# Patient Record
Sex: FEMALE | HISPANIC OR LATINO | Employment: UNEMPLOYED | URBAN - METROPOLITAN AREA
[De-identification: names, ages, dates, MRNs, and addresses within clinical notes are randomized per-mention and may not be internally consistent; named-entity substitution may affect disease eponyms.]

---

## 2017-02-23 ENCOUNTER — ALLSCRIPTS OFFICE VISIT (OUTPATIENT)
Dept: OTHER | Facility: OTHER | Age: 52
End: 2017-02-23

## 2017-02-23 DIAGNOSIS — Z13.29 ENCOUNTER FOR SCREENING FOR OTHER SUSPECTED ENDOCRINE DISORDER: ICD-10-CM

## 2017-02-23 DIAGNOSIS — R53.83 OTHER FATIGUE: ICD-10-CM

## 2017-02-23 DIAGNOSIS — R10.2 PELVIC AND PERINEAL PAIN: ICD-10-CM

## 2017-03-01 ENCOUNTER — GENERIC CONVERSION - ENCOUNTER (OUTPATIENT)
Dept: OTHER | Facility: OTHER | Age: 52
End: 2017-03-01

## 2017-03-29 ENCOUNTER — GENERIC CONVERSION - ENCOUNTER (OUTPATIENT)
Dept: OTHER | Facility: OTHER | Age: 52
End: 2017-03-29

## 2017-04-06 ENCOUNTER — GENERIC CONVERSION - ENCOUNTER (OUTPATIENT)
Dept: OTHER | Facility: OTHER | Age: 52
End: 2017-04-06

## 2017-10-03 ENCOUNTER — ALLSCRIPTS OFFICE VISIT (OUTPATIENT)
Dept: OTHER | Facility: OTHER | Age: 52
End: 2017-10-03

## 2017-10-03 ENCOUNTER — TRANSCRIBE ORDERS (OUTPATIENT)
Dept: LAB | Facility: CLINIC | Age: 52
End: 2017-10-03

## 2017-10-03 ENCOUNTER — APPOINTMENT (OUTPATIENT)
Dept: LAB | Facility: CLINIC | Age: 52
End: 2017-10-03
Payer: COMMERCIAL

## 2017-10-03 DIAGNOSIS — R10.9 ABDOMINAL PAIN: ICD-10-CM

## 2017-10-03 DIAGNOSIS — R10.11 RIGHT UPPER QUADRANT PAIN: ICD-10-CM

## 2017-10-03 LAB
BASOPHILS # BLD AUTO: 0.04 THOUSANDS/ΜL (ref 0–0.1)
BASOPHILS NFR BLD AUTO: 1 % (ref 0–1)
EOSINOPHIL # BLD AUTO: 0.08 THOUSAND/ΜL (ref 0–0.61)
EOSINOPHIL NFR BLD AUTO: 1 % (ref 0–6)
ERYTHROCYTE [DISTWIDTH] IN BLOOD BY AUTOMATED COUNT: 13 % (ref 11.6–15.1)
HCT VFR BLD AUTO: 38 % (ref 34.8–46.1)
HGB BLD-MCNC: 13.2 G/DL (ref 11.5–15.4)
LYMPHOCYTES # BLD AUTO: 1.91 THOUSANDS/ΜL (ref 0.6–4.47)
LYMPHOCYTES NFR BLD AUTO: 34 % (ref 14–44)
MCH RBC QN AUTO: 31.4 PG (ref 26.8–34.3)
MCHC RBC AUTO-ENTMCNC: 34.7 G/DL (ref 31.4–37.4)
MCV RBC AUTO: 90 FL (ref 82–98)
MONOCYTES # BLD AUTO: 0.46 THOUSAND/ΜL (ref 0.17–1.22)
MONOCYTES NFR BLD AUTO: 8 % (ref 4–12)
NEUTROPHILS # BLD AUTO: 3.13 THOUSANDS/ΜL (ref 1.85–7.62)
NEUTS SEG NFR BLD AUTO: 56 % (ref 43–75)
NRBC BLD AUTO-RTO: 0 /100 WBCS
PLATELET # BLD AUTO: 302 THOUSANDS/UL (ref 149–390)
PMV BLD AUTO: 10.5 FL (ref 8.9–12.7)
RBC # BLD AUTO: 4.21 MILLION/UL (ref 3.81–5.12)
WBC # BLD AUTO: 5.63 THOUSAND/UL (ref 4.31–10.16)

## 2017-10-03 PROCEDURE — 80053 COMPREHEN METABOLIC PANEL: CPT

## 2017-10-03 PROCEDURE — 83690 ASSAY OF LIPASE: CPT

## 2017-10-03 PROCEDURE — 81003 URINALYSIS AUTO W/O SCOPE: CPT

## 2017-10-03 PROCEDURE — 85025 COMPLETE CBC W/AUTO DIFF WBC: CPT

## 2017-10-03 PROCEDURE — 82150 ASSAY OF AMYLASE: CPT

## 2017-10-03 PROCEDURE — 36415 COLL VENOUS BLD VENIPUNCTURE: CPT

## 2017-10-04 ENCOUNTER — GENERIC CONVERSION - ENCOUNTER (OUTPATIENT)
Dept: OTHER | Facility: OTHER | Age: 52
End: 2017-10-04

## 2017-10-04 LAB
ALBUMIN SERPL BCP-MCNC: 3.5 G/DL (ref 3.5–5)
ALP SERPL-CCNC: 99 U/L (ref 46–116)
ALT SERPL W P-5'-P-CCNC: 32 U/L (ref 12–78)
AMYLASE SERPL-CCNC: 82 IU/L (ref 25–115)
ANION GAP SERPL CALCULATED.3IONS-SCNC: 8 MMOL/L (ref 4–13)
AST SERPL W P-5'-P-CCNC: 25 U/L (ref 5–45)
BILIRUB SERPL-MCNC: 0.31 MG/DL (ref 0.2–1)
BILIRUB UR QL STRIP: NEGATIVE
BUN SERPL-MCNC: 11 MG/DL (ref 5–25)
CALCIUM SERPL-MCNC: 8.7 MG/DL (ref 8.3–10.1)
CHLORIDE SERPL-SCNC: 105 MMOL/L (ref 100–108)
CLARITY UR: CLEAR
CO2 SERPL-SCNC: 24 MMOL/L (ref 21–32)
COLOR UR: YELLOW
CREAT SERPL-MCNC: 0.72 MG/DL (ref 0.6–1.3)
GFR SERPL CREATININE-BSD FRML MDRD: 97 ML/MIN/1.73SQ M
GLUCOSE SERPL-MCNC: 83 MG/DL (ref 65–140)
GLUCOSE UR STRIP-MCNC: NEGATIVE MG/DL
HGB UR QL STRIP.AUTO: NEGATIVE
KETONES UR STRIP-MCNC: NEGATIVE MG/DL
LEUKOCYTE ESTERASE UR QL STRIP: NEGATIVE
LIPASE SERPL-CCNC: 184 U/L (ref 73–393)
NITRITE UR QL STRIP: NEGATIVE
PH UR STRIP.AUTO: 6 [PH] (ref 4.5–8)
POTASSIUM SERPL-SCNC: 3.9 MMOL/L (ref 3.5–5.3)
PROT SERPL-MCNC: 7.8 G/DL (ref 6.4–8.2)
PROT UR STRIP-MCNC: NEGATIVE MG/DL
SODIUM SERPL-SCNC: 137 MMOL/L (ref 136–145)
SP GR UR STRIP.AUTO: 1.01 (ref 1–1.03)
UROBILINOGEN UR QL STRIP.AUTO: 1 E.U./DL

## 2017-10-06 ENCOUNTER — HOSPITAL ENCOUNTER (OUTPATIENT)
Dept: ULTRASOUND IMAGING | Facility: CLINIC | Age: 52
Discharge: HOME | End: 2017-10-06
Payer: COMMERCIAL

## 2017-10-06 DIAGNOSIS — R10.11 RIGHT UPPER QUADRANT PAIN: ICD-10-CM

## 2017-10-06 PROCEDURE — 76700 US EXAM ABDOM COMPLETE: CPT

## 2017-10-10 NOTE — PROGRESS NOTES
Assessment  1  Abdominal pain, RUQ (789 01) (R10 11)    Plan  Abdominal pain, Abdominal pain, RUQ    · (1) URINALYSIS w URINE C/S REFLEX (will reflex a microscopy if leukocytes, occult  blood, or nitrites are not within normal limits); Status:Active; Requested EMD:[id number]; Abdominal pain, RUQ    · (1) AMYLASE; Status:Active; Requested HCX:[id number];    · (1) CBC/PLT/DIFF; Status:Active; Requested MYP:[id number];    · (1) COMPREHENSIVE METABOLIC PANEL; Status:Active; Requested AOC:[id number];    · (1) LIPASE; Status:Active; Requested WCD:[id number];    · * US ABDOMEN COMPLETE; Status:Hold For - Scheduling; Requested [facility]:[id number];     Discussion/Summary    ABd pain-RUQ/flank-- BW and complete Abd US  Will f/u with pt  if anything is abnormal  Could be MSK, or related to lumber spinal stenosis  She may need to f/u with Dr Anders Chun  Chief Complaint  abd pain      History of Present Illness  HPI: Pt c/o abd pain for several weeks, about 3  Located in the right posterior/lateral rib cage and radiates around to the RUQ  It comes and goes for the last 3 weeks   but has been more constant for the last 3 days  Can be 3-5/10 or as bad as 7-8/10  It helps to massage it and sometimes it's worse with eating  No particular food makes it worse  Not worse with activity or movement or body position change  rash, no f/c/s  No change in BM, no blood in stool or melena  No N/V   UTI sx  She has a hx of spinal stenosis in the lumbar area and sees Dr Anders Chun  She was started on Gabapentin recently  She took celebrex in the past but not any more  has a history of cholecystectomy      Review of Systems    Constitutional: No fever, no chills, feels well, no tiredness, no recent weight gain or loss  ENT: no ear ache, no loss of hearing, no nosebleeds or nasal discharge, no sore throat or hoarseness     Cardiovascular: no complaints of slow or fast heart rate, no chest pain, no palpitations, no leg claudication or lower extremity edema  Respiratory: no complaints of shortness of breath, no wheezing, no dyspnea on exertion, no orthopnea or PND  Gastrointestinal: abdominal pain, but-as noted in HPI,-no nausea,-no vomiting,-no constipation,-no diarrhea-and-no blood in stools  Genitourinary: no complaints of dysuria, no incontinence, no pelvic pain, no dysmenorrhea, no vaginal discharge or abnormal vaginal bleeding  Musculoskeletal: as noted in HPI  ROS reviewed  Active Problems  1  Abdominal pain (789 00) (R10 9)   2  Abdominal pain, RUQ (789 01) (R10 11)   3  Abdominal pain, suprapubic (789 09) (R10 2)   4  Abnormal blood chemistry (790 6) (R79 9)   5  Abnormal CT scan, gastrointestinal tract (793 4) (R93 3)   6  Abnormal vaginal bleeding (623 8) (N93 9)   7  Acute maxillary sinusitis, recurrence not specified (461 0) (J01 00)   8  Allergic rhinitis due to pollen (477 0) (J30 1)   9  Change in bowel habits (787 99) (R19 4)   10  Chest wall tenderness (786 52) (R07 89)   11  Extrinsic asthma (493 00) (J45 909)   12  Fatigue (780 79) (R53 83)   13  Gastroesophageal reflux disease (530 81) (K21 9)   14  Irritable bowel syndrome (564 1) (K58 9)   15  Parotid mass (784 2) (K11 9)   16  Posterior cervical lymphadenopathy (785 6) (R59 0)   17  Rash, skin (782 1) (R21)   18  Right lower quadrant pain (789 03) (R10 31)   19  Screening for thyroid disorder (V77 0) (Z13 29)   20  Sialoadenitis (527 2) (K11 20)   21  Thumb pain, left (729 5) (M79 645)   22  Tick bites (919 4,E906 4) [facility])    Past Medical History  Active Problems And Past Medical History Reviewed: The active problems and past medical history were reviewed and updated today  Surgical History  Surgical History Reviewed: The surgical history was reviewed and updated today  Social History   · Housewife or homemaker   ·    · Never smoker   · No alcohol use   · No drug use  The social history was reviewed and updated today        Family History  Family History Reviewed: The family history was reviewed and updated today  Current Meds   1  Acidophilus Oral Tablet; Therapy: 36UIS8586 to Recorded   2  Gabapentin 600 MG Oral Tablet; Therapy: (Recorded:03Oct2017) to Recorded   3  Linzess 145 MCG Oral Capsule; 1 qd; Therapy: 17ENX3879 to (Last Rx:51Dca5877) Ordered   4  Nasonex 50 MCG/ACT Nasal Suspension (Mometasone Furoate); USE 2 SPRAYS IN   EACH NOSTRIL ONCE DAILY; Therapy: 47PTZ7334 to (Last [place])  Requested for: 15Rpq5433 Ordered   5  ProAir  (90 Base) MCG/ACT Inhalation Aerosol Solution; INHALE 2 PUFFS   EVERY 4 HOURS AS NEEDED; Therapy: 02ILO4572 to Recorded   6  Tylenol 325 MG Oral Tablet; TAKE 1 TABLET Every morning PRN; Therapy: 60OGL6201 to Recorded    The medication list was reviewed and updated today  Allergies  1  No Known Drug Allergies    Vitals   Recorded: 36LWQ6692 04:12PM   Temperature 98 2 F, Oral   Heart Rate 70   Systolic 568   Diastolic 80   Height 5 ft    Weight 159 lb    BMI Calculated 31 05   BSA Calculated 1 69   O2 Saturation 98     Physical Exam    Constitutional   General appearance: No acute distress, well appearing and well nourished  Eyes   Conjunctiva and lids: No swelling, erythema or discharge  Pupils and irises: Equal, round and reactive to light  Ears, Nose, Mouth, and Throat   External inspection of ears and nose: Normal     Otoscopic examination: Tympanic membranes translucent with normal light reflex  Canals patent without erythema  Nasal mucosa, septum, and turbinates: Normal without edema or erythema  Oropharynx: Normal with no erythema, edema, exudate or lesions  Pulmonary   Respiratory effort: No increased work of breathing or signs of respiratory distress  Auscultation of lungs: Clear to auscultation  Cardiovascular   Auscultation of heart: Normal rate and rhythm, normal S1 and S2, without murmurs      Examination of extremities for edema and/or varicosities: Normal     Abdomen   Abdomen: Abnormal   The abdomen was rounded  Bowel sounds were normal  There was mild tenderness in the right upper quadrant  The abdomen was not firm  No rebound tenderness  No guarding  no CVA tenderness   Lymphatic   Palpation of lymph nodes in neck: No lymphadenopathy  Musculoskeletal   Inspection/palpation of joints, bones, and muscles: Abnormal  -slightly tender to palp in the right posterolateral rib cage area     Psychiatric   Mood and affect: Normal          Signatures   Electronically signed by : AILYN Mora; Oct  3 2017  5:11PM EST                       (Author)    Electronically signed by : CHELSEY Krueger ; Oct  9 2017 10:33AM EST

## 2018-01-13 VITALS
HEIGHT: 60 IN | SYSTOLIC BLOOD PRESSURE: 118 MMHG | TEMPERATURE: 98.2 F | OXYGEN SATURATION: 98 % | DIASTOLIC BLOOD PRESSURE: 80 MMHG | HEART RATE: 70 BPM | BODY MASS INDEX: 31.22 KG/M2 | WEIGHT: 159 LBS

## 2018-01-15 NOTE — RESULT NOTES
Verified Results  (1) COMPREHENSIVE METABOLIC PANEL [id number] 12:59CO [place] Julia Christianson   TW Order Number: [id number]     Test Name Result Flag Reference   GLUCOSE,RANDM 83 mg/dL     If the patient is fasting, the ADA then defines impaired fasting glucose as > 100 mg/dL and diabetes as > or equal to 123 mg/dL  Specimen collection should occur prior to Sulfasalazine administration due to the potential for falsely depressed results  Specimen collection should occur prior to Sulfapyridine administration due to the potential for falsely elevated results  SODIUM 137 mmol/L  136-145   POTASSIUM 3 9 mmol/L  3 5-5 3   CHLORIDE 105 mmol/L  100-108   CARBON DIOXIDE 24 mmol/L  21-32   ANION GAP (CALC) 8 mmol/L  4-13   BLOOD UREA NITROGEN 11 mg/dL  5-25   CREATININE 0 72 mg/dL  0 60-1 30   Standardized to IDMS reference method   CALCIUM 8 7 mg/dL  8 3-10 1   BILI, TOTAL 0 31 mg/dL  0 20-1 00   ALK PHOSPHATAS 99 U/L     ALT (SGPT) 32 U/L  12-78   Specimen collection should occur prior to Sulfasalazine and/or Sulfapyridine administration due to the potential for falsely depressed results  AST(SGOT) 25 U/L  5-45   Specimen collection should occur prior to Sulfasalazine administration due to the potential for falsely depressed results  ALBUMIN 3 5 g/dL  3 5-5 0   TOTAL PROTEIN 7 8 g/dL  6 4-8 2   eGFR 97 ml/min/1 73sq m     National Kidney Disease Education Program recommendations are as follows:  GFR calculation is accurate only with a steady state creatinine  Chronic Kidney disease less than 60 ml/min/1 73 sq  meters  Kidney failure less than 15 ml/min/1 73 sq  meters  (1) CBC/PLT/DIFF 62WOV0639 04:50PM Leona Neri   TW Order Number: [id number]     Test Name Result Flag Reference   WBC COUNT 5 63 Thousand/uL  4 31-10 16   RBC COUNT 4 21 Million/uL  3 81-5 12   HEMOGLOBIN 13 2 g/dL  11 5-15 4   HEMATOCRIT 38 0 %  34 8-46  1   MCV 90 fL  82-98   MCH 31 4 pg  26 8-34 3   MCHC 34 7 g/dL  31 4-37 4   RDW 13 0 %  11 6-15 1   MPV 10 5 fL  8 9-12 7   PLATELET COUNT 707 Thousands/uL  149-390   nRBC AUTOMATED 0 /100 WBCs     NEUTROPHILS RELATIVE PERCENT 56 %  43-75   LYMPHOCYTES RELATIVE PERCENT 34 %  14-44   MONOCYTES RELATIVE PERCENT 8 %  4-12   EOSINOPHILS RELATIVE PERCENT 1 %  0-6   BASOPHILS RELATIVE PERCENT 1 %  0-1   NEUTROPHILS ABSOLUTE COUNT 3 13 Thousands/? ??L  1 85-7 62   LYMPHOCYTES ABSOLUTE COUNT 1 91 Thousands/? ??L  0 60-4 47   MONOCYTES ABSOLUTE COUNT 0 46 Thousand/? ??L  0 17-1 22   EOSINOPHILS ABSOLUTE COUNT 0 08 Thousand/? ??L  0 00-0 61   BASOPHILS ABSOLUTE COUNT 0 04 Thousands/? ??L  0 00-0 10     (1) LIPASE 89YBP5044 04:50PM Mariam Hotelements    Order Number: [id number]     Test Name Result Flag Reference   LIPASE 184 u/L       (1) AMYLASE 57WRC7730 04:50PM M-DAQ    Order Number: [id number]     Test Name Result Flag Reference   AMYLASE 82 IU/L       (1) URINALYSIS w URINE C/S REFLEX (will reflex a microscopy if leukocytes, occult blood, or nitrites are not within normal limits) 03Oct2017 04:50PM M-DAQ    Order Number: [id number]     Test Name Result Flag Reference   COLOR Yellow     CLARITY Clear     PH UA 6 0  4 5-8 0   LEUKOCYTE ESTERASE UA Negative  Negative   NITRITE UA Negative  Negative   PROTEIN UA Negative mg/dl  Negative   GLUCOSE UA Negative mg/dl  Negative   KETONES UA Negative mg/dl  Negative   UROBILINOGEN UA 1 0 E U /dl  0 2, 1 0 E U /dl   BILIRUBIN UA Negative  Negative   BLOOD UA Negative  Negative   SPECIFIC GRAVITY UA 1 007  1 003-1 030     Health Maintenance Flow Sheet 86IRA5814 03:18PM      Test Name Result Flag Reference   Pap      faxed to [facility] obgyn dr Ysabel Alicea   [facility]  Health Maintenance    · COLONOSCOPY ; every 10 years; Last [id number];  Next [id number]; Status:Active

## 2018-01-22 VITALS
DIASTOLIC BLOOD PRESSURE: 86 MMHG | HEIGHT: 60 IN | OXYGEN SATURATION: 97 % | HEART RATE: 88 BPM | BODY MASS INDEX: 31.12 KG/M2 | TEMPERATURE: 98.1 F | WEIGHT: 158.5 LBS | SYSTOLIC BLOOD PRESSURE: 122 MMHG

## 2018-01-24 ENCOUNTER — OFFICE VISIT (OUTPATIENT)
Dept: INTERNAL MEDICINE CLINIC | Facility: CLINIC | Age: 53
End: 2018-01-24
Payer: COMMERCIAL

## 2018-01-24 VITALS
DIASTOLIC BLOOD PRESSURE: 88 MMHG | SYSTOLIC BLOOD PRESSURE: 126 MMHG | WEIGHT: 159.6 LBS | HEART RATE: 81 BPM | OXYGEN SATURATION: 99 % | BODY MASS INDEX: 30.13 KG/M2 | HEIGHT: 61 IN | TEMPERATURE: 99.2 F

## 2018-01-24 DIAGNOSIS — J02.9 ACUTE PHARYNGITIS, UNSPECIFIED ETIOLOGY: Primary | ICD-10-CM

## 2018-01-24 DIAGNOSIS — Z12.11 SCREENING FOR COLON CANCER: ICD-10-CM

## 2018-01-24 LAB — S PYO AG THROAT QL: NEGATIVE

## 2018-01-24 PROCEDURE — 87880 STREP A ASSAY W/OPTIC: CPT | Performed by: PHYSICIAN ASSISTANT

## 2018-01-24 PROCEDURE — 99213 OFFICE O/P EST LOW 20 MIN: CPT | Performed by: PHYSICIAN ASSISTANT

## 2018-01-24 RX ORDER — MOMETASONE FUROATE 50 UG/1
2 SPRAY, METERED NASAL DAILY
COMMUNITY
Start: 2014-05-13 | End: 2018-03-26

## 2018-01-24 RX ORDER — YOHIMBE BARK 500 MG
CAPSULE ORAL
COMMUNITY
Start: 2014-05-13 | End: 2019-03-20 | Stop reason: CLARIF

## 2018-01-24 RX ORDER — ACETAMINOPHEN 325 MG/1
TABLET ORAL
COMMUNITY
Start: 2014-05-13 | End: 2019-03-20 | Stop reason: CLARIF

## 2018-01-24 RX ORDER — ALBUTEROL SULFATE 90 UG/1
2 AEROSOL, METERED RESPIRATORY (INHALATION) EVERY 4 HOURS PRN
COMMUNITY
Start: 2014-05-13 | End: 2019-03-20 | Stop reason: CLARIF

## 2018-01-24 RX ORDER — GABAPENTIN 600 MG/1
600 TABLET ORAL
COMMUNITY
Start: 2017-12-29 | End: 2018-05-01 | Stop reason: SDUPTHER

## 2018-01-24 NOTE — PROGRESS NOTES
Assessment/Plan:  Strep screen negative most likely viral illness recommend symptomatic treatment with Tylenol Sudafed return if she worsens    No problem-specific Assessment & Plan notes found for this encounter  Diagnoses and all orders for this visit:    Acute pharyngitis, unspecified etiology    Other orders  -     gabapentin (NEURONTIN) 600 MG tablet;   -     albuterol (PROAIR HFA) 90 mcg/act inhaler; Inhale 2 puffs every 4 (four) hours as needed  -     mometasone (NASONEX) 50 mcg/act nasal spray; 2 sprays into each nostril daily  -     Linaclotide (LINZESS) 145 MCG CAPS; Take by mouth daily  -     acetaminophen (TYLENOL) 325 mg tablet; Take by mouth  -     Lactobacillus (ACIDOPHILUS) 100 MG CAPS; Take by mouth          Subjective:      Patient ID: Jenny Jean is a 46 y o  female  She developed feverishness generally achy a sore throat carotidynia on the left 4 days ago she is beginning to have a nonproductive cough and some pain in her left ear no shortness of breath or chest pain  She has a little nauseated no vomiting or diarrhea no severe muscular aching  Her health has previously good        The following portions of the patient's history were reviewed and updated as appropriate: allergies, current medications, past family history, past medical history, past social history, past surgical history and problem list     Review of Systems   Constitutional: Negative for activity change, appetite change, chills, diaphoresis, fatigue, fever and unexpected weight change  HENT: Positive for ear pain, mouth sores and sore throat  Negative for congestion, dental problem, drooling, ear discharge, facial swelling, hearing loss, nosebleeds, postnasal drip, rhinorrhea, sinus pain, sinus pressure, sneezing, tinnitus, trouble swallowing and voice change  Eyes: Negative for photophobia, pain, discharge, redness, itching and visual disturbance  Respiratory: Positive for cough   Negative for apnea, choking, chest tightness, shortness of breath, wheezing and stridor  Cardiovascular: Negative for chest pain, palpitations and leg swelling  Gastrointestinal: Negative for abdominal distention, abdominal pain, anal bleeding, blood in stool, constipation, diarrhea, nausea, rectal pain and vomiting  Endocrine: Negative for cold intolerance, heat intolerance, polydipsia, polyphagia and polyuria  Genitourinary: Negative for decreased urine volume, difficulty urinating, dysuria, enuresis, flank pain, frequency, genital sores, hematuria and urgency  Musculoskeletal: Negative for arthralgias, back pain, gait problem, joint swelling, myalgias, neck pain and neck stiffness  Skin: Negative for color change, pallor, rash and wound  Neurological: Negative for dizziness, tremors, seizures, syncope, facial asymmetry, speech difficulty, weakness, light-headedness, numbness and headaches  Hematological: Negative for adenopathy  Does not bruise/bleed easily  Psychiatric/Behavioral: Negative for agitation, behavioral problems, confusion, decreased concentration, dysphoric mood, hallucinations, self-injury, sleep disturbance and suicidal ideas  The patient is not nervous/anxious and is not hyperactive  Objective:     Physical Exam   Constitutional: She is oriented to person, place, and time  She appears well-developed  HENT:   Head: Normocephalic  Right Ear: External ear normal    Left Ear: External ear normal    Mouth/Throat: Oropharynx is clear and moist    Eyes: Conjunctivae are normal  Pupils are equal, round, and reactive to light  Neck: Neck supple  Cardiovascular: Normal rate, regular rhythm, normal heart sounds and intact distal pulses  Pulmonary/Chest: Effort normal and breath sounds normal    Abdominal: Soft  Bowel sounds are normal    Musculoskeletal: Normal range of motion  Neurological: She is alert and oriented to person, place, and time  She has normal reflexes     Skin: Skin is warm and dry

## 2018-03-26 ENCOUNTER — OFFICE VISIT (OUTPATIENT)
Dept: INTERNAL MEDICINE CLINIC | Facility: CLINIC | Age: 53
End: 2018-03-26
Payer: COMMERCIAL

## 2018-03-26 VITALS
DIASTOLIC BLOOD PRESSURE: 80 MMHG | BODY MASS INDEX: 29.45 KG/M2 | WEIGHT: 156 LBS | TEMPERATURE: 98.3 F | HEIGHT: 61 IN | HEART RATE: 75 BPM | OXYGEN SATURATION: 98 % | SYSTOLIC BLOOD PRESSURE: 130 MMHG

## 2018-03-26 DIAGNOSIS — Z13.228 SCREENING FOR METABOLIC DISORDER: ICD-10-CM

## 2018-03-26 DIAGNOSIS — Z13.29 SCREENING FOR THYROID DISORDER: ICD-10-CM

## 2018-03-26 DIAGNOSIS — M48.07 SPINAL STENOSIS OF LUMBOSACRAL REGION: ICD-10-CM

## 2018-03-26 DIAGNOSIS — R09.82 POST-NASAL DRIP: ICD-10-CM

## 2018-03-26 DIAGNOSIS — M79.89 BILATERAL HAND SWELLING: ICD-10-CM

## 2018-03-26 DIAGNOSIS — Z13.6 SCREENING FOR HEART DISEASE: ICD-10-CM

## 2018-03-26 DIAGNOSIS — Z13.0 SCREENING FOR DEFICIENCY ANEMIA: ICD-10-CM

## 2018-03-26 DIAGNOSIS — J30.9 ALLERGIC RHINITIS, UNSPECIFIED CHRONICITY, UNSPECIFIED SEASONALITY, UNSPECIFIED TRIGGER: Primary | ICD-10-CM

## 2018-03-26 PROCEDURE — 99214 OFFICE O/P EST MOD 30 MIN: CPT | Performed by: PHYSICIAN ASSISTANT

## 2018-03-26 RX ORDER — CELECOXIB 100 MG/1
100 CAPSULE ORAL DAILY
Refills: 0
Start: 2018-03-26 | End: 2019-03-20 | Stop reason: CLARIF

## 2018-03-26 RX ORDER — FLUTICASONE PROPIONATE 50 MCG
2 SPRAY, SUSPENSION (ML) NASAL DAILY
Qty: 3 BOTTLE | Refills: 3 | Status: SHIPPED | OUTPATIENT
Start: 2018-03-26 | End: 2018-10-24 | Stop reason: CLARIF

## 2018-03-26 NOTE — PROGRESS NOTES
Assessment/Plan:     symptoms consistent with allergic rhinitis versus viral syndrome -the patient has been taking Nasonex chronically  We suggested she switch to [facility] and add an over-the-counter nonsedating antihistamine  She says her  uses Cresencio Skyler and she has at home  She will try these medications for at least 10-14 days to determine efficacy  Chest wall pain -this is likely musculoskeletal   The patient takes Celebrex daily for spinal stenosis prescribed by her pain management doctor  She may to add Tylenol 3 to 4 times a day for the next 5-7 days and warm compresses to the area  Hand swelling and stiffness-- check MAY, rheumatoid factor and Lyme titer  Patient has not had routine screening blood work in over a year  We will give her orders and she will follow up with Dr hernandez        No problem-specific Mariah Lipscomb notes found for this encounter  Diagnoses and all orders for this visit:    Allergic rhinitis, unspecified chronicity, unspecified seasonality, unspecified trigger    Spinal stenosis of lumbosacral region  -     celecoxib (CeleBREX) 100 mg capsule; Take 1 capsule (100 mg total) by mouth daily    Post-nasal drip  -     fluticasone (FLONASE) 50 mcg/act nasal spray; 2 sprays into each nostril daily    Bilateral hand swelling  -     MAY Screen w/ Reflex to Titer/Pattern; Future  -     RF Screen w/ Reflex to Titer; Future  -     Lyme Antibody Profile with reflex to WB; Future    Screening for deficiency anemia  -     CBC and differential; Future    Screening for metabolic disorder  -     Comprehensive metabolic panel; Future    Screening for heart disease  -     Lipid panel; Future    Screening for thyroid disorder  -     TSH, 3rd generation with T4 reflex; Future    Other orders  -     Hm Colonoscopy  -     Mammo screening bilateral w cad  -     Mammo screening bilateral w cad  -     Hm Pap Smear          Subjective:      Patient ID: Rey Purvis is a 46 y o  female  Patient comes in with symptoms of pain that started around her left ear and extended from under her ear down her neck  She also has had a clear runny nose, a lot of sneezing and watery eyes  She also notices a lot of throat clearing but no cough, fever, chills or sweats  No shortness of breath  She does complain of some pain over her left chest area that hurts more when she presses on it and takes a deep breath  She felt this more last week and it seems to be getting a little bit better  She denies any activity or trauma that may have caused this pain  Patient also reports that every day when she wakes up her hands feel swollen and stiff  As the day goes on it seems to get better but it is a daily thing  This is been going on for a few months  Earache    There is pain in the left ear  This is a new problem  The current episode started in the past 7 days  The problem occurs every few minutes  The problem has been waxing and waning  There has been no fever  Associated symptoms include neck pain and rhinorrhea  Pertinent negatives include no abdominal pain, coughing, diarrhea, ear discharge or sore throat  The following portions of the patient's history were reviewed and updated as appropriate: allergies, current medications, past family history, past medical history, past social history, past surgical history and problem list     Review of Systems   Constitutional: Negative for chills, fatigue and fever  HENT: Positive for congestion, ear pain, postnasal drip, rhinorrhea and sneezing  Negative for ear discharge, sinus pain, sinus pressure and sore throat  Eyes:        Watery eyes   Respiratory: Negative for cough, chest tightness and shortness of breath  Cardiovascular: Negative for chest pain and palpitations  Gastrointestinal: Negative for abdominal pain, diarrhea and nausea  Musculoskeletal: Positive for neck pain          Chest wall tenderness over the left lateral ribcage and just lateral to the sternum on the left         Objective:      /80   Pulse 75   Temp 98 3 °F (36 8 °C) (Tympanic)   Ht 5' 1" (1 549 m)   Wt 70 8 kg (156 lb)   SpO2 98%   BMI 29 48 kg/m²          Physical Exam   Constitutional: She appears well-developed and well-nourished  HENT:   Head: Normocephalic and atraumatic  Right Ear: External ear normal    Left Ear: External ear normal    Mouth/Throat: Oropharynx is clear and moist  No oropharyngeal exudate  Eyes: Conjunctivae are normal  Pupils are equal, round, and reactive to light  Right eye exhibits no discharge  Left eye exhibits no discharge  Neck: Normal range of motion  Neck supple  Cardiovascular: Normal rate, regular rhythm, normal heart sounds and intact distal pulses  Pulmonary/Chest: Effort normal and breath sounds normal  No respiratory distress  She has no wheezes  She has no rales  Abdominal: Soft  Bowel sounds are normal  There is no tenderness  Musculoskeletal: She exhibits tenderness  Tenderness to palpation over the chest wall just lateral to the left breast and lateral to the sternum on the left   Lymphadenopathy:     She has no cervical adenopathy  Skin: No rash noted

## 2018-03-28 ENCOUNTER — TELEPHONE (OUTPATIENT)
Dept: INTERNAL MEDICINE CLINIC | Facility: CLINIC | Age: 53
End: 2018-03-28

## 2018-03-28 NOTE — TELEPHONE ENCOUNTER
Patient was in on Monday and saw Jenelle Braden  Would like to know if Roxanne can call in an antibiotic  She has developed phlegm and a hacking cough and throat is hurting  Pls call her at [phone]   If she needs to come in, pls make appt for after 4PM

## 2018-04-02 ENCOUNTER — OFFICE VISIT (OUTPATIENT)
Dept: INTERNAL MEDICINE CLINIC | Facility: CLINIC | Age: 53
End: 2018-04-02
Payer: COMMERCIAL

## 2018-04-02 ENCOUNTER — TELEPHONE (OUTPATIENT)
Dept: INTERNAL MEDICINE CLINIC | Facility: CLINIC | Age: 53
End: 2018-04-02

## 2018-04-02 VITALS
SYSTOLIC BLOOD PRESSURE: 138 MMHG | BODY MASS INDEX: 29.19 KG/M2 | DIASTOLIC BLOOD PRESSURE: 90 MMHG | HEART RATE: 71 BPM | HEIGHT: 61 IN | OXYGEN SATURATION: 97 % | TEMPERATURE: 98.8 F | RESPIRATION RATE: 18 BRPM | WEIGHT: 154.6 LBS

## 2018-04-02 DIAGNOSIS — J45.21 EXACERBATION OF INTERMITTENT ASTHMA, UNSPECIFIED ASTHMA SEVERITY: Primary | ICD-10-CM

## 2018-04-02 DIAGNOSIS — J40 BRONCHITIS: ICD-10-CM

## 2018-04-02 PROCEDURE — 3008F BODY MASS INDEX DOCD: CPT | Performed by: PHYSICIAN ASSISTANT

## 2018-04-02 PROCEDURE — 99214 OFFICE O/P EST MOD 30 MIN: CPT | Performed by: PHYSICIAN ASSISTANT

## 2018-04-02 RX ORDER — PREDNISONE 10 MG/1
TABLET ORAL
Qty: 30 TABLET | Refills: 0 | Status: SHIPPED | OUTPATIENT
Start: 2018-04-02 | End: 2018-05-01 | Stop reason: ALTCHOICE

## 2018-04-02 RX ORDER — LEVOFLOXACIN 750 MG/1
750 TABLET ORAL EVERY 24 HOURS
Qty: 10 TABLET | Refills: 0 | Status: SHIPPED | OUTPATIENT
Start: 2018-04-02 | End: 2018-04-12

## 2018-04-02 NOTE — TELEPHONE ENCOUNTER
ADILENE BRAGG LAST  Monday    SAID  SHE  IS  STILL NOT  FEELING  BETTER  HAS  A LOT  OF  PHLEGM WANTS  TO  KNOW  IF  SHE  CAN GET  AN  ANTIBODIC  DOWN   PT  WAS  IN  BED  ALL WEEKEND   [facility]  [place]   CALL PT  [phone]

## 2018-04-02 NOTE — PROGRESS NOTES
Assessment/Plan:  A lot of coughing rhonchi and wheezing antibiotics steroids cough medicine anti-inflammatories return if she worsens  Diagnoses and all orders for this visit:    Exacerbation of intermittent asthma, unspecified asthma severity  -     predniSONE 10 mg tablet; Take 40 milligrams for 2 days, 30 milligrams for 2 days, 20 milligrams for 2 days, 10 milligrams for 2 days, then stop  -     levofloxacin (LEVAQUIN) 750 mg tablet; Take 1 tablet (750 mg total) by mouth every 24 hours for 10 days    Bronchitis  -     predniSONE 10 mg tablet; Take 40 milligrams for 2 days, 30 milligrams for 2 days, 20 milligrams for 2 days, 10 milligrams for 2 days, then stop  -     levofloxacin (LEVAQUIN) 750 mg tablet; Take 1 tablet (750 mg total) by mouth every 24 hours for 10 days        Subjective:      Patient ID: Sheila Olson is a 46 y o  female  Coughing wheezing chest pain fever fatigue poor appetite she has had all of these symptoms for 10 days now she is coughing up light green phlegm no hemoptysis  She has generalized chest pain when she takes a deep breath she has severe paroxysms of coughing throughout the day  A previous history of intermittent asthma and allergies  Seen here last week treated symptomatically but symptoms have become worse no orthopnea or PND slightly short of breath when she climbs stairs history of chronic back pain follows with pain management        The following portions of the patient's history were reviewed and updated as appropriate:   She has a past medical history of Abdominal pain; Arthralgia of pelvic region and thigh; Chest pain; CTS (carpal tunnel syndrome); Fibromyalgia; Hepatitis; Leukocytopenia; Leukorrhea; and Temporomandibular joint sounds on opening and/or closing the jaw ,   does not have any pertinent problems on file  ,   has a past surgical history that includes Cholecystectomy; Foot surgery (Right); Sinus surgery; and Incontinence surgery  ,  family history includes Alzheimer's disease in her father and maternal grandmother; Asthma in her father; Emphysema in her father and paternal grandmother; Heart attack in her father; Lymphoma in her maternal aunt  ,   reports that she has never smoked  She has never used smokeless tobacco  She reports that she does not drink alcohol or use drugs  ,  is allergic to other     Current Outpatient Prescriptions   Medication Sig Dispense Refill    acetaminophen (TYLENOL) 325 mg tablet Take by mouth      albuterol (PROAIR HFA) 90 mcg/act inhaler Inhale 2 puffs every 4 (four) hours as needed      celecoxib (CeleBREX) 100 mg capsule Take 1 capsule (100 mg total) by mouth daily  0    fluticasone (FLONASE) 50 mcg/act nasal spray 2 sprays into each nostril daily 3 Bottle 3    gabapentin (NEURONTIN) 600 MG tablet Take 600 mg by mouth daily at bedtime       Lactobacillus (ACIDOPHILUS) 100 MG CAPS Take by mouth      Linaclotide (LINZESS) 145 MCG CAPS Take by mouth daily      levofloxacin (LEVAQUIN) 750 mg tablet Take 1 tablet (750 mg total) by mouth every 24 hours for 10 days 10 tablet 0    predniSONE 10 mg tablet Take 40 milligrams for 2 days, 30 milligrams for 2 days, 20 milligrams for 2 days, 10 milligrams for 2 days, then stop 30 tablet 0     No current facility-administered medications for this visit  Review of Systems   Constitutional: Positive for appetite change, fatigue and fever  Negative for activity change, chills, diaphoresis and unexpected weight change  HENT: Negative for congestion, dental problem, drooling, ear discharge, ear pain, facial swelling, hearing loss, nosebleeds, postnasal drip, rhinorrhea, sinus pain, sinus pressure, sneezing, sore throat, tinnitus, trouble swallowing and voice change  Eyes: Negative for photophobia, pain, discharge, redness, itching and visual disturbance  Respiratory: Positive for cough, shortness of breath and wheezing  Negative for apnea, choking, chest tightness and stridor  Cardiovascular: Negative for chest pain, palpitations and leg swelling  Gastrointestinal: Negative for abdominal distention, abdominal pain, anal bleeding, blood in stool, constipation, diarrhea, nausea, rectal pain and vomiting  Endocrine: Negative for cold intolerance, heat intolerance, polydipsia, polyphagia and polyuria  Genitourinary: Negative for decreased urine volume, difficulty urinating, dysuria, enuresis, flank pain, frequency, genital sores, hematuria and urgency  Musculoskeletal: Negative for arthralgias, back pain, gait problem, joint swelling, myalgias, neck pain and neck stiffness  Skin: Negative for color change, pallor, rash and wound  Neurological: Negative for dizziness, tremors, seizures, syncope, facial asymmetry, speech difficulty, weakness, light-headedness, numbness and headaches  Hematological: Negative for adenopathy  Does not bruise/bleed easily  Psychiatric/Behavioral: Negative for agitation, behavioral problems, confusion, decreased concentration, dysphoric mood, hallucinations, self-injury, sleep disturbance and suicidal ideas  The patient is not nervous/anxious and is not hyperactive  Objective:  Vitals:    04/02/18 1747 04/02/18 1826   BP: 140/96 138/90   Pulse: 71    Resp: 18    Temp: 98 8 °F (37 1 °C)    SpO2: 97%    Weight: 70 1 kg (154 lb 9 6 oz)    Height: 5' 1" (1 549 m)      Body mass index is 29 21 kg/m²  Physical Exam   Constitutional: She is oriented to person, place, and time  She appears well-developed  HENT:   Head: Normocephalic  Right Ear: External ear normal    Left Ear: External ear normal    Mouth/Throat: No oropharyngeal exudate (Pharynx injected no adenopathy)  Eyes: Conjunctivae are normal  Pupils are equal, round, and reactive to light  Neck: Neck supple  No thyromegaly present  Cardiovascular: Normal rate, regular rhythm, normal heart sounds and intact distal pulses  Exam reveals no gallop and no friction rub      No murmur heard  Pulmonary/Chest: Effort normal  Respiratory distress: A lotOf coughing  She has wheezes (And rhonchi)  She has no rales  She exhibits no tenderness  Abdominal: Soft  Bowel sounds are normal    Musculoskeletal: Normal range of motion  Neurological: She is alert and oriented to person, place, and time  She has normal reflexes  Skin: Skin is warm and dry

## 2018-04-25 ENCOUNTER — APPOINTMENT (OUTPATIENT)
Dept: LAB | Facility: HOSPITAL | Age: 53
End: 2018-04-25
Payer: COMMERCIAL

## 2018-04-25 DIAGNOSIS — M79.89 BILATERAL HAND SWELLING: ICD-10-CM

## 2018-04-25 DIAGNOSIS — Z13.6 SCREENING FOR HEART DISEASE: ICD-10-CM

## 2018-04-25 DIAGNOSIS — Z13.29 SCREENING FOR THYROID DISORDER: ICD-10-CM

## 2018-04-25 DIAGNOSIS — Z13.0 SCREENING FOR DEFICIENCY ANEMIA: ICD-10-CM

## 2018-04-25 DIAGNOSIS — Z13.228 SCREENING FOR METABOLIC DISORDER: ICD-10-CM

## 2018-04-25 LAB
ALBUMIN SERPL BCP-MCNC: 3.5 G/DL (ref 3.5–5)
ALP SERPL-CCNC: 80 U/L (ref 46–116)
ALT SERPL W P-5'-P-CCNC: 55 U/L (ref 12–78)
ANION GAP SERPL CALCULATED.3IONS-SCNC: 8 MMOL/L (ref 4–13)
AST SERPL W P-5'-P-CCNC: 36 U/L (ref 5–45)
BASOPHILS # BLD AUTO: 0.02 THOUSANDS/ΜL (ref 0–0.1)
BASOPHILS NFR BLD AUTO: 1 % (ref 0–1)
BILIRUB SERPL-MCNC: 0.4 MG/DL (ref 0.2–1)
BUN SERPL-MCNC: 12 MG/DL (ref 5–25)
CALCIUM SERPL-MCNC: 9.2 MG/DL (ref 8.3–10.1)
CHLORIDE SERPL-SCNC: 104 MMOL/L (ref 100–108)
CHOLEST SERPL-MCNC: 238 MG/DL (ref 50–200)
CO2 SERPL-SCNC: 28 MMOL/L (ref 21–32)
CREAT SERPL-MCNC: 0.74 MG/DL (ref 0.6–1.3)
EOSINOPHIL # BLD AUTO: 0.06 THOUSAND/ΜL (ref 0–0.61)
EOSINOPHIL NFR BLD AUTO: 2 % (ref 0–6)
ERYTHROCYTE [DISTWIDTH] IN BLOOD BY AUTOMATED COUNT: 12.3 % (ref 11.6–15.1)
GFR SERPL CREATININE-BSD FRML MDRD: 93 ML/MIN/1.73SQ M
GLUCOSE P FAST SERPL-MCNC: 102 MG/DL (ref 65–99)
HCT VFR BLD AUTO: 38.5 % (ref 34.8–46.1)
HDLC SERPL-MCNC: 62 MG/DL (ref 40–60)
HGB BLD-MCNC: 12.8 G/DL (ref 11.5–15.4)
LDLC SERPL CALC-MCNC: 156 MG/DL (ref 0–100)
LYMPHOCYTES # BLD AUTO: 1.43 THOUSANDS/ΜL (ref 0.6–4.47)
LYMPHOCYTES NFR BLD AUTO: 36 % (ref 14–44)
MCH RBC QN AUTO: 30.3 PG (ref 26.8–34.3)
MCHC RBC AUTO-ENTMCNC: 33.2 G/DL (ref 31.4–37.4)
MCV RBC AUTO: 91 FL (ref 82–98)
MONOCYTES # BLD AUTO: 0.25 THOUSAND/ΜL (ref 0.17–1.22)
MONOCYTES NFR BLD AUTO: 6 % (ref 4–12)
NEUTROPHILS # BLD AUTO: 2.18 THOUSANDS/ΜL (ref 1.85–7.62)
NEUTS SEG NFR BLD AUTO: 55 % (ref 43–75)
NONHDLC SERPL-MCNC: 176 MG/DL
NRBC BLD AUTO-RTO: 0 /100 WBCS
PLATELET # BLD AUTO: 290 THOUSANDS/UL (ref 149–390)
PMV BLD AUTO: 9.8 FL (ref 8.9–12.7)
POTASSIUM SERPL-SCNC: 3.8 MMOL/L (ref 3.5–5.3)
PROT SERPL-MCNC: 7.6 G/DL (ref 6.4–8.2)
RBC # BLD AUTO: 4.23 MILLION/UL (ref 3.81–5.12)
SODIUM SERPL-SCNC: 140 MMOL/L (ref 136–145)
TRIGL SERPL-MCNC: 101 MG/DL
TSH SERPL DL<=0.05 MIU/L-ACNC: 2.58 UIU/ML (ref 0.36–3.74)
WBC # BLD AUTO: 3.95 THOUSAND/UL (ref 4.31–10.16)

## 2018-04-25 PROCEDURE — 86430 RHEUMATOID FACTOR TEST QUAL: CPT

## 2018-04-25 PROCEDURE — 36415 COLL VENOUS BLD VENIPUNCTURE: CPT

## 2018-04-25 PROCEDURE — 80053 COMPREHEN METABOLIC PANEL: CPT

## 2018-04-25 PROCEDURE — 80061 LIPID PANEL: CPT

## 2018-04-25 PROCEDURE — 84443 ASSAY THYROID STIM HORMONE: CPT

## 2018-04-25 PROCEDURE — 86038 ANTINUCLEAR ANTIBODIES: CPT

## 2018-04-25 PROCEDURE — 85025 COMPLETE CBC W/AUTO DIFF WBC: CPT

## 2018-04-25 PROCEDURE — 86618 LYME DISEASE ANTIBODY: CPT

## 2018-04-26 LAB
B BURGDOR IGG SER IA-ACNC: 0.15
B BURGDOR IGM SER IA-ACNC: 0.23
RHEUMATOID FACT SER QL LA: NEGATIVE

## 2018-04-27 LAB — RYE IGE QN: NEGATIVE

## 2018-05-01 ENCOUNTER — OFFICE VISIT (OUTPATIENT)
Dept: INTERNAL MEDICINE CLINIC | Facility: CLINIC | Age: 53
End: 2018-05-01
Payer: COMMERCIAL

## 2018-05-01 VITALS
BODY MASS INDEX: 29.91 KG/M2 | HEART RATE: 85 BPM | HEIGHT: 61 IN | DIASTOLIC BLOOD PRESSURE: 72 MMHG | OXYGEN SATURATION: 97 % | WEIGHT: 158.4 LBS | SYSTOLIC BLOOD PRESSURE: 130 MMHG

## 2018-05-01 DIAGNOSIS — E78.00 HYPERCHOLESTEROLEMIA: ICD-10-CM

## 2018-05-01 DIAGNOSIS — J45.20 MILD INTERMITTENT EXTRINSIC ASTHMA WITHOUT COMPLICATION: ICD-10-CM

## 2018-05-01 DIAGNOSIS — R73.01 ELEVATED FASTING BLOOD SUGAR: ICD-10-CM

## 2018-05-01 DIAGNOSIS — R03.0 ELEVATED BLOOD-PRESSURE READING WITHOUT DIAGNOSIS OF HYPERTENSION: ICD-10-CM

## 2018-05-01 DIAGNOSIS — M48.07 SPINAL STENOSIS OF LUMBOSACRAL REGION: ICD-10-CM

## 2018-05-01 DIAGNOSIS — K58.2 IRRITABLE BOWEL SYNDROME WITH BOTH CONSTIPATION AND DIARRHEA: Primary | ICD-10-CM

## 2018-05-01 PROBLEM — M54.50 LOW BACK PAIN: Status: ACTIVE | Noted: 2017-04-12

## 2018-05-01 PROBLEM — M25.559 HIP PAIN: Status: ACTIVE | Noted: 2018-05-01

## 2018-05-01 PROCEDURE — 99214 OFFICE O/P EST MOD 30 MIN: CPT | Performed by: INTERNAL MEDICINE

## 2018-05-01 RX ORDER — GABAPENTIN 600 MG/1
1 TABLET ORAL EVERY 12 HOURS
COMMUNITY
Start: 2017-06-05 | End: 2021-01-12 | Stop reason: SDUPTHER

## 2018-05-01 NOTE — ASSESSMENT & PLAN NOTE
It is unclear whether she has htn  She is in pain from her back problems  She will have her bp taken in the am and in the pm and note if pain is present when bp is high  She does have a family hx of htn  Will consider starting meds based on results of two weeks worth of numbers

## 2018-05-01 NOTE — PROGRESS NOTES
Assessment/Plan:    Extrinsic asthma  She rarely uses her albuterol  Continue with prn albuterol     Elevated fasting blood sugar  Check a1c and ratio at follow up    Hypercholesterolemia  ldl elevated  She would like to diet  Will do low fat low chol     Elevated blood-pressure reading without diagnosis of hypertension  It is unclear whether she has htn  She is in pain from her back problems  She will have her bp taken in the am and in the pm and note if pain is present when bp is high  She does have a family hx of htn  Will consider starting meds based on results of two weeks worth of numbers  Spinal stenosis of lumbosacral region  Follow with Dr Sena Peraza and discuss whether she is a candidate for surgery       Diagnoses and all orders for this visit:    Irritable bowel syndrome with both constipation and diarrhea    Mild intermittent extrinsic asthma without complication    Elevated fasting blood sugar    Spinal stenosis of lumbosacral region    Hypercholesterolemia    Elevated blood-pressure reading without diagnosis of hypertension    Other orders  -     gabapentin (NEURONTIN) 600 MG tablet; Take 1 tablet by mouth Every 12 hours          Subjective:      Patient ID: Donna Mckeon is a 46 y o  female  Patient presents in follow up for her medical problems and to review recent lab work  She has chronic pain from spinal stenosis and has been seen by pain management and Dr Sena Peraza  He gave her an S-I Jt which didn't help  She is waiting to see if he feels she should go ahead with lumbar spinal surgery  She complains that her bp has been labile and can run as high as 160 but she says her pain is bad as the day wears on           The following portions of the patient's history were reviewed and updated as appropriate: allergies, current medications, past family history, past medical history, past social history, past surgical history and problem list     Review of Systems   Constitutional: Negative for activity change, appetite change, chills, diaphoresis, fatigue, fever and unexpected weight change  HENT: Negative for congestion, dental problem, drooling, ear discharge, ear pain, facial swelling, hearing loss, mouth sores, nosebleeds, postnasal drip, rhinorrhea, sinus pain, sinus pressure, sneezing, sore throat, tinnitus, trouble swallowing and voice change  Eyes: Negative for photophobia, pain, discharge, redness, itching and visual disturbance  Respiratory: Negative for apnea, cough, choking, chest tightness, shortness of breath, wheezing and stridor  Cardiovascular: Negative for chest pain, palpitations and leg swelling  Gastrointestinal: Negative for abdominal distention, abdominal pain, anal bleeding, blood in stool, constipation, diarrhea, nausea, rectal pain and vomiting  Endocrine: Negative for cold intolerance, heat intolerance, polydipsia, polyphagia and polyuria  Genitourinary: Negative for decreased urine volume, difficulty urinating, dysuria, enuresis, flank pain, frequency, genital sores, hematuria and urgency  Musculoskeletal: Negative for arthralgias, back pain, gait problem, joint swelling, myalgias, neck pain and neck stiffness  Skin: Negative for color change, pallor, rash and wound  Allergic/Immunologic: Negative for environmental allergies, food allergies and immunocompromised state  Neurological: Negative for dizziness, tremors, seizures, syncope, facial asymmetry, speech difficulty, weakness, light-headedness, numbness and headaches  Hematological: Negative for adenopathy  Does not bruise/bleed easily  Psychiatric/Behavioral: Negative for agitation, self-injury, sleep disturbance and suicidal ideas  The patient is not nervous/anxious            Objective:      /82 (BP Location: Left arm, Patient Position: Sitting)   Pulse 85   Ht 5' 1" (1 549 m)   Wt 71 8 kg (158 lb 6 4 oz)   SpO2 97%   BMI 29 93 kg/m²          Physical Exam   Constitutional: She is oriented to person, place, and time  She appears well-developed and well-nourished  No distress  HENT:   Head: Normocephalic and atraumatic  Right Ear: External ear normal    Left Ear: External ear normal    Mouth/Throat: Oropharynx is clear and moist    Eyes: Conjunctivae and EOM are normal  Pupils are equal, round, and reactive to light  No scleral icterus  Neck: Normal range of motion  Neck supple  No JVD present  No tracheal deviation present  No thyromegaly present  Cardiovascular: Normal rate, regular rhythm and intact distal pulses  Exam reveals no gallop and no friction rub  No murmur heard  Pulmonary/Chest: Effort normal and breath sounds normal  No respiratory distress  She has no wheezes  She has no rales  She exhibits no tenderness  Abdominal: Soft  Bowel sounds are normal  She exhibits no distension and no mass  There is no tenderness  There is no rebound and no guarding  Musculoskeletal: Normal range of motion  She exhibits no edema, tenderness or deformity  Lymphadenopathy:     She has no cervical adenopathy  Neurological: She is alert and oriented to person, place, and time  She has normal reflexes  No cranial nerve deficit  Coordination normal    Skin: Skin is warm and dry  No rash noted  She is not diaphoretic  No erythema  No pallor  Psychiatric: She has a normal mood and affect   Her behavior is normal  Judgment and thought content normal

## 2018-10-24 ENCOUNTER — OFFICE VISIT (OUTPATIENT)
Dept: INTERNAL MEDICINE CLINIC | Facility: CLINIC | Age: 53
End: 2018-10-24
Payer: COMMERCIAL

## 2018-10-24 VITALS
HEART RATE: 82 BPM | RESPIRATION RATE: 14 BRPM | HEIGHT: 61 IN | OXYGEN SATURATION: 97 % | BODY MASS INDEX: 29.45 KG/M2 | DIASTOLIC BLOOD PRESSURE: 82 MMHG | WEIGHT: 156 LBS | TEMPERATURE: 98.8 F | SYSTOLIC BLOOD PRESSURE: 126 MMHG

## 2018-10-24 DIAGNOSIS — J40 BRONCHITIS: Primary | ICD-10-CM

## 2018-10-24 DIAGNOSIS — J01.00 ACUTE NON-RECURRENT MAXILLARY SINUSITIS: ICD-10-CM

## 2018-10-24 PROCEDURE — 99214 OFFICE O/P EST MOD 30 MIN: CPT | Performed by: PHYSICIAN ASSISTANT

## 2018-10-24 PROCEDURE — 3008F BODY MASS INDEX DOCD: CPT | Performed by: PHYSICIAN ASSISTANT

## 2018-10-24 RX ORDER — BENZONATATE 200 MG/1
200 CAPSULE ORAL 3 TIMES DAILY PRN
Qty: 20 CAPSULE | Refills: 0 | Status: SHIPPED | OUTPATIENT
Start: 2018-10-24 | End: 2019-02-11 | Stop reason: CLARIF

## 2018-10-24 RX ORDER — AMOXICILLIN AND CLAVULANATE POTASSIUM 875; 125 MG/1; MG/1
1 TABLET, FILM COATED ORAL EVERY 12 HOURS SCHEDULED
Qty: 14 TABLET | Refills: 0 | Status: SHIPPED | OUTPATIENT
Start: 2018-10-24 | End: 2018-10-31

## 2018-10-24 RX ORDER — MOMETASONE FUROATE 50 UG/1
2 SPRAY, METERED NASAL DAILY
Qty: 17 G | Refills: 5 | Status: SHIPPED | OUTPATIENT
Start: 2018-10-24 | End: 2019-12-10 | Stop reason: SDUPTHER

## 2018-10-24 NOTE — PROGRESS NOTES
Assessment/Plan:  Will treat her for sinusitis on the left with antibiotics decongestants anti-inflammatories physical exam is unremarkable       Diagnoses and all orders for this visit:    Bronchitis  -     benzonatate (TESSALON) 200 MG capsule; Take 1 capsule (200 mg total) by mouth 3 (three) times a day as needed for cough  -     amoxicillin-clavulanate (AUGMENTIN) 875-125 mg per tablet; Take 1 tablet by mouth every 12 (twelve) hours for 7 days  -     mometasone (NASONEX) 50 mcg/act nasal spray; 2 sprays into each nostril daily    Acute non-recurrent maxillary sinusitis  -     benzonatate (TESSALON) 200 MG capsule; Take 1 capsule (200 mg total) by mouth 3 (three) times a day as needed for cough  -     amoxicillin-clavulanate (AUGMENTIN) 875-125 mg per tablet; Take 1 tablet by mouth every 12 (twelve) hours for 7 days  -     mometasone (NASONEX) 50 mcg/act nasal spray; 2 sprays into each nostril daily        No problem-specific Assessment & Plan notes found for this encounter  Subjective:      Patient ID: Tahir Garrido is a 48 y o  female  She developed feverishness chills generalized aching 4 days ago  Now she has a persistent tickly cough some wheezing at night left ear pain and pain over the left orbit area  Scratchy throat and carotidynia both sides  Currently no high fever or wheezing no chest pain or hemoptysis no stiff neck  Chronic back pain which is unchanged back surgery has been recommended      Cough   Associated symptoms include ear pain, rhinorrhea and a sore throat  Pertinent negatives include no chest pain, chills, eye redness, fever, headaches, myalgias, postnasal drip, rash, shortness of breath or wheezing  The following portions of the patient's history were reviewed and updated as appropriate:   She has a past medical history of Abdominal pain; Arthralgia of pelvic region and thigh; Chest pain; CTS (carpal tunnel syndrome); Fibromyalgia;  Hepatitis; Leukocytopenia; Leukorrhea; and Temporomandibular joint sounds on opening and/or closing the jaw ,   does not have any pertinent problems on file  ,   has a past surgical history that includes Cholecystectomy; Foot surgery (Right); Sinus surgery; and Incontinence surgery  ,  family history includes Alzheimer's disease in her father and maternal grandmother; Asthma in her father; Emphysema in her father and paternal grandmother; Heart attack in her father; Lymphoma in her maternal aunt  ,   reports that she has never smoked  She has never used smokeless tobacco  She reports that she does not drink alcohol or use drugs  ,  is allergic to other     Current Outpatient Prescriptions   Medication Sig Dispense Refill    albuterol (PROAIR HFA) 90 mcg/act inhaler Inhale 2 puffs every 4 (four) hours as needed      celecoxib (CeleBREX) 100 mg capsule Take 1 capsule (100 mg total) by mouth daily  0    gabapentin (NEURONTIN) 600 MG tablet Take 1 tablet by mouth Every 12 hours      Linaclotide (LINZESS) 145 MCG CAPS Take by mouth daily      acetaminophen (TYLENOL) 325 mg tablet Take by mouth      amoxicillin-clavulanate (AUGMENTIN) 875-125 mg per tablet Take 1 tablet by mouth every 12 (twelve) hours for 7 days 14 tablet 0    benzonatate (TESSALON) 200 MG capsule Take 1 capsule (200 mg total) by mouth 3 (three) times a day as needed for cough 20 capsule 0    Lactobacillus (ACIDOPHILUS) 100 MG CAPS Take by mouth      mometasone (NASONEX) 50 mcg/act nasal spray 2 sprays into each nostril daily 17 g 5     No current facility-administered medications for this visit  Review of Systems   Constitutional: Negative for activity change, appetite change, chills, diaphoresis, fatigue, fever and unexpected weight change  HENT: Positive for ear pain, rhinorrhea, sinus pain and sore throat   Negative for congestion, dental problem, drooling, ear discharge, facial swelling, hearing loss, nosebleeds, postnasal drip, sinus pressure, sneezing, tinnitus, trouble swallowing and voice change  Eyes: Negative for photophobia, pain, discharge, redness, itching and visual disturbance  Respiratory: Positive for cough  Negative for apnea, choking, chest tightness, shortness of breath, wheezing and stridor  Cardiovascular: Negative for chest pain, palpitations and leg swelling  Gastrointestinal: Negative for abdominal distention, abdominal pain, anal bleeding, blood in stool, constipation, diarrhea, nausea, rectal pain and vomiting  Endocrine: Negative for cold intolerance, heat intolerance, polydipsia, polyphagia and polyuria  Genitourinary: Negative for decreased urine volume, difficulty urinating, dysuria, enuresis, flank pain, frequency, genital sores, hematuria and urgency  Musculoskeletal: Positive for back pain  Negative for arthralgias, gait problem, joint swelling, myalgias, neck pain and neck stiffness  Skin: Negative for color change, pallor, rash and wound  Neurological: Negative for dizziness, tremors, seizures, syncope, facial asymmetry, speech difficulty, weakness, light-headedness, numbness and headaches  Hematological: Negative for adenopathy  Does not bruise/bleed easily  Psychiatric/Behavioral: Negative for agitation, behavioral problems, confusion, decreased concentration, dysphoric mood, hallucinations, self-injury, sleep disturbance and suicidal ideas  The patient is not nervous/anxious and is not hyperactive  Objective:  Vitals:    10/24/18 0923   BP: 126/82   Pulse: 82   Resp: 14   Temp: 98 8 °F (37 1 °C)   SpO2: 97%   Weight: 70 8 kg (156 lb)   Height: 5' 1" (1 549 m)     Body mass index is 29 48 kg/m²  Physical Exam   Constitutional: She is oriented to person, place, and time  She appears well-developed  HENT:   Head: Normocephalic  Right Ear: External ear normal    Left Ear: External ear normal    Mouth/Throat: Oropharynx is clear and moist  No oropharyngeal exudate     Tenderness over the left maxillary sinus bilateral carotidynia   Eyes: Pupils are equal, round, and reactive to light  Conjunctivae are normal    Neck: Neck supple  No thyromegaly present  Cardiovascular: Normal rate, regular rhythm, normal heart sounds and intact distal pulses  Pulmonary/Chest: Effort normal and breath sounds normal  No respiratory distress  She has no wheezes  She has no rales  She exhibits no tenderness  Abdominal: Soft  Bowel sounds are normal    Musculoskeletal: Normal range of motion  Lymphadenopathy:     She has no cervical adenopathy  Neurological: She is alert and oriented to person, place, and time  She has normal reflexes  Skin: Skin is warm and dry

## 2018-11-06 ENCOUNTER — TELEPHONE (OUTPATIENT)
Dept: INTERNAL MEDICINE CLINIC | Facility: CLINIC | Age: 53
End: 2018-11-06

## 2018-11-06 ENCOUNTER — APPOINTMENT (OUTPATIENT)
Dept: LAB | Facility: CLINIC | Age: 53
End: 2018-11-06
Payer: COMMERCIAL

## 2018-11-06 DIAGNOSIS — E78.00 HYPERCHOLESTEROLEMIA: ICD-10-CM

## 2018-11-06 DIAGNOSIS — R73.01 ELEVATED FASTING BLOOD SUGAR: ICD-10-CM

## 2018-11-06 LAB
ANION GAP SERPL CALCULATED.3IONS-SCNC: 4 MMOL/L (ref 4–13)
BUN SERPL-MCNC: 14 MG/DL (ref 5–25)
CALCIUM SERPL-MCNC: 8.9 MG/DL (ref 8.3–10.1)
CHLORIDE SERPL-SCNC: 105 MMOL/L (ref 100–108)
CHOLEST SERPL-MCNC: 208 MG/DL (ref 50–200)
CO2 SERPL-SCNC: 26 MMOL/L (ref 21–32)
CREAT SERPL-MCNC: 0.77 MG/DL (ref 0.6–1.3)
CREAT UR-MCNC: 152 MG/DL
EST. AVERAGE GLUCOSE BLD GHB EST-MCNC: 108 MG/DL
GFR SERPL CREATININE-BSD FRML MDRD: 88 ML/MIN/1.73SQ M
GLUCOSE P FAST SERPL-MCNC: 95 MG/DL (ref 65–99)
HBA1C MFR BLD: 5.4 % (ref 4.2–6.3)
HDLC SERPL-MCNC: 50 MG/DL (ref 40–60)
LDLC SERPL CALC-MCNC: 132 MG/DL (ref 0–100)
MICROALBUMIN UR-MCNC: <5 MG/L (ref 0–20)
MICROALBUMIN/CREAT 24H UR: <3 MG/G CREATININE (ref 0–30)
POTASSIUM SERPL-SCNC: 4 MMOL/L (ref 3.5–5.3)
SODIUM SERPL-SCNC: 135 MMOL/L (ref 136–145)
TRIGL SERPL-MCNC: 131 MG/DL

## 2018-11-06 PROCEDURE — 82043 UR ALBUMIN QUANTITATIVE: CPT | Performed by: INTERNAL MEDICINE

## 2018-11-06 PROCEDURE — 82570 ASSAY OF URINE CREATININE: CPT | Performed by: INTERNAL MEDICINE

## 2018-11-06 PROCEDURE — 36415 COLL VENOUS BLD VENIPUNCTURE: CPT

## 2018-11-06 PROCEDURE — 80061 LIPID PANEL: CPT

## 2018-11-06 PROCEDURE — 80048 BASIC METABOLIC PNL TOTAL CA: CPT

## 2018-11-06 PROCEDURE — 83036 HEMOGLOBIN GLYCOSYLATED A1C: CPT

## 2018-11-06 NOTE — TELEPHONE ENCOUNTER
----- Message from Tarun Chiu MD sent at 11/6/2018  1:32 PM EST -----  Bad cholesterol is elevated   Will she allow me to start a cholesterol med for her?  ----- Message -----  From: Lab, Background User  Sent: 11/6/2018  10:24 AM  To: Tarun Chiu MD

## 2018-11-13 ENCOUNTER — HOSPITAL ENCOUNTER (OUTPATIENT)
Dept: CT IMAGING | Facility: HOSPITAL | Age: 53
Discharge: HOME | End: 2018-11-13
Attending: INTERNAL MEDICINE
Payer: COMMERCIAL

## 2018-11-13 ENCOUNTER — OFFICE VISIT (OUTPATIENT)
Dept: INTERNAL MEDICINE CLINIC | Facility: CLINIC | Age: 53
End: 2018-11-13
Payer: COMMERCIAL

## 2018-11-13 ENCOUNTER — HOSPITAL ENCOUNTER (OUTPATIENT)
Dept: RADIOLOGY | Facility: HOSPITAL | Age: 53
Discharge: HOME | End: 2018-11-13
Attending: INTERNAL MEDICINE
Payer: COMMERCIAL

## 2018-11-13 VITALS
BODY MASS INDEX: 29.38 KG/M2 | RESPIRATION RATE: 14 BRPM | HEART RATE: 76 BPM | SYSTOLIC BLOOD PRESSURE: 138 MMHG | WEIGHT: 155.6 LBS | HEIGHT: 61 IN | DIASTOLIC BLOOD PRESSURE: 72 MMHG

## 2018-11-13 DIAGNOSIS — R10.31 RIGHT LOWER QUADRANT ABDOMINAL PAIN: ICD-10-CM

## 2018-11-13 DIAGNOSIS — R07.81 RIB PAIN ON RIGHT SIDE: ICD-10-CM

## 2018-11-13 DIAGNOSIS — N13.5 URETERAL STRICTURE, RIGHT: ICD-10-CM

## 2018-11-13 DIAGNOSIS — R73.01 ELEVATED FASTING BLOOD SUGAR: Primary | ICD-10-CM

## 2018-11-13 DIAGNOSIS — E78.00 HYPERCHOLESTEROLEMIA: ICD-10-CM

## 2018-11-13 DIAGNOSIS — R10.11 RIGHT UPPER QUADRANT PAIN: ICD-10-CM

## 2018-11-13 PROCEDURE — 71101 X-RAY EXAM UNILAT RIBS/CHEST: CPT

## 2018-11-13 PROCEDURE — 1036F TOBACCO NON-USER: CPT | Performed by: INTERNAL MEDICINE

## 2018-11-13 PROCEDURE — 99214 OFFICE O/P EST MOD 30 MIN: CPT | Performed by: INTERNAL MEDICINE

## 2018-11-13 PROCEDURE — 74176 CT ABD & PELVIS W/O CONTRAST: CPT

## 2018-11-13 NOTE — ASSESSMENT & PLAN NOTE
ldl remains above goal but she has dropped her ldl by 24 pts   Will continue to monitor while on dietary modification and will consider treatment if no further improvement at follow up

## 2018-11-13 NOTE — PROGRESS NOTES
Assessment/Plan:    Hypercholesterolemia  ldl remains above goal but she has dropped her ldl by 24 pts  Will continue to monitor while on dietary modification and will consider treatment if no further improvement at follow up     Elevated fasting blood sugar  Nl fbs and a1c  No evidence of DM      Right lower quadrant abdominal pain  Tender in right lower quadrant with guarding     Right upper quadrant pain  Tender in the lateral ribs on the right  Will order xray  Likely arthritic changes       Diagnoses and all orders for this visit:    Elevated fasting blood sugar  -     Comprehensive metabolic panel; Future    Hypercholesterolemia  -     Hepatic function panel; Future  -     Comprehensive metabolic panel; Future  -     TSH, 3rd generation; Future  -     Lipid Panel with Direct LDL reflex; Future    Right lower quadrant abdominal pain  -     CBC and differential; Future  -     CT abdomen pelvis wo contrast; Future    Right upper quadrant pain    Rib pain on right side  -     XR ribs 2 vw right; Future          Subjective:      Patient ID: Maribell Medrano is a 48 y o  female  Patient presents in follow up for her medical problems and to review recent labwork  She has about a 3 wk hx of intermittent ruq abdominal pain under her rib cage  6/10 in intensity  It comes and goes  She stopped taking celebrex as she was worried it could be a kidney issue  Not effected by eating  There is pain with movement like bending or twisting her torso  The following portions of the patient's history were reviewed and updated as appropriate: allergies, current medications, past family history, past medical history, past social history, past surgical history and problem list     Review of Systems   Constitutional: Negative for activity change, appetite change, chills, diaphoresis, fatigue, fever and unexpected weight change     HENT: Negative for congestion, dental problem, drooling, ear discharge, ear pain, facial swelling, hearing loss, mouth sores, nosebleeds, postnasal drip, rhinorrhea, sinus pain, sinus pressure, sneezing, sore throat, tinnitus, trouble swallowing and voice change  Eyes: Negative for photophobia, pain, discharge, redness, itching and visual disturbance  Respiratory: Negative for apnea, cough, choking, chest tightness, shortness of breath, wheezing and stridor  Cardiovascular: Negative for chest pain, palpitations and leg swelling  Gastrointestinal: Positive for abdominal pain  Negative for abdominal distention, anal bleeding, blood in stool, constipation, diarrhea, nausea, rectal pain and vomiting  Endocrine: Negative for cold intolerance, heat intolerance, polydipsia, polyphagia and polyuria  Genitourinary: Negative for decreased urine volume, difficulty urinating, dysuria, enuresis, flank pain, frequency, genital sores, hematuria and urgency  Musculoskeletal: Negative for arthralgias, back pain, gait problem, joint swelling, myalgias, neck pain and neck stiffness  Skin: Negative for color change, pallor, rash and wound  Allergic/Immunologic: Negative for environmental allergies, food allergies and immunocompromised state  Neurological: Negative for dizziness, tremors, seizures, syncope, facial asymmetry, speech difficulty, weakness, light-headedness, numbness and headaches  Hematological: Negative for adenopathy  Does not bruise/bleed easily  Psychiatric/Behavioral: Negative for agitation, self-injury, sleep disturbance and suicidal ideas  The patient is not nervous/anxious  Objective:      /72   Pulse 76   Resp 14   Ht 5' 1" (1 549 m)   Wt 70 6 kg (155 lb 9 6 oz)   BMI 29 40 kg/m²          Physical Exam   Constitutional: She is oriented to person, place, and time  She appears well-developed and well-nourished  No distress  HENT:   Head: Normocephalic and atraumatic     Right Ear: External ear normal    Left Ear: External ear normal    Mouth/Throat: Oropharynx is clear and moist    Eyes: Pupils are equal, round, and reactive to light  Conjunctivae and EOM are normal  No scleral icterus  Neck: Normal range of motion  Neck supple  No JVD present  No tracheal deviation present  No thyromegaly present  Cardiovascular: Normal rate, regular rhythm and intact distal pulses  Exam reveals no gallop and no friction rub  No murmur heard  Pulmonary/Chest: Effort normal and breath sounds normal  No respiratory distress  She has no wheezes  She has no rales  She exhibits no tenderness  Abdominal: Soft  Bowel sounds are normal  She exhibits no distension and no mass  There is tenderness  There is guarding  There is no rebound  Tenderness in the right lower quadrant with guarding  Musculoskeletal: Normal range of motion  She exhibits tenderness  She exhibits no edema or deformity  Tender in the right lateral ribs   Lymphadenopathy:     She has no cervical adenopathy  Neurological: She is alert and oriented to person, place, and time  She has normal reflexes  No cranial nerve deficit  Coordination normal    Skin: Skin is warm and dry  No rash noted  She is not diaphoretic  No erythema  No pallor  Psychiatric: She has a normal mood and affect   Her behavior is normal  Judgment and thought content normal

## 2019-02-11 ENCOUNTER — CONSULT (OUTPATIENT)
Dept: INTERNAL MEDICINE CLINIC | Facility: CLINIC | Age: 54
End: 2019-02-11
Payer: COMMERCIAL

## 2019-02-11 VITALS
TEMPERATURE: 97.7 F | OXYGEN SATURATION: 99 % | DIASTOLIC BLOOD PRESSURE: 78 MMHG | SYSTOLIC BLOOD PRESSURE: 132 MMHG | WEIGHT: 154.4 LBS | HEART RATE: 82 BPM | BODY MASS INDEX: 29.17 KG/M2

## 2019-02-11 DIAGNOSIS — M54.5 CHRONIC LOW BACK PAIN, UNSPECIFIED BACK PAIN LATERALITY, WITH SCIATICA PRESENCE UNSPECIFIED: ICD-10-CM

## 2019-02-11 DIAGNOSIS — G89.29 CHRONIC LOW BACK PAIN, UNSPECIFIED BACK PAIN LATERALITY, WITH SCIATICA PRESENCE UNSPECIFIED: ICD-10-CM

## 2019-02-11 DIAGNOSIS — Z01.818 PREOP EXAMINATION: ICD-10-CM

## 2019-02-11 DIAGNOSIS — J45.20 MILD INTERMITTENT EXTRINSIC ASTHMA WITHOUT COMPLICATION: Primary | ICD-10-CM

## 2019-02-11 DIAGNOSIS — R07.9 CHEST PAIN, UNSPECIFIED TYPE: ICD-10-CM

## 2019-02-11 DIAGNOSIS — M48.07 SPINAL STENOSIS OF LUMBOSACRAL REGION: ICD-10-CM

## 2019-02-11 PROCEDURE — 99214 OFFICE O/P EST MOD 30 MIN: CPT | Performed by: PHYSICIAN ASSISTANT

## 2019-02-11 PROCEDURE — 93000 ELECTROCARDIOGRAM COMPLETE: CPT | Performed by: PHYSICIAN ASSISTANT

## 2019-02-11 NOTE — PROGRESS NOTES
Assessment/Plan:  She is cleared for the back surgery her EKG is normal her labs a nuclear stress test are pending  I reviewed her preop labs and her nuclear stress test which are normal she is cleared for the surgery       Diagnoses and all orders for this visit:    Mild intermittent extrinsic asthma without complication  -     POCT ECG  -     CBC and differential; Future  -     Comprehensive metabolic panel; Future  -     Protime-INR; Future  -     NM myocardial perfusion spect (stress and/or rest); Future    Chronic low back pain, unspecified back pain laterality, with sciatica presence unspecified  -     POCT ECG  -     CBC and differential; Future  -     Comprehensive metabolic panel; Future  -     Protime-INR; Future  -     NM myocardial perfusion spect (stress and/or rest); Future    Spinal stenosis of lumbosacral region  -     POCT ECG  -     CBC and differential; Future  -     Comprehensive metabolic panel; Future  -     Protime-INR; Future  -     NM myocardial perfusion spect (stress and/or rest); Future    Preop examination  -     POCT ECG  -     CBC and differential; Future  -     Comprehensive metabolic panel; Future  -     Protime-INR; Future  -     NM myocardial perfusion spect (stress and/or rest); Future    Chest pain, unspecified type  -     POCT ECG  -     CBC and differential; Future  -     Comprehensive metabolic panel; Future  -     Protime-INR; Future  -     NM myocardial perfusion spect (stress and/or rest); Future        No problem-specific Assessment & Plan notes found for this encounter  Subjective:      Patient ID: Celsa Honeycutt is a 48 y o  female  She has a long history of low back pain trouble walking spinal stenosis and pain in her legs especially the right 1  She is now scheduled for definitive surgery  She is nervous about the surgery    In the past month she has noticed fleeting pains in the left pectoral region that come and go randomly somewhat tender when she squeezes her pectoral muscle no exertional pain no sweating palpitations dizziness nausea vomiting  She had a stress echo 2 years ago that was negative she has a family history of heart disease  History of asthma allergies which have been pretty well controlled with medication  No nausea vomiting difficulty urinating or incontinence no skin rashes or arthritis no shortness of breath orthopnea PND      The following portions of the patient's history were reviewed and updated as appropriate:   She has a past medical history of Abdominal pain, Arthralgia of pelvic region and thigh, Chest pain, CTS (carpal tunnel syndrome), Fibromyalgia, Hepatitis, Leukocytopenia, Leukorrhea, and Temporomandibular joint sounds on opening and/or closing the jaw ,  does not have any pertinent problems on file  ,   has a past surgical history that includes Cholecystectomy; Foot surgery (Right); Sinus surgery; and Incontinence surgery  ,  family history includes Alzheimer's disease in her father and maternal grandmother; Asthma in her father; Emphysema in her father and paternal grandmother; Heart attack in her father; Lymphoma in her maternal aunt  ,   reports that she has never smoked  She has never used smokeless tobacco  She reports that she does not drink alcohol or use drugs  ,  is allergic to other     Current Outpatient Medications   Medication Sig Dispense Refill    acetaminophen (TYLENOL) 325 mg tablet Take by mouth      albuterol (PROAIR HFA) 90 mcg/act inhaler Inhale 2 puffs every 4 (four) hours as needed      celecoxib (CeleBREX) 100 mg capsule Take 1 capsule (100 mg total) by mouth daily  0    gabapentin (NEURONTIN) 600 MG tablet Take 1 tablet by mouth Every 12 hours      Lactobacillus (ACIDOPHILUS) 100 MG CAPS Take by mouth      Linaclotide (LINZESS) 145 MCG CAPS Take by mouth daily      mometasone (NASONEX) 50 mcg/act nasal spray 2 sprays into each nostril daily 17 g 5     No current facility-administered medications for this visit  Review of Systems   Constitutional: Negative for activity change, appetite change, chills, diaphoresis, fatigue, fever and unexpected weight change  HENT: Negative for congestion, dental problem, drooling, ear discharge, ear pain, facial swelling, hearing loss, nosebleeds, postnasal drip, rhinorrhea, sinus pressure, sinus pain, sneezing, sore throat, tinnitus, trouble swallowing and voice change  Eyes: Negative for photophobia, pain, discharge, redness, itching and visual disturbance  Respiratory: Negative for apnea, cough, choking, chest tightness, shortness of breath, wheezing and stridor  Cardiovascular: Positive for chest pain  Negative for palpitations and leg swelling  Gastrointestinal: Negative for abdominal distention, abdominal pain, anal bleeding, blood in stool, constipation, diarrhea, nausea, rectal pain and vomiting  Endocrine: Negative for cold intolerance, heat intolerance, polydipsia, polyphagia and polyuria  Genitourinary: Negative for decreased urine volume, difficulty urinating, dysuria, enuresis, flank pain, frequency, genital sores, hematuria and urgency  Musculoskeletal: Positive for back pain and gait problem  Negative for arthralgias, joint swelling, myalgias, neck pain and neck stiffness  Skin: Negative for color change, pallor, rash and wound  Neurological: Negative for dizziness, tremors, seizures, syncope, facial asymmetry, speech difficulty, weakness, light-headedness, numbness and headaches  Hematological: Negative for adenopathy  Does not bruise/bleed easily  Psychiatric/Behavioral: Negative for agitation, behavioral problems, confusion, decreased concentration, dysphoric mood, hallucinations, self-injury, sleep disturbance and suicidal ideas  The patient is not nervous/anxious and is not hyperactive            Objective:  Vitals:    02/11/19 1537   BP: 132/78   BP Location: Left arm   Pulse: 82   Temp: 97 7 °F (36 5 °C)   TempSrc: Oral SpO2: 99%   Weight: 70 kg (154 lb 6 4 oz)     Body mass index is 29 17 kg/m²  Physical Exam   Constitutional: She is oriented to person, place, and time  She appears well-developed  Obese   HENT:   Head: Normocephalic  Right Ear: External ear normal    Left Ear: External ear normal    Mouth/Throat: Oropharynx is clear and moist    Eyes: Pupils are equal, round, and reactive to light  Conjunctivae are normal    Neck: Neck supple  No thyromegaly present  Cardiovascular: Normal rate, regular rhythm, normal heart sounds and intact distal pulses  Exam reveals no gallop and no friction rub  No murmur heard  Pulmonary/Chest: Effort normal and breath sounds normal  No stridor  No respiratory distress  She has no wheezes  She has no rales  She exhibits no tenderness  Abdominal: Soft  Bowel sounds are normal    Musculoskeletal: Normal range of motion  She exhibits tenderness (Generally in the low back region limited motion of her lumbar spine due to pain stooped gait)  She exhibits no edema or deformity  Neurological: She is alert and oriented to person, place, and time  She has normal reflexes  She displays normal reflexes  No cranial nerve deficit or sensory deficit  She exhibits normal muscle tone  Coordination normal    Skin: Skin is warm and dry

## 2019-02-13 ENCOUNTER — TELEPHONE (OUTPATIENT)
Dept: INTERNAL MEDICINE CLINIC | Facility: CLINIC | Age: 54
End: 2019-02-13

## 2019-02-13 NOTE — TELEPHONE ENCOUNTER
Left vm with Susie Pang that no Allison Rehman is needed for her Myocardial; [facility] for 2/19       Call ref#: [id number], Teresa burns, Emigdio Erickson, 2/13, 8:50AM EST

## 2019-02-14 ENCOUNTER — APPOINTMENT (OUTPATIENT)
Dept: LAB | Facility: HOSPITAL | Age: 54
End: 2019-02-14
Payer: COMMERCIAL

## 2019-02-14 DIAGNOSIS — J45.20 MILD INTERMITTENT EXTRINSIC ASTHMA WITHOUT COMPLICATION: ICD-10-CM

## 2019-02-14 DIAGNOSIS — Z01.818 PREOP EXAMINATION: ICD-10-CM

## 2019-02-14 DIAGNOSIS — M54.5 CHRONIC LOW BACK PAIN, UNSPECIFIED BACK PAIN LATERALITY, WITH SCIATICA PRESENCE UNSPECIFIED: ICD-10-CM

## 2019-02-14 DIAGNOSIS — G89.29 CHRONIC LOW BACK PAIN, UNSPECIFIED BACK PAIN LATERALITY, WITH SCIATICA PRESENCE UNSPECIFIED: ICD-10-CM

## 2019-02-14 DIAGNOSIS — R07.9 CHEST PAIN, UNSPECIFIED TYPE: ICD-10-CM

## 2019-02-14 DIAGNOSIS — M48.07 SPINAL STENOSIS OF LUMBOSACRAL REGION: ICD-10-CM

## 2019-02-14 LAB
ALBUMIN SERPL BCP-MCNC: 3.6 G/DL (ref 3.5–5)
ALP SERPL-CCNC: 96 U/L (ref 46–116)
ALT SERPL W P-5'-P-CCNC: 30 U/L (ref 12–78)
ANION GAP SERPL CALCULATED.3IONS-SCNC: 10 MMOL/L (ref 4–13)
AST SERPL W P-5'-P-CCNC: 24 U/L (ref 5–45)
BASOPHILS # BLD AUTO: 0.03 THOUSANDS/ΜL (ref 0–0.1)
BASOPHILS NFR BLD AUTO: 1 % (ref 0–1)
BILIRUB SERPL-MCNC: 0.4 MG/DL (ref 0.2–1)
BUN SERPL-MCNC: 10 MG/DL (ref 5–25)
CALCIUM SERPL-MCNC: 9.2 MG/DL (ref 8.3–10.1)
CHLORIDE SERPL-SCNC: 107 MMOL/L (ref 100–108)
CO2 SERPL-SCNC: 26 MMOL/L (ref 21–32)
CREAT SERPL-MCNC: 0.72 MG/DL (ref 0.6–1.3)
EOSINOPHIL # BLD AUTO: 0.07 THOUSAND/ΜL (ref 0–0.61)
EOSINOPHIL NFR BLD AUTO: 1 % (ref 0–6)
ERYTHROCYTE [DISTWIDTH] IN BLOOD BY AUTOMATED COUNT: 12.3 % (ref 11.6–15.1)
GFR SERPL CREATININE-BSD FRML MDRD: 96 ML/MIN/1.73SQ M
GLUCOSE P FAST SERPL-MCNC: 91 MG/DL (ref 65–99)
HCT VFR BLD AUTO: 39 % (ref 34.8–46.1)
HGB BLD-MCNC: 12.8 G/DL (ref 11.5–15.4)
IMM GRANULOCYTES # BLD AUTO: 0.03 THOUSAND/UL (ref 0–0.2)
IMM GRANULOCYTES NFR BLD AUTO: 1 % (ref 0–2)
INR PPP: 0.95 (ref 0.86–1.17)
LYMPHOCYTES # BLD AUTO: 1.59 THOUSANDS/ΜL (ref 0.6–4.47)
LYMPHOCYTES NFR BLD AUTO: 26 % (ref 14–44)
MCH RBC QN AUTO: 30.2 PG (ref 26.8–34.3)
MCHC RBC AUTO-ENTMCNC: 32.8 G/DL (ref 31.4–37.4)
MCV RBC AUTO: 92 FL (ref 82–98)
MONOCYTES # BLD AUTO: 0.27 THOUSAND/ΜL (ref 0.17–1.22)
MONOCYTES NFR BLD AUTO: 4 % (ref 4–12)
NEUTROPHILS # BLD AUTO: 4.18 THOUSANDS/ΜL (ref 1.85–7.62)
NEUTS SEG NFR BLD AUTO: 67 % (ref 43–75)
NRBC BLD AUTO-RTO: 0 /100 WBCS
PLATELET # BLD AUTO: 261 THOUSANDS/UL (ref 149–390)
PMV BLD AUTO: 10.6 FL (ref 8.9–12.7)
POTASSIUM SERPL-SCNC: 4.3 MMOL/L (ref 3.5–5.3)
PROT SERPL-MCNC: 7.7 G/DL (ref 6.4–8.2)
PROTHROMBIN TIME: 12.6 SECONDS (ref 11.8–14.2)
RBC # BLD AUTO: 4.24 MILLION/UL (ref 3.81–5.12)
SODIUM SERPL-SCNC: 143 MMOL/L (ref 136–145)
WBC # BLD AUTO: 6.17 THOUSAND/UL (ref 4.31–10.16)

## 2019-02-14 PROCEDURE — 85610 PROTHROMBIN TIME: CPT

## 2019-02-14 PROCEDURE — 36415 COLL VENOUS BLD VENIPUNCTURE: CPT

## 2019-02-14 PROCEDURE — 80053 COMPREHEN METABOLIC PANEL: CPT

## 2019-02-14 PROCEDURE — 85025 COMPLETE CBC W/AUTO DIFF WBC: CPT

## 2019-02-19 ENCOUNTER — HOSPITAL ENCOUNTER (OUTPATIENT)
Dept: NON INVASIVE DIAGNOSTICS | Facility: CLINIC | Age: 54
Discharge: HOME | End: 2019-02-19
Payer: COMMERCIAL

## 2019-02-19 DIAGNOSIS — G89.29 CHRONIC LOW BACK PAIN, UNSPECIFIED BACK PAIN LATERALITY, WITH SCIATICA PRESENCE UNSPECIFIED: ICD-10-CM

## 2019-02-19 DIAGNOSIS — M54.5 CHRONIC LOW BACK PAIN, UNSPECIFIED BACK PAIN LATERALITY, WITH SCIATICA PRESENCE UNSPECIFIED: ICD-10-CM

## 2019-02-19 DIAGNOSIS — M48.07 SPINAL STENOSIS OF LUMBOSACRAL REGION: ICD-10-CM

## 2019-02-19 DIAGNOSIS — Z01.818 PREOP EXAMINATION: ICD-10-CM

## 2019-02-19 DIAGNOSIS — J45.20 MILD INTERMITTENT EXTRINSIC ASTHMA WITHOUT COMPLICATION: ICD-10-CM

## 2019-02-19 DIAGNOSIS — R07.9 CHEST PAIN, UNSPECIFIED TYPE: ICD-10-CM

## 2019-02-19 PROCEDURE — 93018 CV STRESS TEST I&R ONLY: CPT | Performed by: INTERNAL MEDICINE

## 2019-02-19 PROCEDURE — 93017 CV STRESS TEST TRACING ONLY: CPT

## 2019-02-19 PROCEDURE — A9502 TC99M TETROFOSMIN: HCPCS

## 2019-02-19 PROCEDURE — 93016 CV STRESS TEST SUPVJ ONLY: CPT | Performed by: INTERNAL MEDICINE

## 2019-02-19 PROCEDURE — 78452 HT MUSCLE IMAGE SPECT MULT: CPT

## 2019-02-19 PROCEDURE — 78452 HT MUSCLE IMAGE SPECT MULT: CPT | Performed by: INTERNAL MEDICINE

## 2019-02-19 RX ORDER — AMINOPHYLLINE DIHYDRATE 25 MG/ML
50 INJECTION, SOLUTION INTRAVENOUS ONCE
Status: COMPLETED | OUTPATIENT
Start: 2019-02-19 | End: 2019-02-19

## 2019-02-19 RX ADMIN — AMINOPHYLLINE 50 MG: 25 INJECTION, SOLUTION INTRAVENOUS at 09:49

## 2019-02-19 RX ADMIN — REGADENOSON 0.4 MG: 0.08 INJECTION, SOLUTION INTRAVENOUS at 09:44

## 2019-02-20 LAB
ARRHY DURING EX: NORMAL
CHEST PAIN STATEMENT: NORMAL
MAX DIASTOLIC BP: 76 MMHG
MAX HEART RATE: 129 BPM
MAX PREDICTED HEART RATE: 167 BPM
MAX. SYSTOLIC BP: 156 MMHG
PROTOCOL NAME: NORMAL
REASON FOR TERMINATION: NORMAL
TARGET HR FORMULA: NORMAL
TEST INDICATION: NORMAL
TIME IN EXERCISE PHASE: NORMAL

## 2019-02-22 ENCOUNTER — TELEPHONE (OUTPATIENT)
Dept: INTERNAL MEDICINE CLINIC | Facility: CLINIC | Age: 54
End: 2019-02-22

## 2019-02-22 NOTE — TELEPHONE ENCOUNTER
PATIENT CALLED AND SAID THAT DR FORTUNATO TAO NEEDS ALL THE PRE OP REPORTS FROM HER VISIT SENT TO THEM  FAX #   [phone]  DR TAO #  [phone]

## 2019-02-26 ENCOUNTER — TELEPHONE (OUTPATIENT)
Dept: INTERNAL MEDICINE CLINIC | Facility: CLINIC | Age: 54
End: 2019-02-26

## 2019-02-26 DIAGNOSIS — Z01.818 PREOP EXAMINATION: Primary | ICD-10-CM

## 2019-02-26 NOTE — TELEPHONE ENCOUNTER
Patient called  She was in for a Pre Op appointment with Naun Morales and she was contacted by her surgeon's office because the copy of the appointment wasn't cosigned by a DO  She contacted Naun Morales via [employer] and was told Dr Miriam Isbell would be cosigning but the surgeon's office hasn't received the new copy as of this morning  Sabiha is the person who needs to receive it  -They also need confirmation of her never having hepatitis (she said it's because she has a fatty liver)  Can we do this for her? She's having surgery on Tuesday and she's not cleared for surgery yet)    Dr Kaylyn Cardoza  [facility]#[phone]    Patient's ph# [phone]- Please call her when these 2 pieces of information are faxed

## 2019-02-26 NOTE — TELEPHONE ENCOUNTER
CALLED PT  WAS NOTIFIED OF LAB ORDER AND SHE WILL GO TO [facility] TOMORROW AM TO HAVE THIS DONE AND NOTE HAS BEEN FAXED

## 2019-02-27 ENCOUNTER — APPOINTMENT (OUTPATIENT)
Dept: LAB | Facility: HOSPITAL | Age: 54
End: 2019-02-27
Payer: COMMERCIAL

## 2019-02-27 DIAGNOSIS — Z01.818 PREOP EXAMINATION: ICD-10-CM

## 2019-02-27 LAB
HAV IGM SER QL: NORMAL
HBV CORE AB SER QL: NORMAL
HBV CORE IGM SER QL: NORMAL
HBV CORE IGM SER QL: NORMAL
HBV SURFACE AG SER QL: NORMAL
HBV SURFACE AG SER QL: NORMAL
HCV AB SER QL: NORMAL
HCV AB SER QL: NORMAL

## 2019-02-27 PROCEDURE — 80074 ACUTE HEPATITIS PANEL: CPT

## 2019-02-27 PROCEDURE — 36415 COLL VENOUS BLD VENIPUNCTURE: CPT

## 2019-02-27 PROCEDURE — 86704 HEP B CORE ANTIBODY TOTAL: CPT

## 2019-02-28 ENCOUNTER — TELEPHONE (OUTPATIENT)
Dept: INTERNAL MEDICINE CLINIC | Facility: CLINIC | Age: 54
End: 2019-02-28

## 2019-02-28 NOTE — TELEPHONE ENCOUNTER
She saw Cherri Shells 2 11 19  For surgical clearance  Roni Chavarria signed the form, but [facility], said Dr Rafita Willams  needs to sign this form too    She also had hepatitis tests done which were negative    The form also has to state that she doesn't have hepatitis        She said office visit notes were sent    But they need the actual form faxed  Her surgeon is Payton Saunders    And this clearance form needs to be faxed to   ATTN: Naveed Agosto  Fax [phone]  TODAY  [place] [facility] PLEASE    Her surgery is 03/05/19

## 2019-03-12 ENCOUNTER — TRANSITIONAL CARE MANAGEMENT (OUTPATIENT)
Dept: INTERNAL MEDICINE CLINIC | Facility: CLINIC | Age: 54
End: 2019-03-12

## 2019-03-18 ENCOUNTER — TRANSCRIBE ORDERS (OUTPATIENT)
Dept: ADMINISTRATIVE | Facility: HOSPITAL | Age: 54
End: 2019-03-18

## 2019-03-18 DIAGNOSIS — M43.16 SPONDYLOLISTHESIS OF LUMBAR REGION: ICD-10-CM

## 2019-03-18 DIAGNOSIS — Z98.1 ARTHRODESIS STATUS: Primary | ICD-10-CM

## 2019-03-20 ENCOUNTER — OFFICE VISIT (OUTPATIENT)
Dept: INTERNAL MEDICINE CLINIC | Facility: CLINIC | Age: 54
End: 2019-03-20
Payer: COMMERCIAL

## 2019-03-20 VITALS
OXYGEN SATURATION: 95 % | DIASTOLIC BLOOD PRESSURE: 84 MMHG | HEIGHT: 61 IN | HEART RATE: 103 BPM | BODY MASS INDEX: 28.89 KG/M2 | SYSTOLIC BLOOD PRESSURE: 130 MMHG | WEIGHT: 153 LBS

## 2019-03-20 DIAGNOSIS — Z98.1 S/P LUMBAR FUSION: Chronic | ICD-10-CM

## 2019-03-20 DIAGNOSIS — M51.26 HNP (HERNIATED NUCLEUS PULPOSUS), LUMBAR: ICD-10-CM

## 2019-03-20 DIAGNOSIS — M48.07 SPINAL STENOSIS OF LUMBOSACRAL REGION: Primary | ICD-10-CM

## 2019-03-20 PROBLEM — R73.01 ELEVATED FASTING BLOOD SUGAR: Status: RESOLVED | Noted: 2018-05-01 | Resolved: 2019-03-20

## 2019-03-20 PROBLEM — R03.0 ELEVATED BLOOD-PRESSURE READING WITHOUT DIAGNOSIS OF HYPERTENSION: Status: RESOLVED | Noted: 2018-05-01 | Resolved: 2019-03-20

## 2019-03-20 PROBLEM — R10.11 RIGHT UPPER QUADRANT PAIN: Status: RESOLVED | Noted: 2018-11-13 | Resolved: 2019-03-20

## 2019-03-20 PROBLEM — M25.559 HIP PAIN: Status: RESOLVED | Noted: 2018-05-01 | Resolved: 2019-03-20

## 2019-03-20 PROBLEM — E78.00 HYPERCHOLESTEROLEMIA: Chronic | Status: ACTIVE | Noted: 2018-05-01

## 2019-03-20 PROBLEM — M54.50 LOW BACK PAIN: Status: RESOLVED | Noted: 2017-04-12 | Resolved: 2019-03-20

## 2019-03-20 PROBLEM — R10.31 RIGHT LOWER QUADRANT ABDOMINAL PAIN: Status: RESOLVED | Noted: 2018-11-13 | Resolved: 2019-03-20

## 2019-03-20 PROCEDURE — 99495 TRANSJ CARE MGMT MOD F2F 14D: CPT | Performed by: INTERNAL MEDICINE

## 2019-03-20 RX ORDER — OXYCODONE HYDROCHLORIDE AND ACETAMINOPHEN 5; 325 MG/1; MG/1
TABLET ORAL
COMMUNITY
Start: 2019-03-18 | End: 2019-12-10 | Stop reason: CLARIF

## 2019-03-20 RX ORDER — CYCLOBENZAPRINE HCL 10 MG
TABLET ORAL
COMMUNITY
Start: 2019-03-18 | End: 2019-05-23 | Stop reason: CLARIF

## 2019-03-20 RX ORDER — POLYETHYLENE GLYCOL 3350 17 G/17G
POWDER, FOR SOLUTION ORAL
COMMUNITY
Start: 2019-03-06 | End: 2019-03-20 | Stop reason: CLARIF

## 2019-03-20 RX ORDER — METHYLPREDNISOLONE 4 MG/1
TABLET ORAL
Refills: 0 | COMMUNITY
Start: 2019-03-11 | End: 2019-03-20 | Stop reason: CLARIF

## 2019-03-20 NOTE — PATIENT INSTRUCTIONS
Lumbar Spinal Fusion   WHAT YOU NEED TO KNOW:   What do I need to know about lumbar spinal fusion? Lumbar spinal fusion is a surgery to treat an injury or problems with your lumbar spine (lower back)  In lumbar spinal fusion, 2 or more vertebrae are joined together using bone grafts or implants, screws, and rods  This will help stabilize your back and may reduce pain  How do I prepare for surgery? Your healthcare provider will tell you not to eat or drink anything after midnight on the day of your surgery  He will tell you what medicines to take or not take on the day of your surgery  Your healthcare provider may recommend that you donate blood before your surgery  It will be saved and given to you if you bleed too much during surgery  What will happen during surgery? · You will be given medicine to keep you asleep and free from pain during the surgery  Your surgeon will make an incision in the skin of your lower back or side near the vertebrae  He may remove part of some of the vertebrae, disks between the vertebrae, or tissue near the area  · Your surgeon may take a small piece of bone from your hip to use as a bone graft  Instead, he may use an artificial implant as the graft  He will insert the graft or implant between the vertebrae to be joined together  He may use screws or bands to hold the bones and graft in place  He will close the incision with stitches and cover it with bandages  What are the risks of lumbar spinal fusion? Surgery may cause bleeding or an infection  You may be at an increased risk for blood clots or have problems with your lungs  Nerves, blood vessels, ligaments, muscles, and bones near your spine may be damaged  You may develop weakness or numbness in your legs or lose bladder and bowel function  Even after a successful surgery, you may still have back pain or problems moving your back or legs  CARE AGREEMENT:   You have the right to help plan your care   Learn about your health condition and how it may be treated  Discuss treatment options with your caregivers to decide what care you want to receive  You always have the right to refuse treatment  The above information is an  only  It is not intended as medical advice for individual conditions or treatments  Talk to your doctor, nurse or pharmacist before following any medical regimen to see if it is safe and effective for you  © 2017 [employer] Information is for End User's use only and may not be sold, redistributed or otherwise used for commercial purposes  All illustrations and images included in CareNotes® are the copyrighted property of A D A M , Inc  or Jason Benavidez

## 2019-03-20 NOTE — PROGRESS NOTES
INTERNAL MEDICINE TRANSITION OF CARE OFFICE VISIT  [facility]    NAME: Az Montoya  AGE: 48 y o  SEX: female  : 1965     DATE: 3/20/2019     Assessment and Plan:     1  Spinal stenosis of lumbosacral region     2  HNP (herniated nucleus pulposus), lumbar     3  S/P lumbar fusion       Records reviewed from [facility]  She is recovering as expected in the post-op period  She will start physical therapy tomorrow  Follow-up with surgeons as scheduled  Wounds are healing nicely  Transitional Care Management Review:     Az Montoya is a 48 y o  female here for TCM follow-up    During the TCM phone call patient stated:    TCM Call (since 2019)     Date and time call was made  3/12/2019 11:11 AM    Hospital care reviewed  Records reviewed    Patient was hospitialized at  Other (comment)    Comment  [facility]    Date of Admission  19    Date of discharge  19    Diagnosis  Lumbar Susrg    Disposition  Home    Were the patients medications reviewed and updated  No Instructed to bring updated med list to TCM appt  Current Symptoms  Leg pain - right side; Leg pain - left side    Right side leg pain severity  Moderate    Leg pain, right side, onset  Ongoing      TCM Call (since 2019)     Post hospital issues  None    Scheduled for follow up?   Yes    Patients specialists  Other (comment)    Other specialists names  Ortho Surg-Dr Hipolito Medrano    Did you obtain your prescribed medications  Yes percocet, prednisone    Do you need help managing your prescriptions or medications  No    Is transportation to your appointment needed  No    I have advised the patient to call PCP with any new or worsening symptoms  Cole Laughter, LPN    Living Arrangements  Spouse or Significiant other    Interperter language line needed  No    Counseling  Patient    Counseling topics  Importance of RX compliance           HPI:     Patient had been suffering with back pain for around 3 years  Patient had tried and failed conservative treatment  She ultimately elected to undergo anterior/posterior lumbar fusion down at [facility]  Patient had no complications after surgery  She has had no problems with her wound in regard to drainage or wound dehiscence  She denies any fever, chills, night sweats  She is going to start physical therapy at [facility] tomorrow  Her pain is relatively well controlled  Still having a little trouble with sleeping  She would rate her pain between mild to moderate  She is not having any difficulty moving her bowels  She has a couple wounds that need to be assessed today to remove any sutures  The following portions of the patient's history were reviewed and updated as appropriate: allergies, current medications, past family history, past medical history, past social history, past surgical history and problem list      Review of Systems:     Review of Systems   Constitutional: Negative for activity change, appetite change and fatigue  Respiratory: Negative for apnea, cough, chest tightness, shortness of breath and wheezing  Cardiovascular: Negative for chest pain, palpitations and leg swelling  Gastrointestinal: Negative for abdominal distention, abdominal pain, blood in stool, constipation, diarrhea, nausea and vomiting  Musculoskeletal: Positive for arthralgias, back pain and gait problem  Negative for joint swelling and myalgias  Skin: Positive for wound  Negative for rash  Neurological: Negative for dizziness, weakness, light-headedness, numbness and headaches  Psychiatric/Behavioral: Negative for behavioral problems, confusion, hallucinations, sleep disturbance and suicidal ideas  The patient is not nervous/anxious         Problem List:     Patient Active Problem List   Diagnosis    Extrinsic asthma    Spinal stenosis of lumbosacral region    Irritable bowel syndrome    Hypercholesterolemia    HNP (herniated nucleus pulposus), lumbar    S/P lumbar fusion      Objective:     /84   Pulse 103   Ht 5' 1" (1 549 m)   Wt 69 4 kg (153 lb)   SpO2 95%   BMI 28 91 kg/m²     Physical Exam   Constitutional: She is oriented to person, place, and time  She appears well-developed and well-nourished  No distress  Eyes: Conjunctivae are normal  Right eye exhibits no discharge  Left eye exhibits no discharge  No scleral icterus  Neck: Neck supple  No JVD present  No thyromegaly present  Cardiovascular: Normal rate, regular rhythm, normal heart sounds and intact distal pulses  Exam reveals no gallop and no friction rub  No murmur heard  Pulmonary/Chest: Effort normal and breath sounds normal  No respiratory distress  She has no wheezes  She has no rales  She exhibits no tenderness  Abdominal: Soft  Bowel sounds are normal  She exhibits no distension and no mass  There is no tenderness  There is no rebound and no guarding  Musculoskeletal: Normal range of motion  She exhibits no edema  Lymphadenopathy:     She has no cervical adenopathy  Neurological: She is alert and oriented to person, place, and time  Skin: Skin is warm and dry  She is not diaphoretic  Anterior low anterior abdominal incision healing well without any drainage/erythema  4 lower lumbar/sacral incisions healing well without any drainage/erythema   Psychiatric: She has a normal mood and affect  Her behavior is normal      Laboratory Results: I have personally reviewed the pertinent laboratory results/reports     Radiology/Other Diagnostic Testing Results: I have personally reviewed pertinent reports             Current Medications:     Outpatient Medications Prior to Visit   Medication Sig Dispense Refill    cyclobenzaprine (FLEXERIL) 10 mg tablet       gabapentin (NEURONTIN) 600 MG tablet Take 1 tablet by mouth Every 12 hours      Linaclotide (LINZESS) 145 MCG CAPS Take by mouth daily      mometasone (NASONEX) 50 mcg/act nasal spray 2 sprays into each nostril daily 17 g 5    oxyCODONE-acetaminophen (PERCOCET) 5-325 mg per tablet       acetaminophen (TYLENOL) 325 mg tablet Take by mouth      albuterol (PROAIR HFA) 90 mcg/act inhaler Inhale 2 puffs every 4 (four) hours as needed      celecoxib (CeleBREX) 100 mg capsule Take 1 capsule (100 mg total) by mouth daily (Patient not taking: Reported on 3/20/2019)  0    Lactobacillus (ACIDOPHILUS) 100 MG CAPS Take by mouth      methylPREDNISolone 4 MG tablet therapy pack TAKE 1 TABLET BY MOUTH USE MEDICATIONS AS DIRECTED BY PROVIDER   0    polyethylene glycol (GLYCOLAX) powder        No facility-administered medications prior to visit          Tamiko Rose DO  [facility]

## 2019-03-22 ENCOUNTER — HOSPITAL ENCOUNTER (OUTPATIENT)
Dept: CT IMAGING | Facility: CLINIC | Age: 54
Discharge: HOME | End: 2019-03-22
Payer: COMMERCIAL

## 2019-03-22 DIAGNOSIS — M43.16 SPONDYLOLISTHESIS OF LUMBAR REGION: ICD-10-CM

## 2019-03-22 DIAGNOSIS — Z98.1 ARTHRODESIS STATUS: ICD-10-CM

## 2019-03-22 PROCEDURE — 72131 CT LUMBAR SPINE W/O DYE: CPT

## 2019-04-02 ENCOUNTER — APPOINTMENT (OUTPATIENT)
Dept: LAB | Facility: CLINIC | Age: 54
End: 2019-04-02
Payer: COMMERCIAL

## 2019-04-02 ENCOUNTER — CONSULT (OUTPATIENT)
Dept: INTERNAL MEDICINE CLINIC | Facility: CLINIC | Age: 54
End: 2019-04-02
Payer: COMMERCIAL

## 2019-04-02 VITALS
HEIGHT: 60 IN | HEART RATE: 89 BPM | OXYGEN SATURATION: 99 % | TEMPERATURE: 97.5 F | DIASTOLIC BLOOD PRESSURE: 82 MMHG | WEIGHT: 156.2 LBS | SYSTOLIC BLOOD PRESSURE: 118 MMHG | BODY MASS INDEX: 30.67 KG/M2

## 2019-04-02 DIAGNOSIS — M48.07 SPINAL STENOSIS OF LUMBOSACRAL REGION: ICD-10-CM

## 2019-04-02 DIAGNOSIS — E78.00 HYPERCHOLESTEROLEMIA: ICD-10-CM

## 2019-04-02 DIAGNOSIS — J45.20 MILD INTERMITTENT EXTRINSIC ASTHMA WITHOUT COMPLICATION: ICD-10-CM

## 2019-04-02 DIAGNOSIS — M51.26 HNP (HERNIATED NUCLEUS PULPOSUS), LUMBAR: ICD-10-CM

## 2019-04-02 DIAGNOSIS — M48.07 SPINAL STENOSIS OF LUMBOSACRAL REGION: Primary | ICD-10-CM

## 2019-04-02 LAB
ALBUMIN SERPL BCP-MCNC: 3.5 G/DL (ref 3.5–5)
ALP SERPL-CCNC: 134 U/L (ref 46–116)
ALT SERPL W P-5'-P-CCNC: 47 U/L (ref 12–78)
ANION GAP SERPL CALCULATED.3IONS-SCNC: 5 MMOL/L (ref 4–13)
APTT PPP: 28 SECONDS (ref 26–38)
AST SERPL W P-5'-P-CCNC: 23 U/L (ref 5–45)
BASOPHILS # BLD AUTO: 0.04 THOUSANDS/ΜL (ref 0–0.1)
BASOPHILS NFR BLD AUTO: 1 % (ref 0–1)
BILIRUB SERPL-MCNC: 0.17 MG/DL (ref 0.2–1)
BUN SERPL-MCNC: 11 MG/DL (ref 5–25)
CALCIUM SERPL-MCNC: 8.7 MG/DL (ref 8.3–10.1)
CHLORIDE SERPL-SCNC: 105 MMOL/L (ref 100–108)
CO2 SERPL-SCNC: 28 MMOL/L (ref 21–32)
CREAT SERPL-MCNC: 0.78 MG/DL (ref 0.6–1.3)
EOSINOPHIL # BLD AUTO: 0.15 THOUSAND/ΜL (ref 0–0.61)
EOSINOPHIL NFR BLD AUTO: 4 % (ref 0–6)
ERYTHROCYTE [DISTWIDTH] IN BLOOD BY AUTOMATED COUNT: 12.9 % (ref 11.6–15.1)
GFR SERPL CREATININE-BSD FRML MDRD: 87 ML/MIN/1.73SQ M
GGT SERPL-CCNC: 100 U/L (ref 5–85)
GLUCOSE SERPL-MCNC: 110 MG/DL (ref 65–140)
HCT VFR BLD AUTO: 36.8 % (ref 34.8–46.1)
HGB BLD-MCNC: 11.6 G/DL (ref 11.5–15.4)
IMM GRANULOCYTES # BLD AUTO: 0 THOUSAND/UL (ref 0–0.2)
IMM GRANULOCYTES NFR BLD AUTO: 0 % (ref 0–2)
INR PPP: 0.95 (ref 0.86–1.17)
LYMPHOCYTES # BLD AUTO: 1.16 THOUSANDS/ΜL (ref 0.6–4.47)
LYMPHOCYTES NFR BLD AUTO: 33 % (ref 14–44)
MCH RBC QN AUTO: 30 PG (ref 26.8–34.3)
MCHC RBC AUTO-ENTMCNC: 31.5 G/DL (ref 31.4–37.4)
MCV RBC AUTO: 95 FL (ref 82–98)
MONOCYTES # BLD AUTO: 0.27 THOUSAND/ΜL (ref 0.17–1.22)
MONOCYTES NFR BLD AUTO: 8 % (ref 4–12)
NEUTROPHILS # BLD AUTO: 1.89 THOUSANDS/ΜL (ref 1.85–7.62)
NEUTS SEG NFR BLD AUTO: 54 % (ref 43–75)
NRBC BLD AUTO-RTO: 0 /100 WBCS
PLATELET # BLD AUTO: 265 THOUSANDS/UL (ref 149–390)
PMV BLD AUTO: 10.3 FL (ref 8.9–12.7)
POTASSIUM SERPL-SCNC: 4 MMOL/L (ref 3.5–5.3)
PROT SERPL-MCNC: 7.4 G/DL (ref 6.4–8.2)
PROTHROMBIN TIME: 12.8 SECONDS (ref 11.8–14.2)
RBC # BLD AUTO: 3.87 MILLION/UL (ref 3.81–5.12)
SODIUM SERPL-SCNC: 138 MMOL/L (ref 136–145)
WBC # BLD AUTO: 3.51 THOUSAND/UL (ref 4.31–10.16)

## 2019-04-02 PROCEDURE — 99242 OFF/OP CONSLTJ NEW/EST SF 20: CPT | Performed by: PHYSICIAN ASSISTANT

## 2019-04-02 PROCEDURE — 36415 COLL VENOUS BLD VENIPUNCTURE: CPT

## 2019-04-02 PROCEDURE — 85610 PROTHROMBIN TIME: CPT

## 2019-04-02 PROCEDURE — 82977 ASSAY OF GGT: CPT

## 2019-04-02 PROCEDURE — 85730 THROMBOPLASTIN TIME PARTIAL: CPT

## 2019-04-02 PROCEDURE — 85025 COMPLETE CBC W/AUTO DIFF WBC: CPT

## 2019-04-02 PROCEDURE — 80053 COMPREHEN METABOLIC PANEL: CPT

## 2019-04-04 ENCOUNTER — TELEPHONE (OUTPATIENT)
Dept: INTERNAL MEDICINE CLINIC | Facility: CLINIC | Age: 54
End: 2019-04-04

## 2019-04-25 ENCOUNTER — OFFICE VISIT (OUTPATIENT)
Dept: INTERNAL MEDICINE CLINIC | Facility: CLINIC | Age: 54
End: 2019-04-25
Payer: COMMERCIAL

## 2019-04-25 VITALS
BODY MASS INDEX: 30.82 KG/M2 | HEIGHT: 60 IN | OXYGEN SATURATION: 98 % | SYSTOLIC BLOOD PRESSURE: 118 MMHG | WEIGHT: 157 LBS | TEMPERATURE: 98.3 F | HEART RATE: 89 BPM | DIASTOLIC BLOOD PRESSURE: 84 MMHG

## 2019-04-25 DIAGNOSIS — K58.2 IRRITABLE BOWEL SYNDROME WITH BOTH CONSTIPATION AND DIARRHEA: ICD-10-CM

## 2019-04-25 DIAGNOSIS — M48.07 SPINAL STENOSIS OF LUMBOSACRAL REGION: Primary | ICD-10-CM

## 2019-04-25 DIAGNOSIS — E78.00 HYPERCHOLESTEROLEMIA: ICD-10-CM

## 2019-04-25 DIAGNOSIS — Z48.02 VISIT FOR SUTURE REMOVAL: ICD-10-CM

## 2019-04-25 DIAGNOSIS — J45.20 MILD INTERMITTENT EXTRINSIC ASTHMA WITHOUT COMPLICATION: ICD-10-CM

## 2019-04-25 PROCEDURE — 99213 OFFICE O/P EST LOW 20 MIN: CPT | Performed by: PHYSICIAN ASSISTANT

## 2019-04-25 PROCEDURE — 3008F BODY MASS INDEX DOCD: CPT | Performed by: PHYSICIAN ASSISTANT

## 2019-04-25 RX ORDER — ACETAMINOPHEN 500 MG
1000 TABLET ORAL EVERY 8 HOURS
COMMUNITY
Start: 2019-04-13 | End: 2019-05-13

## 2019-05-20 ENCOUNTER — APPOINTMENT (OUTPATIENT)
Dept: LAB | Facility: HOSPITAL | Age: 54
End: 2019-05-20
Payer: COMMERCIAL

## 2019-05-20 DIAGNOSIS — E78.00 HYPERCHOLESTEROLEMIA: ICD-10-CM

## 2019-05-20 DIAGNOSIS — R10.31 RIGHT LOWER QUADRANT ABDOMINAL PAIN: ICD-10-CM

## 2019-05-20 DIAGNOSIS — R73.01 ELEVATED FASTING BLOOD SUGAR: ICD-10-CM

## 2019-05-20 LAB
ALBUMIN SERPL BCP-MCNC: 3.6 G/DL (ref 3.5–5)
ALP SERPL-CCNC: 108 U/L (ref 46–116)
ALT SERPL W P-5'-P-CCNC: 54 U/L (ref 12–78)
ANION GAP SERPL CALCULATED.3IONS-SCNC: 8 MMOL/L (ref 4–13)
AST SERPL W P-5'-P-CCNC: 27 U/L (ref 5–45)
BASOPHILS # BLD AUTO: 0.03 THOUSANDS/ΜL (ref 0–0.1)
BASOPHILS NFR BLD AUTO: 1 % (ref 0–1)
BILIRUB DIRECT SERPL-MCNC: 0.07 MG/DL (ref 0–0.2)
BILIRUB SERPL-MCNC: 0.2 MG/DL (ref 0.2–1)
BUN SERPL-MCNC: 12 MG/DL (ref 5–25)
CALCIUM SERPL-MCNC: 9.2 MG/DL (ref 8.3–10.1)
CHLORIDE SERPL-SCNC: 105 MMOL/L (ref 100–108)
CHOLEST SERPL-MCNC: 211 MG/DL (ref 50–200)
CO2 SERPL-SCNC: 28 MMOL/L (ref 21–32)
CREAT SERPL-MCNC: 0.7 MG/DL (ref 0.6–1.3)
EOSINOPHIL # BLD AUTO: 0.1 THOUSAND/ΜL (ref 0–0.61)
EOSINOPHIL NFR BLD AUTO: 3 % (ref 0–6)
ERYTHROCYTE [DISTWIDTH] IN BLOOD BY AUTOMATED COUNT: 12.8 % (ref 11.6–15.1)
GFR SERPL CREATININE-BSD FRML MDRD: 99 ML/MIN/1.73SQ M
GLUCOSE P FAST SERPL-MCNC: 101 MG/DL (ref 65–99)
HCT VFR BLD AUTO: 40 % (ref 34.8–46.1)
HDLC SERPL-MCNC: 49 MG/DL (ref 40–60)
HGB BLD-MCNC: 12.8 G/DL (ref 11.5–15.4)
IMM GRANULOCYTES # BLD AUTO: 0.02 THOUSAND/UL (ref 0–0.2)
IMM GRANULOCYTES NFR BLD AUTO: 1 % (ref 0–2)
LDLC SERPL CALC-MCNC: 126 MG/DL (ref 0–100)
LYMPHOCYTES # BLD AUTO: 1.51 THOUSANDS/ΜL (ref 0.6–4.47)
LYMPHOCYTES NFR BLD AUTO: 41 % (ref 14–44)
MCH RBC QN AUTO: 29.2 PG (ref 26.8–34.3)
MCHC RBC AUTO-ENTMCNC: 32 G/DL (ref 31.4–37.4)
MCV RBC AUTO: 91 FL (ref 82–98)
MONOCYTES # BLD AUTO: 0.26 THOUSAND/ΜL (ref 0.17–1.22)
MONOCYTES NFR BLD AUTO: 7 % (ref 4–12)
NEUTROPHILS # BLD AUTO: 1.79 THOUSANDS/ΜL (ref 1.85–7.62)
NEUTS SEG NFR BLD AUTO: 47 % (ref 43–75)
NRBC BLD AUTO-RTO: 0 /100 WBCS
PLATELET # BLD AUTO: 285 THOUSANDS/UL (ref 149–390)
PMV BLD AUTO: 10.4 FL (ref 8.9–12.7)
POTASSIUM SERPL-SCNC: 4 MMOL/L (ref 3.5–5.3)
PROT SERPL-MCNC: 7.8 G/DL (ref 6.4–8.2)
RBC # BLD AUTO: 4.38 MILLION/UL (ref 3.81–5.12)
SODIUM SERPL-SCNC: 141 MMOL/L (ref 136–145)
TRIGL SERPL-MCNC: 178 MG/DL
TSH SERPL DL<=0.05 MIU/L-ACNC: 2.94 UIU/ML (ref 0.36–3.74)
WBC # BLD AUTO: 3.71 THOUSAND/UL (ref 4.31–10.16)

## 2019-05-20 PROCEDURE — 82248 BILIRUBIN DIRECT: CPT

## 2019-05-20 PROCEDURE — 80061 LIPID PANEL: CPT

## 2019-05-20 PROCEDURE — 85025 COMPLETE CBC W/AUTO DIFF WBC: CPT

## 2019-05-20 PROCEDURE — 36415 COLL VENOUS BLD VENIPUNCTURE: CPT

## 2019-05-20 PROCEDURE — 80053 COMPREHEN METABOLIC PANEL: CPT

## 2019-05-20 PROCEDURE — 84443 ASSAY THYROID STIM HORMONE: CPT

## 2019-05-23 ENCOUNTER — OFFICE VISIT (OUTPATIENT)
Dept: INTERNAL MEDICINE CLINIC | Facility: CLINIC | Age: 54
End: 2019-05-23
Payer: COMMERCIAL

## 2019-05-23 VITALS
HEART RATE: 89 BPM | DIASTOLIC BLOOD PRESSURE: 80 MMHG | SYSTOLIC BLOOD PRESSURE: 130 MMHG | OXYGEN SATURATION: 99 % | WEIGHT: 161 LBS | BODY MASS INDEX: 31.44 KG/M2

## 2019-05-23 DIAGNOSIS — Z98.890 S/P LUMBAR SPINE OPERATION: Chronic | ICD-10-CM

## 2019-05-23 DIAGNOSIS — E66.9 OBESITY (BMI 30-39.9): ICD-10-CM

## 2019-05-23 DIAGNOSIS — K58.2 IRRITABLE BOWEL SYNDROME WITH BOTH CONSTIPATION AND DIARRHEA: Primary | Chronic | ICD-10-CM

## 2019-05-23 DIAGNOSIS — M48.07 SPINAL STENOSIS OF LUMBOSACRAL REGION: ICD-10-CM

## 2019-05-23 DIAGNOSIS — E78.2 MIXED HYPERLIPIDEMIA: Chronic | ICD-10-CM

## 2019-05-23 PROCEDURE — 1036F TOBACCO NON-USER: CPT | Performed by: INTERNAL MEDICINE

## 2019-05-23 PROCEDURE — 99214 OFFICE O/P EST MOD 30 MIN: CPT | Performed by: INTERNAL MEDICINE

## 2019-05-23 RX ORDER — ACETAMINOPHEN 325 MG/1
500 TABLET ORAL AS NEEDED
COMMUNITY

## 2019-06-11 ENCOUNTER — TRANSCRIBE ORDERS (OUTPATIENT)
Dept: ADMINISTRATIVE | Facility: HOSPITAL | Age: 54
End: 2019-06-11

## 2019-06-11 DIAGNOSIS — Z98.1 ARTHRODESIS STATUS: Primary | ICD-10-CM

## 2019-06-14 ENCOUNTER — HOSPITAL ENCOUNTER (OUTPATIENT)
Dept: ULTRASOUND IMAGING | Facility: CLINIC | Age: 54
Discharge: HOME | End: 2019-06-14
Payer: COMMERCIAL

## 2019-06-14 DIAGNOSIS — Z98.1 ARTHRODESIS STATUS: ICD-10-CM

## 2019-06-14 PROCEDURE — 93970 EXTREMITY STUDY: CPT

## 2019-06-15 PROCEDURE — 93970 EXTREMITY STUDY: CPT | Performed by: SURGERY

## 2019-08-13 ENCOUNTER — TRANSCRIBE ORDERS (OUTPATIENT)
Dept: ADMINISTRATIVE | Facility: HOSPITAL | Age: 54
End: 2019-08-13

## 2019-08-13 DIAGNOSIS — R20.2 NUMBNESS AND TINGLING OF BOTH FEET: Primary | ICD-10-CM

## 2019-08-13 DIAGNOSIS — R20.0 NUMBNESS AND TINGLING OF BOTH FEET: Primary | ICD-10-CM

## 2019-08-15 DIAGNOSIS — K58.1 IRRITABLE BOWEL SYNDROME WITH CONSTIPATION: Primary | Chronic | ICD-10-CM

## 2019-09-05 RX ORDER — MELOXICAM 15 MG/1
15 TABLET ORAL DAILY
Refills: 0 | COMMUNITY
Start: 2019-07-09 | End: 2020-11-30

## 2019-09-05 RX ORDER — MELOXICAM 15 MG/1
15 TABLET ORAL
COMMUNITY
Start: 2019-08-12 | End: 2019-12-10 | Stop reason: CLARIF

## 2019-09-06 ENCOUNTER — OFFICE VISIT (OUTPATIENT)
Dept: GASTROENTEROLOGY | Facility: CLINIC | Age: 54
End: 2019-09-06
Payer: COMMERCIAL

## 2019-09-06 VITALS
HEIGHT: 60 IN | HEART RATE: 91 BPM | SYSTOLIC BLOOD PRESSURE: 120 MMHG | BODY MASS INDEX: 32.63 KG/M2 | DIASTOLIC BLOOD PRESSURE: 82 MMHG | WEIGHT: 166.2 LBS

## 2019-09-06 DIAGNOSIS — K59.04 CHRONIC IDIOPATHIC CONSTIPATION: Primary | ICD-10-CM

## 2019-09-06 PROCEDURE — 99203 OFFICE O/P NEW LOW 30 MIN: CPT | Performed by: PHYSICIAN ASSISTANT

## 2019-09-06 NOTE — PROGRESS NOTES
[facility] - Outpatient Consultation  Kirby Narayanan 47 y o  female MRN: [id number]  Encounter: [id number]          ASSESSMENT AND PLAN:      1  Chronic idiopathic constipation  She is doing very well on Linzess 145mcg daily  Her last colonoscopy was in 2014 and was reported as normal    Will continue medication as prescribed  F/U in 1 year or sooner if necessary  ______________________________________________________________________    HPI:  79-year-old female with chronic constipation presents for routine follow-up  She continues on Linzess 145 mcg daily with good relief  She admits that recently she had a back surgery and had to take narcotic pain medication and so she did have to add MiraLax however she is working to get off of this  She denies any abdominal pain or rectal bleeding  Her last colonoscopy was in 2014 was reported as normal   She will be due routinely in 2024  REVIEW OF SYSTEMS:    CONSTITUTIONAL: Denies any fever, chills, rigors, and weight loss  HEENT: No earache or tinnitus  Denies hearing loss or visual disturbances  CARDIOVASCULAR: No chest pain or palpitations  RESPIRATORY: Denies any cough, hemoptysis, shortness of breath or dyspnea on exertion  GASTROINTESTINAL: As noted in the History of Present Illness  GENITOURINARY: No problems with urination  Denies any hematuria or dysuria  NEUROLOGIC: No dizziness or vertigo, denies headaches  MUSCULOSKELETAL: Denies any muscle or joint pain  SKIN: Denies skin rashes or itching  ENDOCRINE: Denies excessive thirst  Denies intolerance to heat or cold  PSYCHOSOCIAL: Denies depression or anxiety  Denies any recent memory loss         Historical Information   Past Medical History:   Diagnosis Date    Abdominal pain     Arthralgia of pelvic region and thigh     Chest pain     CTS (carpal tunnel syndrome)     Fibromyalgia     Hepatitis     Leukocytopenia     Leukorrhea     Temporomandibular joint sounds on opening and/or closing the jaw      Past Surgical History:   Procedure Laterality Date    BACK SURGERY      CHOLECYSTECTOMY      FOOT SURGERY Right     INCONTINENCE SURGERY      vaginal sling for stress incontinence    [facility]    SINUS SURGERY      SPINE SURGERY  03/05/2019     Social History   Social History     Substance and Sexual Activity   Alcohol Use No     Social History     Substance and Sexual Activity   Drug Use No     Social History     Tobacco Use   Smoking Status Never Smoker   Smokeless Tobacco Never Used     Family History   Problem Relation Age of Onset    Alzheimer's disease Father     Asthma Father     Heart attack Father     Alzheimer's disease Maternal Grandmother     Lymphoma Maternal Aunt     Emphysema Maternal Grandfather        Meds/Allergies       Current Outpatient Medications:     acetaminophen (TYLENOL) 325 mg tablet    gabapentin (NEURONTIN) 600 MG tablet    linaCLOtide (LINZESS) 145 MCG CAPS    meloxicam (MOBIC) 15 mg tablet    mometasone (NASONEX) 50 mcg/act nasal spray    meloxicam (MOBIC) 15 mg tablet    oxyCODONE-acetaminophen (PERCOCET) 5-325 mg per tablet    Allergies   Allergen Reactions    Other Sneezing and Nasal Congestion     SEASONAL ALLERGIES           Objective     Blood pressure 120/82, pulse 91, height 5' (1 524 m), weight 75 4 kg (166 lb 3 2 oz)  Body mass index is 32 46 kg/m²  PHYSICAL EXAM:      General Appearance:   Alert, cooperative, no distress   HEENT:   Normocephalic, atraumatic, anicteric      Neck:  Supple, symmetrical, trachea midline   Lungs:   Clear to auscultation bilaterally; no rales, rhonchi or wheezing; respirations unlabored    Heart[de-identified]   Regular rate and rhythm; no murmur, rub, or gallop     Abdomen:   Soft, non-tender, non-distended; normal bowel sounds; no masses, no organomegaly    Genitalia:   Deferred    Rectal:   Deferred    Extremities:  No cyanosis, clubbing or edema  Pulses:  2+ and symmetric    Skin:  No jaundice, rashes, or lesions    Lymph nodes:  No palpable cervical lymphadenopathy        Lab Results:   No visits with results within 1 Day(s) from this visit  Latest known visit with results is:   Appointment on 05/20/2019   Component Date Value    Sodium 05/20/2019 141     Potassium 05/20/2019 4 0     Chloride 05/20/2019 105     CO2 05/20/2019 28     ANION GAP 05/20/2019 8     BUN 05/20/2019 12     Creatinine 05/20/2019 0 70     Glucose, Fasting 05/20/2019 101*    Calcium 05/20/2019 9 2     AST 05/20/2019 27     ALT 05/20/2019 54     Alkaline Phosphatase 05/20/2019 108     Total Protein 05/20/2019 7 8     Albumin 05/20/2019 3 6     Total Bilirubin 05/20/2019 0 20     eGFR 05/20/2019 99     WBC 05/20/2019 3 71*    RBC 05/20/2019 4 38     Hemoglobin 05/20/2019 12 8     Hematocrit 05/20/2019 40 0     MCV 05/20/2019 91     MCH 05/20/2019 29 2     MCHC 05/20/2019 32 0     RDW 05/20/2019 12 8     MPV 05/20/2019 10 4     Platelets 79/26/5271 285     nRBC 05/20/2019 0     Neutrophils Relative 05/20/2019 47     Immat GRANS % 05/20/2019 1     Lymphocytes Relative 05/20/2019 41     Monocytes Relative 05/20/2019 7     Eosinophils Relative 05/20/2019 3     Basophils Relative 05/20/2019 1     Neutrophils Absolute 05/20/2019 1 79*    Immature Grans Absolute 05/20/2019 0 02     Lymphocytes Absolute 05/20/2019 1 51     Monocytes Absolute 05/20/2019 0 26     Eosinophils Absolute 05/20/2019 0 10     Basophils Absolute 05/20/2019 0 03     TSH 3RD GENERATON 05/20/2019 2 938     Cholesterol 05/20/2019 211*    Triglycerides 05/20/2019 178*    HDL, Direct 05/20/2019 49     LDL Calculated 05/20/2019 126*    Bilirubin, Direct 05/20/2019 0 07          Radiology Results:   No results found

## 2019-10-18 ENCOUNTER — TRANSCRIBE ORDERS (OUTPATIENT)
Dept: ADMINISTRATIVE | Facility: HOSPITAL | Age: 54
End: 2019-10-18

## 2019-10-18 DIAGNOSIS — Z98.1 S/P SPINAL FUSION: ICD-10-CM

## 2019-10-18 DIAGNOSIS — M43.16 SPONDYLOLISTHESIS OF LUMBAR REGION: Primary | ICD-10-CM

## 2019-11-30 ENCOUNTER — APPOINTMENT (OUTPATIENT)
Dept: LAB | Facility: HOSPITAL | Age: 54
End: 2019-11-30
Attending: INTERNAL MEDICINE
Payer: COMMERCIAL

## 2019-11-30 DIAGNOSIS — E78.2 MIXED HYPERLIPIDEMIA: Chronic | ICD-10-CM

## 2019-11-30 LAB
ANION GAP SERPL CALCULATED.3IONS-SCNC: 10 MMOL/L (ref 4–13)
BUN SERPL-MCNC: 11 MG/DL (ref 5–25)
CALCIUM SERPL-MCNC: 9.1 MG/DL (ref 8.3–10.1)
CHLORIDE SERPL-SCNC: 105 MMOL/L (ref 100–108)
CHOLEST SERPL-MCNC: 194 MG/DL (ref 50–200)
CO2 SERPL-SCNC: 26 MMOL/L (ref 21–32)
CREAT SERPL-MCNC: 0.78 MG/DL (ref 0.6–1.3)
GFR SERPL CREATININE-BSD FRML MDRD: 86 ML/MIN/1.73SQ M
GLUCOSE P FAST SERPL-MCNC: 106 MG/DL (ref 65–99)
HDLC SERPL-MCNC: 54 MG/DL
LDLC SERPL CALC-MCNC: 115 MG/DL (ref 0–100)
NONHDLC SERPL-MCNC: 140 MG/DL
POTASSIUM SERPL-SCNC: 4.1 MMOL/L (ref 3.5–5.3)
SODIUM SERPL-SCNC: 141 MMOL/L (ref 136–145)
TRIGL SERPL-MCNC: 124 MG/DL

## 2019-11-30 PROCEDURE — 80061 LIPID PANEL: CPT

## 2019-11-30 PROCEDURE — 80048 BASIC METABOLIC PNL TOTAL CA: CPT

## 2019-11-30 PROCEDURE — 36415 COLL VENOUS BLD VENIPUNCTURE: CPT

## 2019-12-10 ENCOUNTER — OFFICE VISIT (OUTPATIENT)
Dept: INTERNAL MEDICINE CLINIC | Facility: CLINIC | Age: 54
End: 2019-12-10
Payer: COMMERCIAL

## 2019-12-10 VITALS
HEIGHT: 60 IN | DIASTOLIC BLOOD PRESSURE: 82 MMHG | SYSTOLIC BLOOD PRESSURE: 124 MMHG | RESPIRATION RATE: 12 BRPM | BODY MASS INDEX: 31.18 KG/M2 | WEIGHT: 158.8 LBS | HEART RATE: 88 BPM

## 2019-12-10 DIAGNOSIS — K59.04 CHRONIC IDIOPATHIC CONSTIPATION: ICD-10-CM

## 2019-12-10 DIAGNOSIS — Z98.890 S/P LUMBAR SPINE OPERATION: Chronic | ICD-10-CM

## 2019-12-10 DIAGNOSIS — R09.82 POST-NASAL DRIP: ICD-10-CM

## 2019-12-10 DIAGNOSIS — Z11.4 SCREENING FOR HIV (HUMAN IMMUNODEFICIENCY VIRUS): ICD-10-CM

## 2019-12-10 DIAGNOSIS — E78.2 MIXED HYPERLIPIDEMIA: Primary | Chronic | ICD-10-CM

## 2019-12-10 DIAGNOSIS — R73.01 IMPAIRED FASTING GLUCOSE: ICD-10-CM

## 2019-12-10 PROBLEM — D25.1 INTRAMURAL LEIOMYOMA OF UTERUS: Status: ACTIVE | Noted: 2019-10-09

## 2019-12-10 PROCEDURE — 99214 OFFICE O/P EST MOD 30 MIN: CPT | Performed by: INTERNAL MEDICINE

## 2019-12-10 PROCEDURE — 3008F BODY MASS INDEX DOCD: CPT | Performed by: INTERNAL MEDICINE

## 2019-12-10 PROCEDURE — 1036F TOBACCO NON-USER: CPT | Performed by: INTERNAL MEDICINE

## 2019-12-10 RX ORDER — MOMETASONE FUROATE 50 UG/1
2 SPRAY, METERED NASAL DAILY
Qty: 51 G | Refills: 3 | Status: SHIPPED | OUTPATIENT
Start: 2019-12-10 | End: 2021-08-27 | Stop reason: SDUPTHER

## 2019-12-10 RX ORDER — DIPHENOXYLATE HYDROCHLORIDE AND ATROPINE SULFATE 2.5; .025 MG/1; MG/1
1 TABLET ORAL DAILY
COMMUNITY

## 2019-12-10 NOTE — ASSESSMENT & PLAN NOTE
Patient's cholesterol has improved with diet and lifestyle changes  Her 10-year ASCVD risk is 1 7% which puts her at low risk  Will continue to monitor

## 2019-12-10 NOTE — PROGRESS NOTES
INTERNAL MEDICINE FOLLOW-UP OFFICE VISIT  [facility]    NAME: Odilia Sicard  AGE: 47 y o  SEX: female  : 1965     DATE: 12/10/2019     Assessment and Plan:     1  Mixed hyperlipidemia  Assessment & Plan:  Patient's cholesterol has improved with diet and lifestyle changes  Her 10-year ASCVD risk is 1 7% which puts her at low risk  Will continue to monitor  Orders:  -     Lipid panel; Future; Expected date: 06/10/2020  -     Comprehensive metabolic panel; Future; Expected date: 06/10/2020  -     CBC; Future; Expected date: 06/10/2020    2  Impaired fasting glucose  Assessment & Plan:  Glucose slightly elevated at 106  She has been drinking a lot of cranberry juice  Discussed appropriate serving size and to water it down a little  Will check A1c before next visit  Last A1c was 5 4%  Orders:  -     CBC; Future; Expected date: 06/10/2020  -     Hemoglobin A1C; Future; Expected date: 06/10/2020    3  Chronic idiopathic constipation  Assessment & Plan:  Doing well on linzess  Will continue  She is UTD with colonoscopy  Follows with GI       4  S/P lumbar spine operation  Assessment & Plan: This is stable after surgery  She should continue gabapentin and meloxicam prn  Follow-up with orthopedics  5  Screening for HIV (human immunodeficiency virus)  Comments:  Discussed with patient that HIV screening is recommended    Orders:  -     HIV 1/2 AG-AB combo; Future; Expected date: 06/10/2020    6  Post-nasal drip  -     mometasone (NASONEX) 50 mcg/act nasal spray; 2 sprays into each nostril daily      Return in about 6 months (around 6/10/2020) for Annual Physical      Chief Complaint:     Chief Complaint   Patient presents with    Annual Exam     labs         History of Present Illness:     Patient presents for routine follow-up  Hyperlipidemia  Patient with history of hyperlipidemia   When she was last seen in May, her total cholesterol was 211, triglycerides 178, hdl 49, and ldl 126  Repeat labs from 11/30/2019 show improvement with total cholesterol 194, triglycerides 124, hdl 54, and ldl 115  She has been trying to watch what she eats  10-year ASCVD risk is 1 7%  She has been watching her diet  Impaired fasting glucose  On most recent basic metabolic panel's, her blood sugar in AM was slightly elevated at 101 and 106  Last A1c in 2018 was normal at 5 4%    Chronic idiopathic constipation  She saw GI for follow-up back in September 2019  She is doing well on linzess  She is UTD with colonoscopy and was recommended to follow-up with them in 1 year    S/p lumbar spine operation  Patient is s/p posterior lumbar decompression L5-S1 on 4/11/2019 and s/p anterior posterior lumbar interbody fusion L5-S1 on 3/5/2019  She notes improving symptoms in regards to numbness/tingling and intermittent shooting pains left lower extremity  She has stable x-rays of her lumbar spine  She was told to follow-up with orthopedics in 4 months and also recommended to consider pain management  Review of Systems:     Review of Systems   Constitutional: Negative for activity change, appetite change and fatigue  Respiratory: Negative for apnea, cough, chest tightness, shortness of breath and wheezing  Cardiovascular: Negative for chest pain, palpitations and leg swelling  Gastrointestinal: Negative for abdominal distention, abdominal pain, blood in stool, constipation, diarrhea, nausea and vomiting  Musculoskeletal: Positive for back pain  Negative for arthralgias, gait problem, joint swelling and myalgias  Skin: Negative for rash and wound  Neurological: Positive for numbness  Negative for dizziness, weakness, light-headedness and headaches  Psychiatric/Behavioral: Negative for behavioral problems, confusion, hallucinations, sleep disturbance and suicidal ideas  The patient is not nervous/anxious         Problem List:     Patient Active Problem List   Diagnosis  Extrinsic asthma    Spinal stenosis of lumbosacral region    Irritable bowel syndrome    Mixed hyperlipidemia    HNP (herniated nucleus pulposus), lumbar    S/P lumbar spine operation    Obesity (BMI 30-39  9)    Intramural leiomyoma of uterus    Chronic idiopathic constipation      Objective:     /82 (BP Location: Left arm, Patient Position: Sitting)   Pulse 88   Resp 12   Ht 5' (1 524 m)   Wt 72 kg (158 lb 12 8 oz)   BMI 31 01 kg/m²     Physical Exam   Constitutional: She is oriented to person, place, and time  She appears well-developed and well-nourished  No distress  Eyes: Conjunctivae are normal  Right eye exhibits no discharge  Left eye exhibits no discharge  No scleral icterus  Neck: Neck supple  No JVD present  No thyromegaly present  Cardiovascular: Normal rate, regular rhythm and normal heart sounds  No murmur heard  Pulmonary/Chest: Effort normal and breath sounds normal  No respiratory distress  She has no wheezes  She has no rales  She exhibits no tenderness  Abdominal: Soft  Bowel sounds are normal  She exhibits no distension  There is no tenderness  Musculoskeletal: She exhibits no edema  Lymphadenopathy:     She has no cervical adenopathy  Neurological: She is alert and oriented to person, place, and time  She has normal strength  Skin: Skin is warm and dry  She is not diaphoretic  Psychiatric: She has a normal mood and affect  Her behavior is normal    Vitals reviewed      Dona Mendez DO  [facility]

## 2019-12-10 NOTE — ASSESSMENT & PLAN NOTE
Glucose slightly elevated at 106  She has been drinking a lot of cranberry juice  Discussed appropriate serving size and to water it down a little  Will check A1c before next visit  Last A1c was 5 4%

## 2019-12-10 NOTE — PATIENT INSTRUCTIONS

## 2019-12-10 NOTE — ASSESSMENT & PLAN NOTE
This is stable after surgery  She should continue gabapentin and meloxicam prn  Follow-up with orthopedics

## 2020-02-04 DIAGNOSIS — K58.1 IRRITABLE BOWEL SYNDROME WITH CONSTIPATION: Chronic | ICD-10-CM

## 2020-02-04 RX ORDER — LINACLOTIDE 145 UG/1
CAPSULE, GELATIN COATED ORAL
Qty: 90 CAPSULE | Refills: 0 | Status: SHIPPED | OUTPATIENT
Start: 2020-02-04 | End: 2020-04-21

## 2020-02-27 ENCOUNTER — TRANSCRIBE ORDERS (OUTPATIENT)
Dept: ADMINISTRATIVE | Facility: HOSPITAL | Age: 55
End: 2020-02-27

## 2020-02-27 DIAGNOSIS — Z98.1 S/P SPINAL FUSION: Primary | ICD-10-CM

## 2020-03-06 ENCOUNTER — HOSPITAL ENCOUNTER (OUTPATIENT)
Dept: CT IMAGING | Facility: CLINIC | Age: 55
Discharge: HOME | End: 2020-03-06
Payer: COMMERCIAL

## 2020-03-06 DIAGNOSIS — Z98.1 S/P SPINAL FUSION: ICD-10-CM

## 2020-03-06 PROCEDURE — 72131 CT LUMBAR SPINE W/O DYE: CPT

## 2020-04-21 DIAGNOSIS — K58.1 IRRITABLE BOWEL SYNDROME WITH CONSTIPATION: Chronic | ICD-10-CM

## 2020-04-21 RX ORDER — LINACLOTIDE 145 UG/1
CAPSULE, GELATIN COATED ORAL
Qty: 90 CAPSULE | Refills: 3 | Status: SHIPPED | OUTPATIENT
Start: 2020-04-21 | End: 2021-05-18

## 2020-08-21 ENCOUNTER — APPOINTMENT (OUTPATIENT)
Dept: LAB | Facility: HOSPITAL | Age: 55
End: 2020-08-21
Attending: INTERNAL MEDICINE
Payer: COMMERCIAL

## 2020-08-21 DIAGNOSIS — Z11.4 SCREENING FOR HIV (HUMAN IMMUNODEFICIENCY VIRUS): ICD-10-CM

## 2020-08-21 DIAGNOSIS — E78.2 MIXED HYPERLIPIDEMIA: Chronic | ICD-10-CM

## 2020-08-21 DIAGNOSIS — R73.01 IMPAIRED FASTING GLUCOSE: ICD-10-CM

## 2020-08-21 LAB
ALBUMIN SERPL BCP-MCNC: 3.8 G/DL (ref 3.5–5)
ALP SERPL-CCNC: 110 U/L (ref 46–116)
ALT SERPL W P-5'-P-CCNC: 46 U/L (ref 12–78)
ANION GAP SERPL CALCULATED.3IONS-SCNC: 9 MMOL/L (ref 4–13)
AST SERPL W P-5'-P-CCNC: 26 U/L (ref 5–45)
BILIRUB SERPL-MCNC: 0.5 MG/DL (ref 0.2–1)
BUN SERPL-MCNC: 9 MG/DL (ref 5–25)
CALCIUM SERPL-MCNC: 9.1 MG/DL (ref 8.3–10.1)
CHLORIDE SERPL-SCNC: 107 MMOL/L (ref 100–108)
CHOLEST SERPL-MCNC: 211 MG/DL (ref 50–200)
CO2 SERPL-SCNC: 26 MMOL/L (ref 21–32)
CREAT SERPL-MCNC: 0.79 MG/DL (ref 0.6–1.3)
ERYTHROCYTE [DISTWIDTH] IN BLOOD BY AUTOMATED COUNT: 12.6 % (ref 11.6–15.1)
EST. AVERAGE GLUCOSE BLD GHB EST-MCNC: 108 MG/DL
GFR SERPL CREATININE-BSD FRML MDRD: 85 ML/MIN/1.73SQ M
GLUCOSE P FAST SERPL-MCNC: 104 MG/DL (ref 65–99)
HBA1C MFR BLD: 5.4 %
HCT VFR BLD AUTO: 38.7 % (ref 34.8–46.1)
HDLC SERPL-MCNC: 58 MG/DL
HGB BLD-MCNC: 12.8 G/DL (ref 11.5–15.4)
LDLC SERPL CALC-MCNC: 132 MG/DL (ref 0–100)
MCH RBC QN AUTO: 30.1 PG (ref 26.8–34.3)
MCHC RBC AUTO-ENTMCNC: 33.1 G/DL (ref 31.4–37.4)
MCV RBC AUTO: 91 FL (ref 82–98)
NONHDLC SERPL-MCNC: 153 MG/DL
PLATELET # BLD AUTO: 286 THOUSANDS/UL (ref 149–390)
PMV BLD AUTO: 10.7 FL (ref 8.9–12.7)
POTASSIUM SERPL-SCNC: 3.8 MMOL/L (ref 3.5–5.3)
PROT SERPL-MCNC: 7.8 G/DL (ref 6.4–8.2)
RBC # BLD AUTO: 4.25 MILLION/UL (ref 3.81–5.12)
SODIUM SERPL-SCNC: 142 MMOL/L (ref 136–145)
TRIGL SERPL-MCNC: 106 MG/DL
WBC # BLD AUTO: 4.9 THOUSAND/UL (ref 4.31–10.16)

## 2020-08-21 PROCEDURE — 80061 LIPID PANEL: CPT

## 2020-08-21 PROCEDURE — 80053 COMPREHEN METABOLIC PANEL: CPT

## 2020-08-21 PROCEDURE — 85027 COMPLETE CBC AUTOMATED: CPT

## 2020-08-21 PROCEDURE — 36415 COLL VENOUS BLD VENIPUNCTURE: CPT

## 2020-08-21 PROCEDURE — 87389 HIV-1 AG W/HIV-1&-2 AB AG IA: CPT

## 2020-08-21 PROCEDURE — 83036 HEMOGLOBIN GLYCOSYLATED A1C: CPT

## 2020-08-23 LAB — HIV 1+2 AB+HIV1 P24 AG SERPL QL IA: NORMAL

## 2020-08-25 ENCOUNTER — TELEPHONE (OUTPATIENT)
Dept: INTERNAL MEDICINE CLINIC | Facility: CLINIC | Age: 55
End: 2020-08-25

## 2020-08-25 ENCOUNTER — TELEPHONE (OUTPATIENT)
Dept: ADMINISTRATIVE | Facility: OTHER | Age: 55
End: 2020-08-25

## 2020-08-25 NOTE — LETTER
Procedure Request Form: Mammogram      Date Requested: 20  Patient: Maribell Medrano  Patient : 1965   Referring Provider: Kristal Ha, DO        Date of Procedure ______________________________       The above patient has informed us that they have completed their   most recent Mammogram at your facility  Please complete   this form and attach all corresponding procedure reports/results  Comments __________________________________________________________  ____________________________________________________________________  ____________________________________________________________________  ____________________________________________________________________    Facility Completing Procedure _________________________________________    Form Completed By (print name) _______________________________________      Signature __________________________________________________________      These reports are needed for  compliance    Please fax this completed form and a copy of the procedure report to our office located at [place] [address] as soon as possible to [phone] attention Rachel: Phone [phone]    We thank you for your assistance in treating our mutual patient

## 2020-08-25 NOTE — TELEPHONE ENCOUNTER
----- Message from Fabián Perales sent at 8/25/2020  9:40 AM EDT -----  Regarding: MAMMOGRAM [facility] INTERNAL MED  08/25/20 9:40 AM    Hello, our patient Az Montoya has had Mammogram completed/performed   Please assist in updating the patient chart by making an External outreach to GENERAL [facility] located in [facility] [address] date of service is per pt states this was last year P#;[phone]      Thank you,  TRISTON Perales 64

## 2020-08-26 ENCOUNTER — OFFICE VISIT (OUTPATIENT)
Dept: INTERNAL MEDICINE CLINIC | Facility: CLINIC | Age: 55
End: 2020-08-26
Payer: COMMERCIAL

## 2020-08-26 VITALS
DIASTOLIC BLOOD PRESSURE: 80 MMHG | TEMPERATURE: 97.4 F | BODY MASS INDEX: 30.31 KG/M2 | SYSTOLIC BLOOD PRESSURE: 126 MMHG | WEIGHT: 154.4 LBS | HEART RATE: 80 BPM | RESPIRATION RATE: 14 BRPM | HEIGHT: 60 IN

## 2020-08-26 DIAGNOSIS — Z23 ENCOUNTER FOR IMMUNIZATION: ICD-10-CM

## 2020-08-26 DIAGNOSIS — M72.2 PLANTAR FASCIITIS OF RIGHT FOOT: ICD-10-CM

## 2020-08-26 DIAGNOSIS — J45.20 MILD INTERMITTENT EXTRINSIC ASTHMA WITHOUT COMPLICATION: Chronic | ICD-10-CM

## 2020-08-26 DIAGNOSIS — Z00.00 ADULT GENERAL MEDICAL EXAMINATION: Primary | ICD-10-CM

## 2020-08-26 PROCEDURE — 90715 TDAP VACCINE 7 YRS/> IM: CPT | Performed by: INTERNAL MEDICINE

## 2020-08-26 PROCEDURE — 90732 PPSV23 VACC 2 YRS+ SUBQ/IM: CPT | Performed by: INTERNAL MEDICINE

## 2020-08-26 PROCEDURE — 99396 PREV VISIT EST AGE 40-64: CPT | Performed by: INTERNAL MEDICINE

## 2020-08-26 PROCEDURE — 3725F SCREEN DEPRESSION PERFORMED: CPT | Performed by: INTERNAL MEDICINE

## 2020-08-26 PROCEDURE — 3008F BODY MASS INDEX DOCD: CPT | Performed by: INTERNAL MEDICINE

## 2020-08-26 PROCEDURE — 90472 IMMUNIZATION ADMIN EACH ADD: CPT | Performed by: INTERNAL MEDICINE

## 2020-08-26 PROCEDURE — 1036F TOBACCO NON-USER: CPT | Performed by: INTERNAL MEDICINE

## 2020-08-26 PROCEDURE — 90471 IMMUNIZATION ADMIN: CPT | Performed by: INTERNAL MEDICINE

## 2020-08-26 NOTE — PROGRESS NOTES
ADULT ANNUAL PHYSICAL   [facility]    NAME: Oleg Valdez  AGE: 54 y o  SEX: female  : 1965     DATE: 2020     Assessment and Plan:     Problem List Items Addressed This Visit        Respiratory    Extrinsic asthma (Chronic)      Other Visit Diagnoses     Adult general medical examination    -  Primary    Encounter for immunization        Relevant Orders    TDAP VACCINE GREATER THAN OR EQUAL TO 8YO IM (Completed)    PNEUMOCOCCAL POLYSACCHARIDE VACCINE 23-VALENT =>1YO SQ IM (Completed)    Plantar fasciitis of right foot        Relevant Orders    Ambulatory referral to Orthopedic Surgery        Immunizations and preventive care screenings were discussed with patient today  Appropriate education was printed on patient's after visit summary  Counseling:  Alcohol/drug use: discussed moderation in alcohol intake, the recommendations for healthy alcohol use, and avoidance of illicit drug use  Dental Health: discussed importance of regular tooth brushing, flossing, and dental visits  Injury prevention: discussed safety/seat belts, safety helmets, smoke detectors, carbon dioxide detectors, and smoking near bedding or upholstery  · Sexual health: discussed sexually transmitted diseases, partner selection, use of condoms, avoidance of unintended pregnancy, and contraceptive alternatives  BMI Counseling: Body mass index is 30 15 kg/m²  The BMI is above normal  Nutrition recommendations include decreasing portion sizes, encouraging healthy choices of fruits and vegetables, limiting drinks that contain sugar, moderation in carbohydrate intake and increasing intake of lean protein  Exercise recommendations include exercising 3-5 times per week           Return in about 1 year (around 2021) for Annual Physical      Chief Complaint:     Chief Complaint   Patient presents with    Annual Exam      History of Present Illness:     Adult Annual Physical   Patient here for a comprehensive physical exam  The patient reports some right foot/ankle problems recently  Was seeing orthopedics in [place] but with COVID-19 pandemic, she is considering not going back to [place]  Has been treated for plantar fasciitis  Had steroid injection in past     Labs done before appt show no evidence of prediabetes even though fasting glucose remains slightly elevated  Most recent A1c was 5 4 % on 8/21/2020  Cholesterol is a little high  She is trying to stay active with new pitbull puppy  Diet and Physical Activity  · Diet/Nutrition: well balanced diet  Depression Screening  PHQ-9 Depression Screening    PHQ-9:    Frequency of the following problems over the past two weeks:       Little interest or pleasure in doing things:  0 - not at all  Feeling down, depressed, or hopeless:  0 - not at all  PHQ-2 Score:  0       General Health  · Sleep: sleeps well  · Hearing: normal - bilateral   · Vision: no vision problems  · Dental: regular dental visits  Review of Systems:     Review of Systems   Constitutional: Negative for appetite change, chills, fatigue and fever  HENT: Negative for congestion, hearing loss, postnasal drip, rhinorrhea, sore throat, tinnitus and trouble swallowing  Eyes: Negative for pain, discharge, redness and visual disturbance  Respiratory: Negative for cough, chest tightness, shortness of breath and wheezing  Cardiovascular: Negative for chest pain, palpitations and leg swelling  Gastrointestinal: Negative for abdominal distention, abdominal pain, blood in stool, constipation, diarrhea, nausea and vomiting  Endocrine: Negative for cold intolerance, heat intolerance, polydipsia, polyphagia and polyuria  Genitourinary: Negative for difficulty urinating, dysuria, frequency, hematuria and urgency  Musculoskeletal: Positive for arthralgias  Negative for back pain, gait problem, joint swelling, myalgias, neck pain and neck stiffness  Skin: Negative for color change and rash  Neurological: Negative for dizziness, tremors, seizures, syncope, speech difficulty, weakness, light-headedness, numbness and headaches  Hematological: Negative for adenopathy  Does not bruise/bleed easily  Psychiatric/Behavioral: Negative for agitation, behavioral problems, confusion, hallucinations, sleep disturbance and suicidal ideas  The patient is not nervous/anxious         Past Medical History:     Past Medical History:   Diagnosis Date    Abdominal pain     Arthralgia of pelvic region and thigh     Chest pain     CTS (carpal tunnel syndrome)     Fibromyalgia     Hepatitis     Leukocytopenia     Leukorrhea     Temporomandibular joint sounds on opening and/or closing the jaw       Past Surgical History:     Past Surgical History:   Procedure Laterality Date    BACK SURGERY      CHOLECYSTECTOMY      FOOT SURGERY Right     INCONTINENCE SURGERY      vaginal sling for stress incontinence    [facility]    SINUS SURGERY      SPINE SURGERY  03/05/2019      Social History:     E-Cigarette/Vaping    E-Cigarette Use Never User      E-Cigarette/Vaping Substances    Nicotine No     THC No     CBD No     Flavoring No     Other No     Unknown No      Social History     Socioeconomic History    Marital status: /Civil Union     Spouse name: None    Number of children: None    Years of education: None    Highest education level: None   Occupational History    Occupation: homemaker   Social Needs    Financial resource strain: None    Food insecurity     Worry: None     Inability: None    Transportation needs     Medical: None     Non-medical: None   Tobacco Use    Smoking status: Never Smoker    Smokeless tobacco: Never Used   Substance and Sexual Activity    Alcohol use: No    Drug use: No    Sexual activity: Yes     Partners: Male     Birth control/protection: None   Lifestyle    Physical activity     Days per week: 2 days     Minutes per session: 40 min    Stress: Only a little   Relationships    Social connections     Talks on phone: None     Gets together: None     Attends Judaism service: None     Active member of club or organization: None     Attends meetings of clubs or organizations: None     Relationship status: None    Intimate partner violence     Fear of current or ex partner: None     Emotionally abused: None     Physically abused: None     Forced sexual activity: None   Other Topics Concern    None   Social History Narrative    None      Family History:     Family History   Problem Relation Age of Onset    Alzheimer's disease Father     Asthma Father     Heart attack Father     Alzheimer's disease Maternal Grandmother     Lymphoma Maternal Aunt     Emphysema Maternal Grandfather       Current Medications:     Current Outpatient Medications   Medication Sig Dispense Refill    acetaminophen (TYLENOL) 325 mg tablet Take 500 mg by mouth as needed for mild pain      gabapentin (NEURONTIN) 600 MG tablet Take 1 tablet by mouth Every 12 hours      LINZESS 145 MCG CAPS TAKE 1 CAPSULE DAILY 90 capsule 3    meloxicam (MOBIC) 15 mg tablet Take 15 mg by mouth daily  0    mometasone (NASONEX) 50 mcg/act nasal spray 2 sprays into each nostril daily 51 g 3    multivitamin (THERAGRAN) TABS Take 1 tablet by mouth daily       No current facility-administered medications for this visit  Allergies: Allergies   Allergen Reactions    Other Sneezing and Nasal Congestion     SEASONAL ALLERGIES      Physical Exam:     /80 (BP Location: Left arm, Patient Position: Sitting)   Pulse 80   Temp (!) 97 4 °F (36 3 °C) (Temporal)   Resp 14   Ht 5' (1 524 m)   Wt 70 kg (154 lb 6 4 oz)   BMI 30 15 kg/m²     Physical Exam  Vitals signs reviewed  Constitutional:       General: She is not in acute distress  Appearance: She is obese  She is not ill-appearing or toxic-appearing     HENT:      Head: Normocephalic and atraumatic  Right Ear: Tympanic membrane, ear canal and external ear normal  There is no impacted cerumen  Left Ear: Tympanic membrane, ear canal and external ear normal  There is no impacted cerumen  Nose: Nose normal  No congestion or rhinorrhea  Mouth/Throat:      Mouth: Mucous membranes are moist       Pharynx: No oropharyngeal exudate or posterior oropharyngeal erythema  Eyes:      General: No scleral icterus  Right eye: No discharge  Left eye: No discharge  Extraocular Movements: Extraocular movements intact  Conjunctiva/sclera: Conjunctivae normal       Pupils: Pupils are equal, round, and reactive to light  Neck:      Musculoskeletal: Neck supple  No muscular tenderness  Vascular: No carotid bruit  Cardiovascular:      Rate and Rhythm: Normal rate and regular rhythm  Heart sounds: No murmur  Pulmonary:      Effort: Pulmonary effort is normal  No respiratory distress  Breath sounds: Normal breath sounds  No wheezing or rales  Abdominal:      General: Abdomen is flat  Bowel sounds are normal  There is no distension  Palpations: Abdomen is soft  Tenderness: There is no abdominal tenderness  Hernia: No hernia is present  Musculoskeletal:         General: Tenderness (right achilles tendon) and deformity (osteoarthritis changes bilateral hands in PIPs and DIPs) present  Right lower leg: No edema  Left lower leg: No edema  Lymphadenopathy:      Cervical: No cervical adenopathy  Skin:     General: Skin is warm and dry  Findings: No rash  Neurological:      General: No focal deficit present  Mental Status: She is alert and oriented to person, place, and time  Cranial Nerves: No cranial nerve deficit  Sensory: No sensory deficit  Motor: No weakness        Coordination: Coordination normal       Gait: Gait normal    Psychiatric:         Mood and Affect: Mood normal  Behavior: Behavior normal         [facility],   MEDICAL [address]

## 2020-08-26 NOTE — PATIENT INSTRUCTIONS

## 2020-08-26 NOTE — TELEPHONE ENCOUNTER
Upon review of the In Basket request and the patient's chart, initial outreach has been made via fax, please see Contacts section for details  A second outreach attempt will be made within 5 business days      Thank you  Naomy Tabares MA

## 2020-08-31 NOTE — TELEPHONE ENCOUNTER
Upon review of the In Basket request we were able to locate, review, and update the patient chart as requested for Mammogram     Any additional questions or concerns should be emailed to the Practice Liaisons via [email or web address]  org email, please do not reply via In Basket      Thank you  Julisa Meredith MA

## 2020-11-30 ENCOUNTER — CONSULT (OUTPATIENT)
Dept: PAIN MEDICINE | Facility: CLINIC | Age: 55
End: 2020-11-30
Payer: COMMERCIAL

## 2020-11-30 ENCOUNTER — TELEPHONE (OUTPATIENT)
Dept: PAIN MEDICINE | Facility: CLINIC | Age: 55
End: 2020-11-30

## 2020-11-30 VITALS
RESPIRATION RATE: 18 BRPM | DIASTOLIC BLOOD PRESSURE: 81 MMHG | BODY MASS INDEX: 30.47 KG/M2 | WEIGHT: 155.2 LBS | SYSTOLIC BLOOD PRESSURE: 125 MMHG | HEIGHT: 60 IN | HEART RATE: 72 BPM

## 2020-11-30 DIAGNOSIS — Z98.890 S/P LUMBAR SPINE OPERATION: Chronic | ICD-10-CM

## 2020-11-30 DIAGNOSIS — R20.8 ALLODYNIA: ICD-10-CM

## 2020-11-30 DIAGNOSIS — G89.4 CHRONIC PAIN SYNDROME: Primary | ICD-10-CM

## 2020-11-30 PROCEDURE — 1036F TOBACCO NON-USER: CPT | Performed by: STUDENT IN AN ORGANIZED HEALTH CARE EDUCATION/TRAINING PROGRAM

## 2020-11-30 PROCEDURE — 3008F BODY MASS INDEX DOCD: CPT | Performed by: STUDENT IN AN ORGANIZED HEALTH CARE EDUCATION/TRAINING PROGRAM

## 2020-11-30 PROCEDURE — 99204 OFFICE O/P NEW MOD 45 MIN: CPT | Performed by: STUDENT IN AN ORGANIZED HEALTH CARE EDUCATION/TRAINING PROGRAM

## 2020-12-01 ENCOUNTER — PROCEDURE VISIT (OUTPATIENT)
Dept: NEUROLOGY | Facility: CLINIC | Age: 55
End: 2020-12-01
Payer: COMMERCIAL

## 2020-12-01 DIAGNOSIS — Z98.890 S/P LUMBAR SPINE OPERATION: Chronic | ICD-10-CM

## 2020-12-01 DIAGNOSIS — G89.4 CHRONIC PAIN SYNDROME: ICD-10-CM

## 2020-12-01 PROCEDURE — 95886 MUSC TEST DONE W/N TEST COMP: CPT | Performed by: PHYSICAL MEDICINE & REHABILITATION

## 2020-12-01 PROCEDURE — 95911 NRV CNDJ TEST 9-10 STUDIES: CPT | Performed by: PHYSICAL MEDICINE & REHABILITATION

## 2020-12-03 ENCOUNTER — TELEPHONE (OUTPATIENT)
Dept: RADIOLOGY | Facility: CLINIC | Age: 55
End: 2020-12-03

## 2021-01-12 DIAGNOSIS — M54.16 LUMBAR RADICULOPATHY: Primary | ICD-10-CM

## 2021-01-12 DIAGNOSIS — G89.4 CHRONIC PAIN SYNDROME: ICD-10-CM

## 2021-01-12 RX ORDER — GABAPENTIN 600 MG/1
600 TABLET ORAL 3 TIMES DAILY
Qty: 90 TABLET | Refills: 2 | Status: SHIPPED | OUTPATIENT
Start: 2021-01-12 | End: 2021-01-18 | Stop reason: SDUPTHER

## 2021-01-12 RX ORDER — GABAPENTIN 600 MG/1
600 TABLET ORAL 3 TIMES DAILY
Qty: 90 TABLET | Refills: 2 | Status: SHIPPED | OUTPATIENT
Start: 2021-01-12 | End: 2021-01-12 | Stop reason: SDUPTHER

## 2021-01-18 ENCOUNTER — TELEPHONE (OUTPATIENT)
Dept: PAIN MEDICINE | Facility: CLINIC | Age: 56
End: 2021-01-18

## 2021-01-18 DIAGNOSIS — M54.16 LUMBAR RADICULOPATHY: ICD-10-CM

## 2021-01-18 DIAGNOSIS — G89.4 CHRONIC PAIN SYNDROME: ICD-10-CM

## 2021-01-18 RX ORDER — GABAPENTIN 600 MG/1
600 TABLET ORAL 3 TIMES DAILY
Qty: 21 TABLET | Refills: 0 | Status: SHIPPED | OUTPATIENT
Start: 2021-01-18 | End: 2021-04-02 | Stop reason: SDUPTHER

## 2021-01-18 NOTE — TELEPHONE ENCOUNTER
S/w [facility]  They wanted a 90 day supply rather than 30 day with 2 refills  Gave OK to fill  Med will be mailed out today  Pt has 1 day supply remaining  [facility] you [facility] so pt is not without meds?

## 2021-01-18 NOTE — TELEPHONE ENCOUNTER
[facility] cannot release the gabapentin medication until they speak with the doctor  Can you send something local to pickup in the meantime? [place] [facility]  She only has 1 day left   Ph# [phone]

## 2021-01-19 ENCOUNTER — HOSPITAL ENCOUNTER (OUTPATIENT)
Dept: RADIOLOGY | Facility: HOSPITAL | Age: 56
Discharge: HOME | End: 2021-01-19
Payer: COMMERCIAL

## 2021-01-19 ENCOUNTER — TRANSCRIBE ORDERS (OUTPATIENT)
Dept: ADMINISTRATIVE | Facility: HOSPITAL | Age: 56
End: 2021-01-19

## 2021-01-19 ENCOUNTER — APPOINTMENT (OUTPATIENT)
Dept: LAB | Facility: HOSPITAL | Age: 56
End: 2021-01-19
Payer: COMMERCIAL

## 2021-01-19 DIAGNOSIS — M79.7 SCAPULOHUMERAL FIBROSITIS: ICD-10-CM

## 2021-01-19 DIAGNOSIS — M72.2 PLANTAR FASCIAL FIBROMATOSIS: ICD-10-CM

## 2021-01-19 DIAGNOSIS — M18.11 PRIMARY OSTEOARTHRITIS OF FIRST CARPOMETACARPAL JOINT OF RIGHT HAND: ICD-10-CM

## 2021-01-19 DIAGNOSIS — M47.816 LUMBAR SPONDYLOSIS: ICD-10-CM

## 2021-01-19 DIAGNOSIS — M72.2 PLANTAR FASCIAL FIBROMATOSIS: Primary | ICD-10-CM

## 2021-01-19 DIAGNOSIS — R53.83 OTHER FATIGUE: ICD-10-CM

## 2021-01-19 DIAGNOSIS — M47.816 LUMBAR SPONDYLOSIS: Primary | ICD-10-CM

## 2021-01-19 LAB
25(OH)D3 SERPL-MCNC: 35.6 NG/ML (ref 30–100)
ALBUMIN SERPL BCP-MCNC: 4.1 G/DL (ref 3.5–5)
ALP SERPL-CCNC: 112 U/L (ref 46–116)
ALT SERPL W P-5'-P-CCNC: 47 U/L (ref 12–78)
ANION GAP SERPL CALCULATED.3IONS-SCNC: 10 MMOL/L (ref 4–13)
AST SERPL W P-5'-P-CCNC: 29 U/L (ref 5–45)
BASOPHILS # BLD AUTO: 0.03 THOUSANDS/ΜL (ref 0–0.1)
BASOPHILS NFR BLD AUTO: 1 % (ref 0–1)
BILIRUB SERPL-MCNC: 0.5 MG/DL (ref 0.2–1)
BUN SERPL-MCNC: 11 MG/DL (ref 5–25)
CALCIUM SERPL-MCNC: 10.2 MG/DL (ref 8.3–10.1)
CHLORIDE SERPL-SCNC: 103 MMOL/L (ref 100–108)
CK MB SERPL-MCNC: 0.7 NG/ML (ref 0–5)
CK MB SERPL-MCNC: <1 % (ref 0–2.5)
CK SERPL-CCNC: 139 U/L (ref 26–192)
CO2 SERPL-SCNC: 29 MMOL/L (ref 21–32)
CREAT SERPL-MCNC: 0.89 MG/DL (ref 0.6–1.3)
CRP SERPL QL: <3 MG/L
EOSINOPHIL # BLD AUTO: 0.07 THOUSAND/ΜL (ref 0–0.61)
EOSINOPHIL NFR BLD AUTO: 2 % (ref 0–6)
ERYTHROCYTE [DISTWIDTH] IN BLOOD BY AUTOMATED COUNT: 12.4 % (ref 11.6–15.1)
ERYTHROCYTE [SEDIMENTATION RATE] IN BLOOD: 31 MM/HOUR (ref 0–29)
GFR SERPL CREATININE-BSD FRML MDRD: 73 ML/MIN/1.73SQ M
GLUCOSE P FAST SERPL-MCNC: 105 MG/DL (ref 65–99)
HCT VFR BLD AUTO: 41.5 % (ref 34.8–46.1)
HGB BLD-MCNC: 13.5 G/DL (ref 11.5–15.4)
IMM GRANULOCYTES # BLD AUTO: 0.01 THOUSAND/UL (ref 0–0.2)
IMM GRANULOCYTES NFR BLD AUTO: 0 % (ref 0–2)
LYMPHOCYTES # BLD AUTO: 1.89 THOUSANDS/ΜL (ref 0.6–4.47)
LYMPHOCYTES NFR BLD AUTO: 40 % (ref 14–44)
MCH RBC QN AUTO: 30.1 PG (ref 26.8–34.3)
MCHC RBC AUTO-ENTMCNC: 32.5 G/DL (ref 31.4–37.4)
MCV RBC AUTO: 92 FL (ref 82–98)
MONOCYTES # BLD AUTO: 0.29 THOUSAND/ΜL (ref 0.17–1.22)
MONOCYTES NFR BLD AUTO: 6 % (ref 4–12)
NEUTROPHILS # BLD AUTO: 2.43 THOUSANDS/ΜL (ref 1.85–7.62)
NEUTS SEG NFR BLD AUTO: 51 % (ref 43–75)
NRBC BLD AUTO-RTO: 0 /100 WBCS
PLATELET # BLD AUTO: 270 THOUSANDS/UL (ref 149–390)
PMV BLD AUTO: 10.4 FL (ref 8.9–12.7)
POTASSIUM SERPL-SCNC: 3.7 MMOL/L (ref 3.5–5.3)
PROT SERPL-MCNC: 8.7 G/DL (ref 6.4–8.2)
RBC # BLD AUTO: 4.49 MILLION/UL (ref 3.81–5.12)
RHEUMATOID FACT SER QL LA: NEGATIVE
SODIUM SERPL-SCNC: 142 MMOL/L (ref 136–145)
TSH SERPL DL<=0.05 MIU/L-ACNC: 3.42 UIU/ML (ref 0.36–3.74)
WBC # BLD AUTO: 4.72 THOUSAND/UL (ref 4.31–10.16)

## 2021-01-19 PROCEDURE — 86618 LYME DISEASE ANTIBODY: CPT

## 2021-01-19 PROCEDURE — 86800 THYROGLOBULIN ANTIBODY: CPT

## 2021-01-19 PROCEDURE — 86376 MICROSOMAL ANTIBODY EACH: CPT

## 2021-01-19 PROCEDURE — 82550 ASSAY OF CK (CPK): CPT

## 2021-01-19 PROCEDURE — 86430 RHEUMATOID FACTOR TEST QUAL: CPT

## 2021-01-19 PROCEDURE — 86665 EPSTEIN-BARR CAPSID VCA: CPT

## 2021-01-19 PROCEDURE — 80053 COMPREHEN METABOLIC PANEL: CPT

## 2021-01-19 PROCEDURE — 73130 X-RAY EXAM OF HAND: CPT

## 2021-01-19 PROCEDURE — 85025 COMPLETE CBC W/AUTO DIFF WBC: CPT

## 2021-01-19 PROCEDURE — 86200 CCP ANTIBODY: CPT

## 2021-01-19 PROCEDURE — 87086 URINE CULTURE/COLONY COUNT: CPT

## 2021-01-19 PROCEDURE — 84443 ASSAY THYROID STIM HORMONE: CPT

## 2021-01-19 PROCEDURE — 85652 RBC SED RATE AUTOMATED: CPT

## 2021-01-19 PROCEDURE — 82553 CREATINE MB FRACTION: CPT

## 2021-01-19 PROCEDURE — 86664 EPSTEIN-BARR NUCLEAR ANTIGEN: CPT

## 2021-01-19 PROCEDURE — 86663 EPSTEIN-BARR ANTIBODY: CPT

## 2021-01-19 PROCEDURE — 86038 ANTINUCLEAR ANTIBODIES: CPT

## 2021-01-19 PROCEDURE — 36415 COLL VENOUS BLD VENIPUNCTURE: CPT

## 2021-01-19 PROCEDURE — 82306 VITAMIN D 25 HYDROXY: CPT

## 2021-01-19 PROCEDURE — 86140 C-REACTIVE PROTEIN: CPT

## 2021-01-20 LAB
B BURGDOR IGG+IGM SER-ACNC: 22
BACTERIA UR CULT: NORMAL
EBV NA IGG SER IA-ACNC: >600 U/ML (ref 0–17.9)
EBV VCA IGG SER IA-ACNC: >600 U/ML (ref 0–17.9)
EBV VCA IGM SER IA-ACNC: <36 U/ML (ref 0–35.9)
INTERPRETATION: ABNORMAL
RYE IGE QN: NEGATIVE
THYROGLOB AB SERPL-ACNC: <1 IU/ML (ref 0–0.9)
THYROPEROXIDASE AB SERPL-ACNC: 14 IU/ML (ref 0–34)

## 2021-01-21 LAB — CCP IGA+IGG SERPL IA-ACNC: 6 UNITS (ref 0–19)

## 2021-02-05 ENCOUNTER — TELEMEDICINE (OUTPATIENT)
Dept: PAIN MEDICINE | Facility: CLINIC | Age: 56
End: 2021-02-05
Payer: COMMERCIAL

## 2021-02-05 DIAGNOSIS — M54.16 LUMBAR RADICULOPATHY: ICD-10-CM

## 2021-02-05 DIAGNOSIS — Z98.1 HISTORY OF LUMBAR FUSION: ICD-10-CM

## 2021-02-05 DIAGNOSIS — M96.1 POSTLAMINECTOMY SYNDROME: ICD-10-CM

## 2021-02-05 DIAGNOSIS — G89.4 CHRONIC PAIN SYNDROME: Primary | ICD-10-CM

## 2021-02-05 PROCEDURE — 1036F TOBACCO NON-USER: CPT | Performed by: STUDENT IN AN ORGANIZED HEALTH CARE EDUCATION/TRAINING PROGRAM

## 2021-02-05 PROCEDURE — 99214 OFFICE O/P EST MOD 30 MIN: CPT | Performed by: STUDENT IN AN ORGANIZED HEALTH CARE EDUCATION/TRAINING PROGRAM

## 2021-02-05 NOTE — PROGRESS NOTES
Virtual Regular Visit      Assessment/Plan:    Problem List Items Addressed This Visit     None      Visit Diagnoses     Chronic pain syndrome    -  Primary    Relevant Orders    FL spine and pain procedure    History of lumbar fusion        Postlaminectomy syndrome        Lumbar radiculopathy        Relevant Orders    FL spine and pain procedure          This is a 51-year-old female who presents via virtual visit again with chief complaint of bilateral lower extremity pain, numbness, and tingling  Most recent EMG of the bilateral lower extremities was normal   Notes that symptoms began after back surgery in 2019  In review, patient had L5-S1 fusion,   Anterior and posterior  Recently started Cymbalta 20 mg  Encouraged patient to continue taking this medication to observe its efficacy  She will continue taking gabapentin 600 mg t i d  while she is still titrating Cymbalta  We discussed switching   From gabapentin to Lyrica at her next visit after she has had some time to titrate Cymbalta  Also discussed was lumbar epidural steroid injection to help with her radiculopathy symptoms  Patient would like to hold off on any procedures for now and would like to see how she does with medications 1st   Also again discussed with spinal cord stimulation as a potential treatment option  Patient also complaining of restless leg type symptoms  Discussed that she should mention this to her PCP for possible treatment  Patient denies any red flag symptoms at this time  I will see her back in 4 weeks or sooner if medically necessary  Reason for visit is No chief complaint on file  Encounter provider Ambrosio Kelsey MD    Provider located at [address] [place] [id number]      Recent Visits  No visits were found meeting these conditions     Showing recent visits within past 7 days and meeting all other requirements Today's Visits  Date Type Provider Dept   02/05/21 Telemedicine Octavia Rosado MD [facility]   Showing today's visits and meeting all other requirements     Future Appointments  No visits were found meeting these conditions  Showing future appointments within next 150 days and meeting all other requirements        The patient was identified by name and date of birth  Janice Walls was informed that this is a telemedicine visit and that the visit is being conducted through [employer] and patient was informed that this is a secure, HIPAA-compliant platform  She agrees to proceed     My office door was closed  No one else was in the room  She acknowledged consent and understanding of privacy and security of the video platform  The patient has agreed to participate and understands they can discontinue the visit at any time  Patient is aware this is a billable service  Subjective  Janice Walls is a 54 y o  female   Who was last seen on 11/30/2020   Since last visit underwent b/l LE EMG which was normal  She has had to cancel PT due to weather multiple times  Will hopefully be starting soon  Recently started Cymbalta 20mg nightly   By her rheumatologist  Taken one dose and had trouble sleeping  Told patient she can take medication during the day if necessary  No other significant side effects  Reports improved allodynia in feet   Since last visit but still has tightness in lower legs, especially in the calves  Pain and tingling radiating down the legs in a nonspecific dermatomal distribution bilaterally into the medial portion of the foot into the big toe  Again reports that this all began after her spine surgery  Patient also endorses some restless legs  Symptoms and reports that her legs can not get comfortable at night when she is sleeping  Discussed mention this to her PCP due to possibility of RLS        OVERALL, SHE IS more less feeling the same since her last visit   She continues to take gabapentin 600 mg t i d  without any significant side effects  However, she does question its efficacy  HPI     Past Medical History:   Diagnosis Date    Abdominal pain     Arthralgia of pelvic region and thigh     Chest pain     CTS (carpal tunnel syndrome)     Fibromyalgia     Hepatitis     Leukocytopenia     Leukorrhea     Temporomandibular joint sounds on opening and/or closing the jaw        Past Surgical History:   Procedure Laterality Date    BACK SURGERY      CHOLECYSTECTOMY      FOOT SURGERY Right     INCONTINENCE SURGERY      vaginal sling for stress incontinence    [facility]    SINUS SURGERY      SPINE SURGERY  03/05/2019       Current Outpatient Medications   Medication Sig Dispense Refill    acetaminophen (TYLENOL) 325 mg tablet Take 500 mg by mouth as needed for mild pain      gabapentin (NEURONTIN) 600 MG tablet Take 1 tablet (600 mg total) by mouth 3 (three) times a day for 7 days 21 tablet 0    LINZESS 145 MCG CAPS TAKE 1 CAPSULE DAILY 90 capsule 3    mometasone (NASONEX) 50 mcg/act nasal spray 2 sprays into each nostril daily 51 g 3    multivitamin (THERAGRAN) TABS Take 1 tablet by mouth daily       No current facility-administered medications for this visit  Allergies   Allergen Reactions    Other Sneezing and Nasal Congestion     SEASONAL ALLERGIES       Review of Systems    Video Exam    There were no vitals filed for this visit  Physical Exam - not performed due to video visit      [id number] [facility] acknowledges that she has consented to an online visit or consultation  She understands that the online visit is based solely on information provided by her, and that, in the absence of a face-to-face physical evaluation by the physician, the diagnosis she receives is both limited and provisional in terms of accuracy and completeness   This is not intended to replace a full medical face-to-face evaluation by the physician  Mickie Lee understands and accepts these terms

## 2021-03-30 DIAGNOSIS — Z23 ENCOUNTER FOR IMMUNIZATION: ICD-10-CM

## 2021-04-02 ENCOUNTER — OFFICE VISIT (OUTPATIENT)
Dept: PAIN MEDICINE | Facility: CLINIC | Age: 56
End: 2021-04-02
Payer: COMMERCIAL

## 2021-04-02 VITALS
BODY MASS INDEX: 29.21 KG/M2 | WEIGHT: 148.8 LBS | RESPIRATION RATE: 16 BRPM | HEIGHT: 60 IN | SYSTOLIC BLOOD PRESSURE: 144 MMHG | HEART RATE: 60 BPM | DIASTOLIC BLOOD PRESSURE: 88 MMHG

## 2021-04-02 DIAGNOSIS — M54.16 LUMBAR RADICULOPATHY: ICD-10-CM

## 2021-04-02 DIAGNOSIS — G89.4 CHRONIC PAIN SYNDROME: ICD-10-CM

## 2021-04-02 DIAGNOSIS — M96.1 LUMBAR POST-LAMINECTOMY SYNDROME: Primary | ICD-10-CM

## 2021-04-02 PROCEDURE — 3008F BODY MASS INDEX DOCD: CPT | Performed by: STUDENT IN AN ORGANIZED HEALTH CARE EDUCATION/TRAINING PROGRAM

## 2021-04-02 PROCEDURE — 99214 OFFICE O/P EST MOD 30 MIN: CPT | Performed by: STUDENT IN AN ORGANIZED HEALTH CARE EDUCATION/TRAINING PROGRAM

## 2021-04-02 PROCEDURE — 1036F TOBACCO NON-USER: CPT | Performed by: STUDENT IN AN ORGANIZED HEALTH CARE EDUCATION/TRAINING PROGRAM

## 2021-04-02 RX ORDER — GABAPENTIN 800 MG/1
800 TABLET ORAL 3 TIMES DAILY
Qty: 30 TABLET | Refills: 0 | Status: SHIPPED | OUTPATIENT
Start: 2021-04-02 | End: 2021-04-12 | Stop reason: SDUPTHER

## 2021-04-02 NOTE — PROGRESS NOTES
Pain Medicine Follow-Up Note    Assessment:  1  Lumbar post-laminectomy syndrome    2  Lumbar radiculopathy    3  Chronic pain syndrome        Plan:  No orders of the defined types were placed in this encounter  New Medications Ordered This Visit   Medications    gabapentin (NEURONTIN) 800 mg tablet     Sig: Take 1 tablet (800 mg total) by mouth 3 (three) times a day     Dispense:  30 tablet     Refill:  0       My impressions and treatment recommendations were discussed in detail with the patient who verbalized understanding and had no further questions  this is a 42-year-old female  Returns for this with again chief complaint of bilateral leg pain below the knees  No significant back pain  Continues to do well with gabapentin 600 mg t i d  reports that she takes the daytime dose later in the day so that overlaps with nighttime dose to help her sleep  Denies any significant side effects  Given some benefit at current dose, we can increase gabapentin to 800 mg t i d   Again, risks and side effects of medication were discussed  Advised patient that we are nearing max dose of gabapentin and would not likely titrate higher than this  Her symptoms are most bothersome at night and gabapentin helps with her restless leg symptoms in the cramping, numbness, and pins and needles in the bilateral lower extremities  Of note most recent EMG in November was normal and not indicative of lumbar radiculopathy  In regards to procedures, patient previously declined and continues to decline procedures today  If patient is doing well on increased dose of gabapentin, will send further refills  She will call to let us know  At her next visit if she is doing well, we will discuss weaning the medication  Lastly, I also discussed medical marijuana with the patient as a potential treatment option  A list of providers in the [facility] were provided to the patient    She reports that she is going to try a marijuana gummy  And will see if this helps [address] Prescription Drug Monitoring Program report was reviewed and was appropriate     Follow-up is planned in   Three months time or sooner as warranted  Discharge instructions were provided  I personally saw and examined the patient and I agree with the above discussed plan of care  History of Present Illness:    Angel Villa is a 54 y o  female who presents to [facility] for interval re-evaluation of the above stated pain complaints  The patient has a past medical and chronic pain history as outlined in the assessment section  She was last seen on 02/05/21  Via telemedicine at which time gabapentin 600 mg t i d  was continued  Continues to note improvement with the medication  Percentage of pain relief is 80%  It helps most with her nighttime leg pain  Continues to have pain in the bilateral lower extremities below the knees  Described as sharp, throbbing, cramping, pressure-like, numbness, and pins and needles  Reports that gabapentin helps with the sensations and helps with her sleep         No significant side effects from the gabapentin  Reports that she takes the daytime dose  Later in the day/evening so that helps more with her sleep  Other than as stated above, the patient denies any interval changes in medications, medical condition, mental condition, symptoms, or allergies since the last office visit  Review of Systems:    Review of Systems   Respiratory: Negative for shortness of breath  Cardiovascular: Negative for chest pain  Gastrointestinal: Negative for constipation, diarrhea, nausea and vomiting  Musculoskeletal: Positive for gait problem  Negative for arthralgias, joint swelling and myalgias  Joint stiffness and pain in extremity  Skin: Negative for rash  Neurological: Negative for dizziness, seizures and weakness     All other systems reviewed and are negative          Patient Active Problem List   Diagnosis    Extrinsic asthma    Spinal stenosis of lumbosacral region    Irritable bowel syndrome    Mixed hyperlipidemia    HNP (herniated nucleus pulposus), lumbar    S/P lumbar spine operation    Intramural leiomyoma of uterus    Chronic idiopathic constipation    Impaired fasting glucose       Past Medical History:   Diagnosis Date    Abdominal pain     Arthralgia of pelvic region and thigh     Chest pain     CTS (carpal tunnel syndrome)     Fibromyalgia     Hepatitis     Leukocytopenia     Leukorrhea     Temporomandibular joint sounds on opening and/or closing the jaw        Past Surgical History:   Procedure Laterality Date    BACK SURGERY      CHOLECYSTECTOMY      FOOT SURGERY Right     INCONTINENCE SURGERY      vaginal sling for stress incontinence    [facility]    SINUS SURGERY      SPINE SURGERY  03/05/2019       Family History   Problem Relation Age of Onset    Alzheimer's disease Father     Asthma Father     Heart attack Father     Alzheimer's disease Maternal Grandmother     Lymphoma Maternal Aunt     Emphysema Maternal Grandfather        Social History     Occupational History    Occupation: [employer]   Tobacco Use    Smoking status: Never Smoker    Smokeless tobacco: Never Used   Substance and Sexual Activity    Alcohol use: No    Drug use: No    Sexual activity: Yes     Partners: Male     Birth control/protection: None         Current Outpatient Medications:     acetaminophen (TYLENOL) 325 mg tablet, Take 500 mg by mouth as needed for mild pain, Disp: , Rfl:     LINZESS 145 MCG CAPS, TAKE 1 CAPSULE DAILY, Disp: 90 capsule, Rfl: 3    mometasone (NASONEX) 50 mcg/act nasal spray, 2 sprays into each nostril daily, Disp: 51 g, Rfl: 3    multivitamin (THERAGRAN) TABS, Take 1 tablet by mouth daily, Disp: , Rfl:     gabapentin (NEURONTIN) 800 mg tablet, Take 1 tablet (800 mg total) by mouth 3 (three) times a day, Disp: 30 tablet, Rfl: 0    Allergies   Allergen Reactions    Other Sneezing and Nasal Congestion     SEASONAL ALLERGIES       Physical Exam:    /88   Pulse 60   Resp 16   Ht 5' (1 524 m)   Wt 67 5 kg (148 lb 12 8 oz)   BMI 29 06 kg/m²     Constitutional:normal, well developed, well nourished, alert, in no distress and non-toxic and no overt pain behavior  Eyes:anicteric  HEENT:grossly intact  Neck:supple, symmetric, trachea midline and no masses   Pulmonary:even and unlabored  Cardiovascular:No edema or pitting edema present  Skin:Normal without rashes or lesions and well hydrated  Psychiatric:Mood and affect appropriate  Neurologic:Cranial Nerves II-XII grossly intact  Musculoskeletal:normal      Imaging  No orders to display         No orders of the defined types were placed in this encounter

## 2021-04-12 ENCOUNTER — TELEPHONE (OUTPATIENT)
Dept: PAIN MEDICINE | Facility: CLINIC | Age: 56
End: 2021-04-12

## 2021-04-12 DIAGNOSIS — G89.4 CHRONIC PAIN SYNDROME: ICD-10-CM

## 2021-04-12 DIAGNOSIS — M54.16 LUMBAR RADICULOPATHY: ICD-10-CM

## 2021-04-12 RX ORDER — GABAPENTIN 800 MG/1
800 TABLET ORAL 3 TIMES DAILY
Qty: 270 TABLET | Refills: 0 | Status: SHIPPED | OUTPATIENT
Start: 2021-04-12 | End: 2021-06-23 | Stop reason: SDUPTHER

## 2021-04-12 NOTE — TELEPHONE ENCOUNTER
Pt contacted Call Center requested refill of their medication  Medication Name:  Gabapentin     Dosage of Med:  800 mg    Frequency of Med:  3 x a day    Remaining Medication:  3 left    Pharmacy and Location:    [facility] mail order 90 day supply    Pt  Preferred Callback Phone Number:    [phone]  Thank you      Pt is doing very well with the Gabapentin 800 mg 3 x a day

## 2021-04-12 NOTE — TELEPHONE ENCOUNTER
Last ov 4/2  Pt would like a 90 day supply of gabapentin to [facility] on file instead of [facility]

## 2021-05-08 ENCOUNTER — TRANSCRIBE ORDERS (OUTPATIENT)
Dept: ADMINISTRATIVE | Facility: HOSPITAL | Age: 56
End: 2021-05-08

## 2021-05-08 ENCOUNTER — APPOINTMENT (OUTPATIENT)
Dept: LAB | Facility: HOSPITAL | Age: 56
End: 2021-05-08
Payer: COMMERCIAL

## 2021-05-08 DIAGNOSIS — M79.7 SCAPULOHUMERAL FIBROSITIS: ICD-10-CM

## 2021-05-08 DIAGNOSIS — K58.9 IRRITABLE BOWEL SYNDROME, UNSPECIFIED TYPE: ICD-10-CM

## 2021-05-08 DIAGNOSIS — M72.2 PLANTAR FASCIAL FIBROMATOSIS: ICD-10-CM

## 2021-05-08 DIAGNOSIS — Z79.899 ENCOUNTER FOR LONG-TERM (CURRENT) USE OF OTHER MEDICATIONS: ICD-10-CM

## 2021-05-08 DIAGNOSIS — M47.816 LUMBAR SPONDYLOSIS: ICD-10-CM

## 2021-05-08 DIAGNOSIS — G89.4 CHRONIC PAIN SYNDROME: ICD-10-CM

## 2021-05-08 DIAGNOSIS — D72.819 LEUKOPENIA, UNSPECIFIED TYPE: ICD-10-CM

## 2021-05-08 DIAGNOSIS — R73.09 IMPAIRED GLUCOSE TOLERANCE TEST: ICD-10-CM

## 2021-05-08 DIAGNOSIS — G89.4 CHRONIC PAIN SYNDROME: Primary | ICD-10-CM

## 2021-05-08 LAB
ALBUMIN SERPL BCP-MCNC: 3.6 G/DL (ref 3.5–5)
ALP SERPL-CCNC: 111 U/L (ref 46–116)
ALT SERPL W P-5'-P-CCNC: 37 U/L (ref 12–78)
ANION GAP SERPL CALCULATED.3IONS-SCNC: 10 MMOL/L (ref 4–13)
AST SERPL W P-5'-P-CCNC: 28 U/L (ref 5–45)
BASOPHILS # BLD AUTO: 0.04 THOUSANDS/ΜL (ref 0–0.1)
BASOPHILS NFR BLD AUTO: 1 % (ref 0–1)
BILIRUB SERPL-MCNC: 0.43 MG/DL (ref 0.2–1)
BUN SERPL-MCNC: 13 MG/DL (ref 5–25)
CALCIUM SERPL-MCNC: 9.2 MG/DL (ref 8.3–10.1)
CHLORIDE SERPL-SCNC: 106 MMOL/L (ref 100–108)
CO2 SERPL-SCNC: 26 MMOL/L (ref 21–32)
CREAT SERPL-MCNC: 0.73 MG/DL (ref 0.6–1.3)
EOSINOPHIL # BLD AUTO: 0.15 THOUSAND/ΜL (ref 0–0.61)
EOSINOPHIL NFR BLD AUTO: 4 % (ref 0–6)
ERYTHROCYTE [DISTWIDTH] IN BLOOD BY AUTOMATED COUNT: 12.5 % (ref 11.6–15.1)
ERYTHROCYTE [SEDIMENTATION RATE] IN BLOOD: 31 MM/HOUR (ref 0–29)
EST. AVERAGE GLUCOSE BLD GHB EST-MCNC: 100 MG/DL
GFR SERPL CREATININE-BSD FRML MDRD: 93 ML/MIN/1.73SQ M
GLUCOSE P FAST SERPL-MCNC: 102 MG/DL (ref 65–99)
HBA1C MFR BLD: 5.1 %
HCT VFR BLD AUTO: 37.6 % (ref 34.8–46.1)
HGB BLD-MCNC: 12.4 G/DL (ref 11.5–15.4)
IMM GRANULOCYTES # BLD AUTO: 0.02 THOUSAND/UL (ref 0–0.2)
IMM GRANULOCYTES NFR BLD AUTO: 1 % (ref 0–2)
LYMPHOCYTES # BLD AUTO: 1.29 THOUSANDS/ΜL (ref 0.6–4.47)
LYMPHOCYTES NFR BLD AUTO: 32 % (ref 14–44)
MCH RBC QN AUTO: 29.5 PG (ref 26.8–34.3)
MCHC RBC AUTO-ENTMCNC: 33 G/DL (ref 31.4–37.4)
MCV RBC AUTO: 90 FL (ref 82–98)
MONOCYTES # BLD AUTO: 0.33 THOUSAND/ΜL (ref 0.17–1.22)
MONOCYTES NFR BLD AUTO: 8 % (ref 4–12)
NEUTROPHILS # BLD AUTO: 2.2 THOUSANDS/ΜL (ref 1.85–7.62)
NEUTS SEG NFR BLD AUTO: 54 % (ref 43–75)
NRBC BLD AUTO-RTO: 0 /100 WBCS
PLATELET # BLD AUTO: 240 THOUSANDS/UL (ref 149–390)
PMV BLD AUTO: 10.6 FL (ref 8.9–12.7)
POTASSIUM SERPL-SCNC: 3.7 MMOL/L (ref 3.5–5.3)
PROT SERPL-MCNC: 7.9 G/DL (ref 6.4–8.2)
RBC # BLD AUTO: 4.2 MILLION/UL (ref 3.81–5.12)
SODIUM SERPL-SCNC: 142 MMOL/L (ref 136–145)
WBC # BLD AUTO: 4.03 THOUSAND/UL (ref 4.31–10.16)

## 2021-05-08 PROCEDURE — 83036 HEMOGLOBIN GLYCOSYLATED A1C: CPT

## 2021-05-08 PROCEDURE — 85652 RBC SED RATE AUTOMATED: CPT

## 2021-05-08 PROCEDURE — 36415 COLL VENOUS BLD VENIPUNCTURE: CPT

## 2021-05-08 PROCEDURE — 86664 EPSTEIN-BARR NUCLEAR ANTIGEN: CPT

## 2021-05-08 PROCEDURE — 80053 COMPREHEN METABOLIC PANEL: CPT

## 2021-05-08 PROCEDURE — 87086 URINE CULTURE/COLONY COUNT: CPT

## 2021-05-08 PROCEDURE — 85025 COMPLETE CBC W/AUTO DIFF WBC: CPT

## 2021-05-09 LAB — BACTERIA UR CULT: NORMAL

## 2021-05-10 LAB — EBV NA IGG SER IA-ACNC: >600 U/ML (ref 0–17.9)

## 2021-05-18 DIAGNOSIS — K58.1 IRRITABLE BOWEL SYNDROME WITH CONSTIPATION: Chronic | ICD-10-CM

## 2021-05-18 RX ORDER — LINACLOTIDE 145 UG/1
CAPSULE, GELATIN COATED ORAL
Qty: 90 CAPSULE | Refills: 3 | Status: SHIPPED | OUTPATIENT
Start: 2021-05-18

## 2021-06-14 ENCOUNTER — OFFICE VISIT (OUTPATIENT)
Dept: INTERNAL MEDICINE CLINIC | Facility: CLINIC | Age: 56
End: 2021-06-14
Payer: COMMERCIAL

## 2021-06-14 VITALS
DIASTOLIC BLOOD PRESSURE: 80 MMHG | BODY MASS INDEX: 29.45 KG/M2 | TEMPERATURE: 97.9 F | HEART RATE: 60 BPM | WEIGHT: 150 LBS | SYSTOLIC BLOOD PRESSURE: 130 MMHG | OXYGEN SATURATION: 98 % | HEIGHT: 60 IN

## 2021-06-14 DIAGNOSIS — J45.20 MILD INTERMITTENT EXTRINSIC ASTHMA WITHOUT COMPLICATION: ICD-10-CM

## 2021-06-14 DIAGNOSIS — H92.03 OTALGIA OF BOTH EARS: Primary | ICD-10-CM

## 2021-06-14 PROCEDURE — 3725F SCREEN DEPRESSION PERFORMED: CPT | Performed by: INTERNAL MEDICINE

## 2021-06-14 PROCEDURE — 99214 OFFICE O/P EST MOD 30 MIN: CPT | Performed by: INTERNAL MEDICINE

## 2021-06-14 PROCEDURE — 3008F BODY MASS INDEX DOCD: CPT | Performed by: INTERNAL MEDICINE

## 2021-06-14 RX ORDER — PREDNISONE 10 MG/1
TABLET ORAL
Qty: 30 TABLET | Refills: 0 | Status: SHIPPED | OUTPATIENT
Start: 2021-06-14 | End: 2021-06-24

## 2021-06-14 RX ORDER — ASCORBIC ACID 1000 MG
1000 TABLET, EXTENDED RELEASE ORAL
COMMUNITY

## 2021-06-14 NOTE — PATIENT INSTRUCTIONS
Eustachian Tube Dysfunction   AMBULATORY CARE:   Eustachian tube dysfunction (ETD)  is a condition that prevents your eustachian tubes from opening properly  It can also cause them to become blocked  Eustachian tubes connect your middle ear to the back of your nose and throat  These tubes open and allow air to flow in and out when you sneeze, swallow, or yawn  Common signs and symptoms include the following:   · Fullness or pressure in your ears    · Muffled hearing, or a feeling you are hearing under water or have clogged ears    · Pain in one or both ears    · Ringing in your ears    · Popping, crackling, or clicking feeling in your ears    · Trouble keeping your balance    Call your doctor or otolaryngologist if:   · Your symptoms do not improve or get worse  · You have a fever  · You have any hearing loss  · You have questions or concerns about your condition or care  Treatment:  ETD may get better on its own without any treatment  If it continues, you may need any of the following:  · Swallow, yawn, or chew gum  to help open your eustachian tubes  Your healthcare provider may also recommend you blow with your mouth shut and your nostrils pinched closed  · Air pressure devices  push air into your nose and eustachian tubes to help relieve air pressure in your ear  · Treatment for allergies  such as decongestants, antihistamines, and nasal steroids may improve ETD  They may help decrease swelling of the eustachian tubes  · A myringotomy  is surgery to make a hole in your eardrum  The hole relieves pressure and lets fluid drain from your ear  A pressure equalizing (PE) tube may be used to keep the hole open and to help drain fluid  · Tuboplasty  is a procedure to widen your eustachian tubes  Follow up with your doctor or otolaryngologist as directed:  Write down your questions so you remember to ask them during your visits    © Copyright [employer] 2020 Information is for End User's use only and may not be sold, redistributed or otherwise used for commercial purposes  All illustrations and images included in CareNotes® are the copyrighted property of A D A M , Inc  or Sara Rivas  The above information is an  only  It is not intended as medical advice for individual conditions or treatments  Talk to your doctor, nurse or pharmacist before following any medical regimen to see if it is safe and effective for you

## 2021-06-15 NOTE — PROGRESS NOTES
[facility]    NAME: Jenny Jean  AGE: 64 y o  SEX: female  : 1965     DATE: 6/15/2021     Assessment and Plan:     1  Otalgia of both ears    No sign of infection  She is very bothered by the pain, but exam is benign except some fluid behind bilateral TMs  Suspect eustachian tube dysfunction  Trial of prednisone  Continue anti-histamines  Start taking ibuprofen/aleve  Continue to blow with mouth shut and nostrils pinched closed  Also would recommend nasal corticosteroids  - predniSONE 10 mg tablet; 4 tab x 4 days, 3 tabs x 3 days, 2 tabs x 2 days, 1 tab x 1 day then stop  Dispense: 30 tablet; Refill: 0    2  Mild intermittent extrinsic asthma without complication    Asthma has been well controlled  She denies any concerns with her allergies this season  No recent use of rescue inhaler  BMI Counseling: Body mass index is 29 29 kg/m²  The BMI is above normal  Nutrition recommendations include limiting drinks that contain sugar, moderation in carbohydrate intake and increasing intake of lean protein  Chief Complaint:     Chief Complaint   Patient presents with    ear pain     on and off for a week- more on the lt ear now going to rt ear; throat pain on lt side, headache , started few days ago         History of Present Illness:     Ear pain x 1 week  Started in left ear and now feeling it in right ear as well  Still worse in the left ear  Feels like something is in her ear and her ear is full  She has tried tylenol for the pain  Denies sinus congestion, post-nasal drip, rhinorrhea, fever, chills, tinnitus, hearing loss  Glands have felt a little swollen and starting to have some sore throat  No recent sick contacts  She is fully vaccinated against COVID-19  Intermittent jaw pain  She denies any problems with her asthma recently  Denies increased inhaler use       Review of Systems:     Review of Systems   Constitutional: Negative for chills, fatigue and fever  HENT: Positive for ear pain and sore throat  Negative for congestion, ear discharge, facial swelling, hearing loss, postnasal drip, rhinorrhea, sinus pressure, sinus pain, tinnitus, trouble swallowing and voice change  Jaw pain   Respiratory: Negative  Cardiovascular: Negative  Gastrointestinal: Negative  Neurological: Negative for dizziness, light-headedness and headaches  Objective:     /80 (BP Location: Left arm, Patient Position: Sitting) Comment: gdf  Pulse 60   Temp 97 9 °F (36 6 °C) (Tympanic) Comment: gd  Ht 5' (1 524 m)   Wt 68 kg (150 lb)   SpO2 98%   BMI 29 29 kg/m²     Physical Exam  Constitutional:       General: She is not in acute distress  Appearance: She is not ill-appearing  HENT:      Right Ear: Tympanic membrane, ear canal and external ear normal  There is no impacted cerumen  Left Ear: Tympanic membrane, ear canal and external ear normal  There is no impacted cerumen  Ears:      Comments: Increased fluid behind both ears     Mouth/Throat:      Mouth: Mucous membranes are moist       Pharynx: No oropharyngeal exudate or posterior oropharyngeal erythema  Eyes:      General: No scleral icterus  Right eye: No discharge  Left eye: No discharge  Cardiovascular:      Rate and Rhythm: Normal rate and regular rhythm  Heart sounds: No murmur heard  Pulmonary:      Effort: Pulmonary effort is normal  No respiratory distress  Breath sounds: No wheezing  Neurological:      Mental Status: She is alert           Quinn Sanchez DO  [facility]

## 2021-08-27 ENCOUNTER — OFFICE VISIT (OUTPATIENT)
Dept: INTERNAL MEDICINE CLINIC | Facility: CLINIC | Age: 56
End: 2021-08-27
Payer: COMMERCIAL

## 2021-08-27 VITALS
WEIGHT: 146 LBS | RESPIRATION RATE: 14 BRPM | HEART RATE: 64 BPM | SYSTOLIC BLOOD PRESSURE: 110 MMHG | BODY MASS INDEX: 28.66 KG/M2 | OXYGEN SATURATION: 96 % | DIASTOLIC BLOOD PRESSURE: 60 MMHG | HEIGHT: 60 IN | TEMPERATURE: 98.2 F

## 2021-08-27 DIAGNOSIS — R09.82 POST-NASAL DRIP: ICD-10-CM

## 2021-08-27 DIAGNOSIS — Z00.00 ADULT GENERAL MEDICAL EXAMINATION: Primary | ICD-10-CM

## 2021-08-27 DIAGNOSIS — E78.00 BORDERLINE HYPERCHOLESTEROLEMIA: Chronic | ICD-10-CM

## 2021-08-27 PROBLEM — R73.01 IMPAIRED FASTING GLUCOSE: Status: RESOLVED | Noted: 2019-12-10 | Resolved: 2021-08-27

## 2021-08-27 PROCEDURE — 99396 PREV VISIT EST AGE 40-64: CPT | Performed by: INTERNAL MEDICINE

## 2021-08-27 PROCEDURE — 1036F TOBACCO NON-USER: CPT | Performed by: INTERNAL MEDICINE

## 2021-08-27 PROCEDURE — 3008F BODY MASS INDEX DOCD: CPT | Performed by: INTERNAL MEDICINE

## 2021-08-27 PROCEDURE — 3725F SCREEN DEPRESSION PERFORMED: CPT | Performed by: INTERNAL MEDICINE

## 2021-08-27 RX ORDER — MOMETASONE FUROATE 50 UG/1
2 SPRAY, METERED NASAL DAILY
Qty: 51 G | Refills: 3 | Status: SHIPPED | OUTPATIENT
Start: 2021-08-27

## 2021-08-27 NOTE — PATIENT INSTRUCTIONS

## 2021-08-27 NOTE — PROGRESS NOTES
ADULT ANNUAL PHYSICAL   [facility]    NAME: Torrey Sahu  AGE: 64 y o  SEX: female  : 1965     DATE: 2021     Assessment and Plan:     Problem List Items Addressed This Visit        Other    Borderline hypercholesterolemia (Chronic)    Relevant Orders    Hepatic function panel    Lipid panel    Basic metabolic panel      Other Visit Diagnoses     Adult general medical examination    -  Primary    Relevant Orders    Hepatic function panel    Lipid panel    Basic metabolic panel    Post-nasal drip        Relevant Medications    mometasone (NASONEX) 50 mcg/act nasal spray        Immunizations and preventive care screenings were discussed with patient today  Appropriate education was printed on patient's after visit summary  Counseling:  Alcohol/drug use: discussed moderation in alcohol intake, the recommendations for healthy alcohol use, and avoidance of illicit drug use  Dental Health: discussed importance of regular tooth brushing, flossing, and dental visits  Injury prevention: discussed safety/seat belts, safety helmets, smoke detectors, carbon dioxide detectors, and smoking near bedding or upholstery  · Sexual health: discussed sexually transmitted diseases, partner selection, use of condoms, avoidance of unintended pregnancy, and contraceptive alternatives  BMI Counseling: Body mass index is 28 51 kg/m²  The BMI is above normal  Nutrition recommendations include decreasing portion sizes, encouraging healthy choices of fruits and vegetables, limiting drinks that contain sugar, moderation in carbohydrate intake and increasing intake of lean protein  Exercise recommendations include exercising 3-5 times per week           Return in about 1 year (around 2022) for Annual Physical      Chief Complaint:     Chief Complaint   Patient presents with    Physical Exam      History of Present Illness:     Adult Annual Physical Patient here for a comprehensive physical exam  The patient reports no new problems with her health  Continues to struggle with back pain and neuropathy  Stable on gabapentin  Trying to only take tylenol/meloxicam sparingly  Diet and Physical Activity  · Diet/Nutrition: well balanced diet  Depression Screening  PHQ-9 Depression Screening    PHQ-9:   Frequency of the following problems over the past two weeks:      Little interest or pleasure in doing things: 0 - not at all  Feeling down, depressed, or hopeless: 0 - not at all  PHQ-2 Score: 0       General Health  · Sleep: gets 4-6 hours of sleep on average  · Hearing: normal - bilateral   · Vision: no vision problems  · Dental: regular dental visits and brushes teeth twice daily  Review of Systems:     Review of Systems   Constitutional: Negative for activity change, appetite change and fatigue  HENT: Positive for postnasal drip  Negative for congestion and sore throat  Respiratory: Negative for apnea, cough, chest tightness, shortness of breath and wheezing  Cardiovascular: Negative for chest pain, palpitations and leg swelling  Gastrointestinal: Negative for abdominal distention, abdominal pain, blood in stool, constipation, diarrhea, nausea and vomiting  Genitourinary: Negative  Musculoskeletal: Positive for arthralgias and back pain  Negative for gait problem, joint swelling and myalgias  Skin: Negative for rash and wound  Neurological: Positive for numbness  Negative for dizziness, weakness, light-headedness and headaches  Psychiatric/Behavioral: Negative for behavioral problems, confusion, hallucinations, sleep disturbance and suicidal ideas  The patient is not nervous/anxious         Past Medical History:     Past Medical History:   Diagnosis Date    Abdominal pain     Arthralgia of pelvic region and thigh     Chest pain     CTS (carpal tunnel syndrome)     Fibromyalgia     Hepatitis     Leukocytopenia     Leukorrhea     Temporomandibular joint sounds on opening and/or closing the jaw       Past Surgical History:     Past Surgical History:   Procedure Laterality Date    BACK SURGERY      CHOLECYSTECTOMY      FOOT SURGERY Right     INCONTINENCE SURGERY      vaginal sling for stress incontinence    [facility]    SINUS SURGERY      SPINE SURGERY  03/05/2019      Social History:     Social History     Socioeconomic History    Marital status: /Civil Union     Spouse name: None    Number of children: None    Years of education: None    Highest education level: None   Occupational History    Occupation: homemaker   Tobacco Use    Smoking status: Never Smoker    Smokeless tobacco: Never Used   Vaping Use    Vaping Use: Never used   Substance and Sexual Activity    Alcohol use: No    Drug use: No    Sexual activity: Yes     Partners: Male     Birth control/protection: None   Other Topics Concern    None   Social History Narrative    None     Social Determinants of Health     Financial Resource Strain:     Difficulty of Paying Living Expenses:    Food Insecurity:     Worried About Running Out of Food in the Last Year:     Ran Out of Food in the Last Year:    Transportation Needs:     Lack of Transportation (Medical):      Lack of Transportation (Non-Medical):    Physical Activity:     Days of Exercise per Week:     Minutes of Exercise per Session:    Stress:     Feeling of Stress :    Social Connections:     Frequency of Communication with Friends and Family:     Frequency of Social Gatherings with Friends and Family:     Attends Faith Services:     Active Member of Clubs or Organizations:     Attends Club or Organization Meetings:     Marital Status:    Intimate Partner Violence:     Fear of Current or Ex-Partner:     Emotionally Abused:     Physically Abused:     Sexually Abused:       Family History:     Family History   Problem Relation Age of Onset    Alzheimer's disease Father     Asthma Father     Heart attack Father     Alzheimer's disease Maternal Grandmother     Lymphoma Maternal Aunt     Emphysema Maternal Grandfather       Current Medications:     Current Outpatient Medications   Medication Sig Dispense Refill    acetaminophen (TYLENOL) 325 mg tablet Take 500 mg by mouth as needed for mild pain      Ascorbic Acid (Vitamin C ER) 1000 MG TBCR Take 1,000 mg by mouth      Cholecalciferol 50 MCG (2000 UT) TABS Take 2,000 Units by mouth      gabapentin (NEURONTIN) 800 mg tablet Take 1 tablet (800 mg total) by mouth 3 (three) times a day 270 tablet 0    Linzess 145 MCG CAPS TAKE 1 CAPSULE DAILY 90 capsule 3    mometasone (NASONEX) 50 mcg/act nasal spray 2 sprays into each nostril daily 51 g 3    multivitamin (THERAGRAN) TABS Take 1 tablet by mouth daily       No current facility-administered medications for this visit  Allergies: Allergies   Allergen Reactions    Other Sneezing and Nasal Congestion     SEASONAL ALLERGIES      Physical Exam:     /60 (BP Location: Left arm, Patient Position: Sitting, Cuff Size: Standard)   Pulse 64   Temp 98 2 °F (36 8 °C) (Tympanic)   Resp 14   Ht 5' (1 524 m)   Wt 66 2 kg (146 lb)   SpO2 96%   BMI 28 51 kg/m²     Physical Exam  Vitals reviewed  Constitutional:       General: She is not in acute distress  Appearance: She is well-developed  She is not diaphoretic  HENT:      Right Ear: Tympanic membrane, ear canal and external ear normal  There is no impacted cerumen  Left Ear: Tympanic membrane, ear canal and external ear normal  There is no impacted cerumen  Eyes:      General: No scleral icterus  Right eye: No discharge  Left eye: No discharge  Conjunctiva/sclera: Conjunctivae normal    Neck:      Thyroid: No thyromegaly  Vascular: No JVD  Cardiovascular:      Rate and Rhythm: Normal rate and regular rhythm  Heart sounds: Normal heart sounds   No murmur heard  Pulmonary:      Effort: Pulmonary effort is normal  No respiratory distress  Breath sounds: Normal breath sounds  No wheezing or rales  Abdominal:      General: Bowel sounds are normal  There is no distension  Palpations: Abdomen is soft  Tenderness: There is no abdominal tenderness  Musculoskeletal:      Cervical back: Neck supple  Right lower leg: No edema  Left lower leg: No edema  Lymphadenopathy:      Cervical: No cervical adenopathy  Skin:     General: Skin is warm and dry  Neurological:      Mental Status: She is alert  Mental status is at baseline        Gait: Gait normal    Psychiatric:         Mood and Affect: Mood normal          Behavior: Behavior normal         Sushant Abernathy   MEDICAL [address]

## 2021-09-27 ENCOUNTER — OFFICE VISIT (OUTPATIENT)
Dept: PAIN MEDICINE | Facility: CLINIC | Age: 56
End: 2021-09-27
Payer: COMMERCIAL

## 2021-09-27 VITALS
RESPIRATION RATE: 18 BRPM | DIASTOLIC BLOOD PRESSURE: 84 MMHG | HEART RATE: 57 BPM | BODY MASS INDEX: 28.66 KG/M2 | HEIGHT: 60 IN | SYSTOLIC BLOOD PRESSURE: 129 MMHG | WEIGHT: 146 LBS

## 2021-09-27 DIAGNOSIS — M54.16 LUMBAR RADICULOPATHY: ICD-10-CM

## 2021-09-27 DIAGNOSIS — M79.2 NEUROPATHIC PAIN: ICD-10-CM

## 2021-09-27 DIAGNOSIS — M96.1 LUMBAR POST-LAMINECTOMY SYNDROME: Primary | ICD-10-CM

## 2021-09-27 DIAGNOSIS — G89.4 CHRONIC PAIN SYNDROME: ICD-10-CM

## 2021-09-27 PROCEDURE — 3008F BODY MASS INDEX DOCD: CPT | Performed by: PSYCHIATRY & NEUROLOGY

## 2021-09-27 PROCEDURE — 99214 OFFICE O/P EST MOD 30 MIN: CPT | Performed by: STUDENT IN AN ORGANIZED HEALTH CARE EDUCATION/TRAINING PROGRAM

## 2021-09-27 RX ORDER — GABAPENTIN 800 MG/1
800 TABLET ORAL 3 TIMES DAILY
Qty: 270 TABLET | Refills: 1 | Status: SHIPPED | OUTPATIENT
Start: 2021-09-27 | End: 2022-03-28 | Stop reason: SDUPTHER

## 2021-09-27 NOTE — PROGRESS NOTES
Pain Medicine Follow-Up Note    Assessment:  1  Lumbar post-laminectomy syndrome    2  Lumbar radiculopathy    3  Chronic pain syndrome    4  Neuropathic pain        Plan:  Orders Placed This Encounter   Procedures    Ambulatory referral to Neurology     Standing Status:   Future     Standing Expiration Date:   9/27/2022     Referral Priority:   Routine     Referral Type:   Consult - AMB     Referral Reason:   Specialty Services Required     Referred to Provider:   Claudia Paulino MD     Requested Specialty:   Neurology     Number of Visits Requested:   1     Expiration Date:   9/27/2022       New Medications Ordered This Visit   Medications    gabapentin (NEURONTIN) 800 mg tablet     Sig: Take 1 tablet (800 mg total) by mouth 3 (three) times a day     Dispense:  270 tablet     Refill:  1       My impressions and treatment recommendations were discussed in detail with the patient who verbalized understanding and had no further questions  45-year-old female who presents to our office again with chief complaint of neuropathic pain below the knees bilaterally  EMG was notably normal   She has history of 2 spinal surgeries in 2019 including a LD F at L5-S1 and posterior L5-S1 decompression and fusion  She continues to have neuropathic symptoms and is questioning whether these are a result of her procedure as they were not present prior to the surgery  I discussed again the results of the EMG study  I discussed that based on EMG, it does not appear that there is a chronic nerve injury  She continues on gabapentin 800 mg t i d  with continued improvement in her symptoms without any significant side effects  She does not want to trial any antidepressant medications to help with her neuropathic pain  Nor does she wish to undergo injections or spinal cord stimulator trial     I will resend gabapentin 6 month supply  We will see her back in 6 months  I would also like for her to establish care with Neurology  Wonder if there is possibility of small fiber neuropathy that was unable to be picked up by EMG  Referral to Neurology to help rule this out  [facility] Prescription Drug Monitoring Program report was reviewed and was appropriate     Complete risks and benefits including bleeding, infection, tissue reaction, nerve injury and allergic reaction were discussed  The approach was demonstrated using models and literature was provided  Verbal and written consent was obtained  Follow-up is planned in 6m time or sooner as warranted  Discharge instructions were provided  I personally saw and examined the patient and I agree with the above discussed plan of care  History of Present Illness:    Nat Awan is a 64 y o  female who presents to [facility] for interval re-evaluation of the above stated pain complaints  The patient has a past medical and chronic pain history as outlined in the assessment section  She was last seen on 04/02/2021 at which time she was started on gabapentin 800 mg t i d  from 600 mg t i d   Since last visit, reports pain is worse  Pain score is 8/10  Pain is worse in the morning, evening, and night  Pain is constant  Pain is throbbing, cramping, pressure-like, numbness, and pins and needles  Again pain is mostly below the knees in the bilateral lower extremities  Continues to note at least 50% relief with the current regimen  Denies any significant side effects from the medication  She is reporting some difficulty walking, decreased range of motion, and joint stiffness  Since her last visit, she had reported to her PCP for ear pain was treated for this       In review, she reports that after her second back surgery she has always had pins and needles, numbness, tightness, and pressure below the knees  She is now reporting cramping and sharp pain in the bottom of the feet and pains and needles      Other than as stated above, the patient denies any interval changes in medications, medical condition, mental condition, symptoms, or allergies since the last office visit  Review of Systems:    Review of Systems   Respiratory: Negative for shortness of breath  Cardiovascular: Negative for chest pain  Gastrointestinal: Negative for constipation, diarrhea, nausea and vomiting  Musculoskeletal: Positive for gait problem and myalgias  Negative for arthralgias and joint swelling  Decreased ROM  Joint stiffness  Hand swelling   Skin: Negative for rash  Neurological: Negative for dizziness, seizures and weakness  All other systems reviewed and are negative          Patient Active Problem List   Diagnosis    Extrinsic asthma    Spinal stenosis of lumbosacral region    Irritable bowel syndrome    Borderline hypercholesterolemia    HNP (herniated nucleus pulposus), lumbar    S/P lumbar spine operation    Intramural leiomyoma of uterus    Chronic idiopathic constipation       Past Medical History:   Diagnosis Date    Abdominal pain     Arthralgia of pelvic region and thigh     Chest pain     CTS (carpal tunnel syndrome)     Fibromyalgia     Hepatitis     Leukocytopenia     Leukorrhea     Temporomandibular joint sounds on opening and/or closing the jaw        Past Surgical History:   Procedure Laterality Date    BACK SURGERY      CHOLECYSTECTOMY      FOOT SURGERY Right     INCONTINENCE SURGERY      vaginal sling for stress incontinence    [facility]    SINUS SURGERY      SPINE SURGERY  03/05/2019       Family History   Problem Relation Age of Onset    Alzheimer's disease Father     Asthma Father     Heart attack Father     Alzheimer's disease Maternal Grandmother     Lymphoma Maternal Aunt     Emphysema Maternal Grandfather        Social History     Occupational History    Occupation: homemaker   Tobacco Use    Smoking status: Never Smoker    Smokeless tobacco: Never Used   Vaping Use    Vaping Use: Never used   Substance and Sexual Activity    Alcohol use: No    Drug use: No    Sexual activity: Yes     Partners: Male     Birth control/protection: None         Current Outpatient Medications:     acetaminophen (TYLENOL) 325 mg tablet, Take 500 mg by mouth as needed for mild pain, Disp: , Rfl:     Ascorbic Acid (Vitamin C ER) 1000 MG TBCR, Take 1,000 mg by mouth, Disp: , Rfl:     Cholecalciferol 50 MCG (2000 UT) TABS, Take 2,000 Units by mouth, Disp: , Rfl:     Linzess 145 MCG CAPS, TAKE 1 CAPSULE DAILY, Disp: 90 capsule, Rfl: 3    mometasone (NASONEX) 50 mcg/act nasal spray, 2 sprays into each nostril daily, Disp: 51 g, Rfl: 3    multivitamin (THERAGRAN) TABS, Take 1 tablet by mouth daily, Disp: , Rfl:     gabapentin (NEURONTIN) 800 mg tablet, Take 1 tablet (800 mg total) by mouth 3 (three) times a day, Disp: 270 tablet, Rfl: 1    Allergies   Allergen Reactions    Other Sneezing and Nasal Congestion     SEASONAL ALLERGIES       Physical Exam:    /84   Pulse 57   Resp 18   Ht 5' (1 524 m)   Wt 66 2 kg (146 lb)   BMI 28 51 kg/m²     Constitutional:normal, well developed, well nourished, alert, in no distress and non-toxic and no overt pain behavior    Eyes:anicteric  HEENT:grossly intact  Neck:supple, symmetric, trachea midline and no masses   Pulmonary:even and unlabored  Cardiovascular:No edema or pitting edema present  Skin:Normal without rashes or lesions and well hydrated  Psychiatric:Mood and affect appropriate  Neurologic:Cranial Nerves II-XII grossly intact  Musculoskeletal:normal      Imaging  No orders to display         Orders Placed This Encounter   Procedures    Ambulatory referral to Neurology

## 2021-09-28 ENCOUNTER — CONSULT (OUTPATIENT)
Dept: NEUROLOGY | Facility: CLINIC | Age: 56
End: 2021-09-28
Payer: COMMERCIAL

## 2021-09-28 VITALS
TEMPERATURE: 96.6 F | BODY MASS INDEX: 28.51 KG/M2 | HEIGHT: 60 IN | DIASTOLIC BLOOD PRESSURE: 60 MMHG | SYSTOLIC BLOOD PRESSURE: 120 MMHG | HEART RATE: 70 BPM

## 2021-09-28 DIAGNOSIS — M79.2 NEUROPATHIC PAIN: ICD-10-CM

## 2021-09-28 DIAGNOSIS — G60.9 IDIOPATHIC SMALL FIBER PERIPHERAL NEUROPATHY: ICD-10-CM

## 2021-09-28 DIAGNOSIS — G57.30: Primary | ICD-10-CM

## 2021-09-28 PROCEDURE — 99244 OFF/OP CNSLTJ NEW/EST MOD 40: CPT | Performed by: PSYCHIATRY & NEUROLOGY

## 2021-09-28 PROCEDURE — 1036F TOBACCO NON-USER: CPT | Performed by: PSYCHIATRY & NEUROLOGY

## 2021-09-28 NOTE — PROGRESS NOTES
Consultation - Neurology   Saeed Tse 64 y o  female MRN: [id number]  Unit/Bed#:  Encounter: [id number]      Physician Requesting Consult: No att  providers found  Chief complaint :Patient is a 19-year-old right-handed very pleasant lady comes to us today with a 2 year history of painful paresthesias in both legs below the knees  HPI:    Patient is a 19-year-old lady who underwent lumbar fusion, 2 weeks later lumbar laminectomies in 2019 March and April in [place]  Subsequent to that she during recovery noticed significant burning tingling, numbness,  Tightness and paresthesias from the knees below into the feet and most recently has noticed numbness in all her toes  She has recovered well from a back surgery and denies any  Back pain or pain radiating from the back the lower extremities  She also denies any bladder bowel symptoms  her leg symptoms are predominantly  Noticeable at night when she lays and during the daytime she is able to function but has concerns about driving  Patient is followed up with pain management and has been  on gabapentin 800 mg 3 times a day which does somewhat help with her symptoms  She also has been recently detected to have osteoarthritis and was followed up with Rheumatology on a regular basis  She had EMG studies in November of both lower extremities which was essentially normal   On reviewing her labs rheumatoid factor, MYA, thyroid studies, HB A1c, Lyme titer, have all been unremarkable  She denies any weakness in the upper or lower extremities denies any spine pain in the cervical or thoracic region or upper extremity symptoms  Except for arthritic pain in her small joints of the hands  Review of Systems:  Review of Systems   Constitutional: Negative  Negative for appetite change and fever  HENT: Negative  Negative for hearing loss, tinnitus, trouble swallowing and voice change  Eyes: Negative  Negative for photophobia and pain  Respiratory: Negative  Negative for shortness of breath  Cardiovascular: Negative  Negative for palpitations  Gastrointestinal: Negative  Negative for nausea and vomiting  Endocrine: Negative  Negative for cold intolerance  Genitourinary: Negative  Negative for dysuria, frequency and urgency  Musculoskeletal: Negative  Negative for myalgias and neck pain  Skin: Negative  Negative for rash  Neurological: Positive for numbness  Negative for dizziness, tremors, seizures, syncope, facial asymmetry, speech difficulty, weakness, light-headedness and headaches  Patient states that she has bilateral leg numbness and tingling and pain  Hematological: Negative  Does not bruise/bleed easily  Psychiatric/Behavioral: Positive for sleep disturbance  Negative for confusion and hallucinations  MA review of systems was reviewed by myself      Historical Information   Past Medical History:   Diagnosis Date    Abdominal pain     Arthralgia of pelvic region and thigh     Chest pain     CTS (carpal tunnel syndrome)     Fibromyalgia     Hepatitis     Leukocytopenia     Leukorrhea     Temporomandibular joint sounds on opening and/or closing the jaw      Past Surgical History:   Procedure Laterality Date    BACK SURGERY      CHOLECYSTECTOMY      FOOT SURGERY Right     INCONTINENCE SURGERY      vaginal sling for stress incontinence    [facility]    SINUS SURGERY      SPINE SURGERY  03/05/2019     Social History   Social History     Tobacco Use   Smoking Status Never Smoker   Smokeless Tobacco Never Used     Social History     Substance and Sexual Activity   Alcohol Use No     Social History     Substance and Sexual Activity   Drug Use No       Family History:   Family History   Problem Relation Age of Onset    Alzheimer's disease Father     Asthma Father     Heart attack Father     Alzheimer's disease Maternal Grandmother     Lymphoma Maternal Aunt     Emphysema Maternal Grandfather Allergies   Allergen Reactions    Other Sneezing and Nasal Congestion     SEASONAL ALLERGIES       Meds:  All current active meds have been reviewed    Scheduled Meds:  PRN Meds:     Physical Exam:       Objective   Vitals:   Vitals:    09/28/21 0850   BP: 120/60   BP Location: Left arm   Patient Position: Sitting   Cuff Size: Adult   Pulse: 70   Temp: (!) 96 6 °F (35 9 °C)   TempSrc: Temporal   Height: 5' (1 524 m)   ,Body mass index is 28 51 kg/m²  General appearance: Cooperative in no acute distress  Head & neck head is atraumatic and normocephalic  Neck is supple with full range of motion  Cardiovascular: Carotid arteries-no carotid bruits  On examination patient is alert awake oriented, higher functions intact, speech is fluent with no evidence of any aphasia or dysarthria  Cranial nerves visual fields are full extraocular movements are intact pupils are equally reactive fundi show sharp disc margins, sensation in the V1 V2 V3 distribution is intact, no obvious facial asymmetry noted, tongue is midline, gag is adequate, hearing is preserved, shoulder shrug is adequate  On motor examination no evidence of any drift was noted, normal tone and bulk noted, deep tendon reflexes are 1+ bilaterally, strength in the upper and lower extremities is 5/5 throughout in the proximal and distal muscles  Sensory examination pinprick light touch proprioception and vibration are preserved bilaterally with mild hyperesthesia noted distally in the feet no significant sensory loss was noted  Coordination no finger-to-nose  Dysmetria was noted  Gait is normal based with a mild sway on Romberg testing  Patient has evidence of tenderness bilaterally along the lateral fibular head which is reproducible  Assessment:    Bilateral leg pain most likely secondary to peroneal neuropathy versus small fiber neuropathy  Status post lumbar fusion  Plan:     At this time after lengthy discussion with the patient regarding the possible etiology of her symptoms she was a commended to try physical therapy for her knees, to see if it helps the leg symptoms, continue gabapentin 800 mg 3 times a day, patient denies any side effects from the medication, patient was assured that her symptoms are  unlikely related to her back surgery, serum protein electrophoresis, B12 level, will be also requested as part of her neuropathy profile  If patient does not see any significant improvement upon her return will consider repeating EMG studies of the lower extremities  She will return back to see me in 3 months               9/28/2021,8:53 AM    Dictation voice to text software has been used in the creation of this document

## 2021-09-28 NOTE — LETTER
September 28, 2021     Chuy Weinstein DO  [address] [facility] [address]    Patient: Ulyses Hatchet   YOB: 1965   Date of Visit: 9/28/2021     Dear Dr Maryruth Pallas      Thank you for referring Ulyses Hatchet to me for evaluation  Below are the relevant portions of my assessment and plan of care  If you have questions, please do not hesitate to call me  I look forward to following Rosa Mcclellan along with you           Sincerely,        Priscilla French MD        CC: Spenser Pyle MD    Progress Notes:

## 2021-10-07 ENCOUNTER — HOSPITAL ENCOUNTER (OUTPATIENT)
Dept: RADIOLOGY | Facility: HOSPITAL | Age: 56
Discharge: HOME | End: 2021-10-07
Payer: COMMERCIAL

## 2021-10-07 ENCOUNTER — APPOINTMENT (OUTPATIENT)
Dept: LAB | Facility: HOSPITAL | Age: 56
End: 2021-10-07
Attending: INTERNAL MEDICINE
Payer: COMMERCIAL

## 2021-10-07 DIAGNOSIS — Z00.00 ADULT GENERAL MEDICAL EXAMINATION: ICD-10-CM

## 2021-10-07 DIAGNOSIS — G60.9 IDIOPATHIC SMALL FIBER PERIPHERAL NEUROPATHY: ICD-10-CM

## 2021-10-07 DIAGNOSIS — M15.0 PRIMARY GENERALIZED (OSTEO)ARTHRITIS: ICD-10-CM

## 2021-10-07 DIAGNOSIS — E78.00 BORDERLINE HYPERCHOLESTEROLEMIA: Chronic | ICD-10-CM

## 2021-10-07 LAB
ALBUMIN SERPL BCP-MCNC: 3.8 G/DL (ref 3.5–5)
ALP SERPL-CCNC: 106 U/L (ref 46–116)
ALT SERPL W P-5'-P-CCNC: 40 U/L (ref 12–78)
ANION GAP SERPL CALCULATED.3IONS-SCNC: 9 MMOL/L (ref 4–13)
AST SERPL W P-5'-P-CCNC: 27 U/L (ref 5–45)
BILIRUB DIRECT SERPL-MCNC: 0.11 MG/DL (ref 0–0.2)
BILIRUB SERPL-MCNC: 0.46 MG/DL (ref 0.2–1)
BUN SERPL-MCNC: 10 MG/DL (ref 5–25)
CALCIUM SERPL-MCNC: 9.3 MG/DL (ref 8.3–10.1)
CHLORIDE SERPL-SCNC: 107 MMOL/L (ref 100–108)
CHOLEST SERPL-MCNC: 238 MG/DL (ref 50–200)
CO2 SERPL-SCNC: 27 MMOL/L (ref 21–32)
CREAT SERPL-MCNC: 0.84 MG/DL (ref 0.6–1.3)
GFR SERPL CREATININE-BSD FRML MDRD: 78 ML/MIN/1.73SQ M
GLUCOSE P FAST SERPL-MCNC: 100 MG/DL (ref 65–99)
HDLC SERPL-MCNC: 61 MG/DL
LDLC SERPL CALC-MCNC: 151 MG/DL (ref 0–100)
NONHDLC SERPL-MCNC: 177 MG/DL
POTASSIUM SERPL-SCNC: 4 MMOL/L (ref 3.5–5.3)
PROT SERPL-MCNC: 7.9 G/DL (ref 6.4–8.2)
SODIUM SERPL-SCNC: 143 MMOL/L (ref 136–145)
TRIGL SERPL-MCNC: 129 MG/DL
VIT B12 SERPL-MCNC: 1043 PG/ML (ref 100–900)

## 2021-10-07 PROCEDURE — 80061 LIPID PANEL: CPT

## 2021-10-07 PROCEDURE — 80076 HEPATIC FUNCTION PANEL: CPT

## 2021-10-07 PROCEDURE — 73562 X-RAY EXAM OF KNEE 3: CPT

## 2021-10-07 PROCEDURE — 84165 PROTEIN E-PHORESIS SERUM: CPT | Performed by: PATHOLOGY

## 2021-10-07 PROCEDURE — 36415 COLL VENOUS BLD VENIPUNCTURE: CPT

## 2021-10-07 PROCEDURE — 80048 BASIC METABOLIC PNL TOTAL CA: CPT

## 2021-10-07 PROCEDURE — 82607 VITAMIN B-12: CPT

## 2021-10-07 PROCEDURE — 84165 PROTEIN E-PHORESIS SERUM: CPT

## 2021-10-08 LAB
ALBUMIN SERPL ELPH-MCNC: 4.82 G/DL (ref 3.5–5)
ALBUMIN SERPL ELPH-MCNC: 64.3 % (ref 52–65)
ALPHA1 GLOB SERPL ELPH-MCNC: 0.26 G/DL (ref 0.1–0.4)
ALPHA1 GLOB SERPL ELPH-MCNC: 3.5 % (ref 2.5–5)
ALPHA2 GLOB SERPL ELPH-MCNC: 0.59 G/DL (ref 0.4–1.2)
ALPHA2 GLOB SERPL ELPH-MCNC: 7.9 % (ref 7–13)
BETA GLOB ABNORMAL SERPL ELPH-MCNC: 0.32 G/DL (ref 0.4–0.8)
BETA1 GLOB SERPL ELPH-MCNC: 4.3 % (ref 5–13)
BETA2 GLOB SERPL ELPH-MCNC: 4.9 % (ref 2–8)
BETA2+GAMMA GLOB SERPL ELPH-MCNC: 0.37 G/DL (ref 0.2–0.5)
GAMMA GLOB ABNORMAL SERPL ELPH-MCNC: 1.13 G/DL (ref 0.5–1.6)
GAMMA GLOB SERPL ELPH-MCNC: 15.1 % (ref 12–22)
IGG/ALB SER: 1.8 {RATIO} (ref 1.1–1.8)
PROT PATTERN SERPL ELPH-IMP: ABNORMAL
PROT SERPL-MCNC: 7.5 G/DL (ref 6.4–8.2)

## 2021-10-11 ENCOUNTER — TELEPHONE (OUTPATIENT)
Dept: INTERNAL MEDICINE CLINIC | Facility: CLINIC | Age: 56
End: 2021-10-11

## 2021-10-20 ENCOUNTER — TELEPHONE (OUTPATIENT)
Dept: NEUROLOGY | Facility: CLINIC | Age: 56
End: 2021-10-20

## 2021-11-04 ENCOUNTER — APPOINTMENT (OUTPATIENT)
Dept: LAB | Facility: HOSPITAL | Age: 56
End: 2021-11-04
Payer: COMMERCIAL

## 2021-11-04 DIAGNOSIS — Z79.899 ENCOUNTER FOR LONG-TERM (CURRENT) USE OF OTHER MEDICATIONS: ICD-10-CM

## 2021-11-04 DIAGNOSIS — M15.0 PRIMARY GENERALIZED HYPERTROPHIC OSTEOARTHROSIS: Primary | ICD-10-CM

## 2021-11-04 LAB
ALBUMIN SERPL BCP-MCNC: 3.8 G/DL (ref 3.5–5)
ALP SERPL-CCNC: 96 U/L (ref 46–116)
ALT SERPL W P-5'-P-CCNC: 28 U/L (ref 12–78)
ANION GAP SERPL CALCULATED.3IONS-SCNC: 10 MMOL/L (ref 4–13)
AST SERPL W P-5'-P-CCNC: 23 U/L (ref 5–45)
BACTERIA UR QL AUTO: ABNORMAL /HPF
BASOPHILS # BLD AUTO: 0.02 THOUSANDS/ΜL (ref 0–0.1)
BASOPHILS NFR BLD AUTO: 1 % (ref 0–1)
BILIRUB SERPL-MCNC: 0.53 MG/DL (ref 0.2–1)
BILIRUB UR QL STRIP: NEGATIVE
BUN SERPL-MCNC: 12 MG/DL (ref 5–25)
CALCIUM SERPL-MCNC: 9 MG/DL (ref 8.3–10.1)
CHLORIDE SERPL-SCNC: 107 MMOL/L (ref 100–108)
CLARITY UR: CLEAR
CO2 SERPL-SCNC: 28 MMOL/L (ref 21–32)
COLOR UR: YELLOW
CREAT SERPL-MCNC: 0.85 MG/DL (ref 0.6–1.3)
EOSINOPHIL # BLD AUTO: 0.03 THOUSAND/ΜL (ref 0–0.61)
EOSINOPHIL NFR BLD AUTO: 1 % (ref 0–6)
ERYTHROCYTE [DISTWIDTH] IN BLOOD BY AUTOMATED COUNT: 12.1 % (ref 11.6–15.1)
ERYTHROCYTE [SEDIMENTATION RATE] IN BLOOD: 24 MM/HOUR (ref 0–29)
GFR SERPL CREATININE-BSD FRML MDRD: 77 ML/MIN/1.73SQ M
GLUCOSE P FAST SERPL-MCNC: 94 MG/DL (ref 65–99)
GLUCOSE UR STRIP-MCNC: NEGATIVE MG/DL
HCT VFR BLD AUTO: 38.3 % (ref 34.8–46.1)
HGB BLD-MCNC: 12.8 G/DL (ref 11.5–15.4)
HGB UR QL STRIP.AUTO: NEGATIVE
IMM GRANULOCYTES # BLD AUTO: 0.01 THOUSAND/UL (ref 0–0.2)
IMM GRANULOCYTES NFR BLD AUTO: 0 % (ref 0–2)
KETONES UR STRIP-MCNC: NEGATIVE MG/DL
LEUKOCYTE ESTERASE UR QL STRIP: NEGATIVE
LYMPHOCYTES # BLD AUTO: 1.4 THOUSANDS/ΜL (ref 0.6–4.47)
LYMPHOCYTES NFR BLD AUTO: 40 % (ref 14–44)
MCH RBC QN AUTO: 30.4 PG (ref 26.8–34.3)
MCHC RBC AUTO-ENTMCNC: 33.4 G/DL (ref 31.4–37.4)
MCV RBC AUTO: 91 FL (ref 82–98)
MONOCYTES # BLD AUTO: 0.27 THOUSAND/ΜL (ref 0.17–1.22)
MONOCYTES NFR BLD AUTO: 8 % (ref 4–12)
MUCOUS THREADS UR QL AUTO: ABNORMAL
NEUTROPHILS # BLD AUTO: 1.81 THOUSANDS/ΜL (ref 1.85–7.62)
NEUTS SEG NFR BLD AUTO: 50 % (ref 43–75)
NITRITE UR QL STRIP: NEGATIVE
NON-SQ EPI CELLS URNS QL MICRO: ABNORMAL /HPF
NRBC BLD AUTO-RTO: 0 /100 WBCS
PH UR STRIP.AUTO: 5 [PH]
PLATELET # BLD AUTO: 253 THOUSANDS/UL (ref 149–390)
PMV BLD AUTO: 10.2 FL (ref 8.9–12.7)
POTASSIUM SERPL-SCNC: 4.1 MMOL/L (ref 3.5–5.3)
PROT SERPL-MCNC: 7.5 G/DL (ref 6.4–8.2)
PROT UR STRIP-MCNC: NEGATIVE MG/DL
RBC # BLD AUTO: 4.21 MILLION/UL (ref 3.81–5.12)
RBC #/AREA URNS AUTO: ABNORMAL /HPF
SODIUM SERPL-SCNC: 145 MMOL/L (ref 136–145)
SP GR UR STRIP.AUTO: >=1.03 (ref 1–1.03)
UROBILINOGEN UR QL STRIP.AUTO: 0.2 E.U./DL
WBC # BLD AUTO: 3.54 THOUSAND/UL (ref 4.31–10.16)
WBC #/AREA URNS AUTO: ABNORMAL /HPF

## 2021-11-04 PROCEDURE — 81001 URINALYSIS AUTO W/SCOPE: CPT

## 2021-11-04 PROCEDURE — 36415 COLL VENOUS BLD VENIPUNCTURE: CPT

## 2021-11-04 PROCEDURE — 80053 COMPREHEN METABOLIC PANEL: CPT

## 2021-11-04 PROCEDURE — 85652 RBC SED RATE AUTOMATED: CPT

## 2021-11-04 PROCEDURE — 85025 COMPLETE CBC W/AUTO DIFF WBC: CPT

## 2022-03-10 ENCOUNTER — APPOINTMENT (OUTPATIENT)
Dept: LAB | Facility: CLINIC | Age: 57
End: 2022-03-10
Payer: COMMERCIAL

## 2022-03-10 ENCOUNTER — TELEPHONE (OUTPATIENT)
Dept: ADMINISTRATIVE | Facility: OTHER | Age: 57
End: 2022-03-10

## 2022-03-10 ENCOUNTER — OFFICE VISIT (OUTPATIENT)
Dept: INTERNAL MEDICINE CLINIC | Facility: CLINIC | Age: 57
End: 2022-03-10
Payer: COMMERCIAL

## 2022-03-10 VITALS
BODY MASS INDEX: 26.7 KG/M2 | WEIGHT: 136 LBS | DIASTOLIC BLOOD PRESSURE: 80 MMHG | HEIGHT: 60 IN | SYSTOLIC BLOOD PRESSURE: 112 MMHG | HEART RATE: 79 BPM | OXYGEN SATURATION: 99 %

## 2022-03-10 DIAGNOSIS — R10.9 RIGHT FLANK PAIN: ICD-10-CM

## 2022-03-10 DIAGNOSIS — R10.9 RIGHT FLANK PAIN: Primary | ICD-10-CM

## 2022-03-10 LAB
ALBUMIN SERPL BCP-MCNC: 4.2 G/DL (ref 3.5–5)
ALP SERPL-CCNC: 98 U/L (ref 46–116)
ALT SERPL W P-5'-P-CCNC: 32 U/L (ref 12–78)
ANION GAP SERPL CALCULATED.3IONS-SCNC: 4 MMOL/L (ref 4–13)
AST SERPL W P-5'-P-CCNC: 24 U/L (ref 5–45)
BACTERIA UR QL AUTO: ABNORMAL /HPF
BILIRUB DIRECT SERPL-MCNC: 0.14 MG/DL (ref 0–0.2)
BILIRUB SERPL-MCNC: 0.64 MG/DL (ref 0.2–1)
BILIRUB UR QL STRIP: NEGATIVE
BUN SERPL-MCNC: 14 MG/DL (ref 5–25)
CALCIUM SERPL-MCNC: 10.3 MG/DL (ref 8.3–10.1)
CHLORIDE SERPL-SCNC: 108 MMOL/L (ref 100–108)
CLARITY UR: ABNORMAL
CO2 SERPL-SCNC: 28 MMOL/L (ref 21–32)
COLOR UR: ABNORMAL
CREAT SERPL-MCNC: 0.9 MG/DL (ref 0.6–1.3)
ERYTHROCYTE [DISTWIDTH] IN BLOOD BY AUTOMATED COUNT: 12.2 % (ref 11.6–15.1)
GFR SERPL CREATININE-BSD FRML MDRD: 71 ML/MIN/1.73SQ M
GLUCOSE P FAST SERPL-MCNC: 88 MG/DL (ref 65–99)
GLUCOSE UR STRIP-MCNC: NEGATIVE MG/DL
HCT VFR BLD AUTO: 39.1 % (ref 34.8–46.1)
HGB BLD-MCNC: 13.7 G/DL (ref 11.5–15.4)
HGB UR QL STRIP.AUTO: NEGATIVE
HYALINE CASTS #/AREA URNS LPF: ABNORMAL /LPF
KETONES UR STRIP-MCNC: NEGATIVE MG/DL
LEUKOCYTE ESTERASE UR QL STRIP: ABNORMAL
MCH RBC QN AUTO: 30.2 PG (ref 26.8–34.3)
MCHC RBC AUTO-ENTMCNC: 35 G/DL (ref 31.4–37.4)
MCV RBC AUTO: 86 FL (ref 82–98)
MUCOUS THREADS UR QL AUTO: ABNORMAL
NITRITE UR QL STRIP: NEGATIVE
NON-SQ EPI CELLS URNS QL MICRO: ABNORMAL /HPF
PH UR STRIP.AUTO: 5.5 [PH]
PLATELET # BLD AUTO: 261 THOUSANDS/UL (ref 149–390)
PMV BLD AUTO: 10.9 FL (ref 8.9–12.7)
POTASSIUM SERPL-SCNC: 4 MMOL/L (ref 3.5–5.3)
PROT SERPL-MCNC: 8.3 G/DL (ref 6.4–8.2)
PROT UR STRIP-MCNC: ABNORMAL MG/DL
RBC # BLD AUTO: 4.53 MILLION/UL (ref 3.81–5.12)
RBC #/AREA URNS AUTO: ABNORMAL /HPF
SODIUM SERPL-SCNC: 140 MMOL/L (ref 136–145)
SP GR UR STRIP.AUTO: 1.03 (ref 1–1.03)
UROBILINOGEN UR STRIP-ACNC: <2 MG/DL
WBC # BLD AUTO: 3.45 THOUSAND/UL (ref 4.31–10.16)
WBC #/AREA URNS AUTO: ABNORMAL /HPF

## 2022-03-10 PROCEDURE — 81001 URINALYSIS AUTO W/SCOPE: CPT

## 2022-03-10 PROCEDURE — 80048 BASIC METABOLIC PNL TOTAL CA: CPT

## 2022-03-10 PROCEDURE — 85027 COMPLETE CBC AUTOMATED: CPT

## 2022-03-10 PROCEDURE — 36415 COLL VENOUS BLD VENIPUNCTURE: CPT

## 2022-03-10 PROCEDURE — 99214 OFFICE O/P EST MOD 30 MIN: CPT | Performed by: INTERNAL MEDICINE

## 2022-03-10 PROCEDURE — 3725F SCREEN DEPRESSION PERFORMED: CPT | Performed by: INTERNAL MEDICINE

## 2022-03-10 PROCEDURE — 80076 HEPATIC FUNCTION PANEL: CPT

## 2022-03-10 RX ORDER — DICYCLOMINE HCL 20 MG
20 TABLET ORAL EVERY 6 HOURS
Qty: 60 TABLET | Refills: 0 | Status: SHIPPED | OUTPATIENT
Start: 2022-03-10

## 2022-03-10 NOTE — TELEPHONE ENCOUNTER
Upon review of the In Basket request we were able to locate, review, and update the patient chart as requested for Mammogram     Any additional questions or concerns should be emailed to the Practice Liaisons via [email or web address] email, please do not reply via In Basket      Thank you  Fawad Soto

## 2022-03-10 NOTE — TELEPHONE ENCOUNTER
----- Message from Dwayne Cortes sent at 3/10/2022  8:46 AM EST -----  Regarding: mammo  03/10/22 8:46 AM    Hello, our patient Maryann Ro has had Mammogram completed/performed  Please assist in updating the patient chart by pulling the Care Everywhere (CE) document  The date of service is 2021       Thank you,  Dwayne Contreras 64

## 2022-03-10 NOTE — PATIENT INSTRUCTIONS
Abdominal Pain   WHAT YOU NEED TO KNOW:   Abdominal pain can be dull, achy, or sharp  You may have pain in one area of your abdomen, or in your entire abdomen  Your pain may be caused by a condition such as constipation, food sensitivity or poisoning, infection, or a blockage  Abdominal pain can also be from a hernia, appendicitis, or an ulcer  Liver, gallbladder, or kidney conditions can also cause abdominal pain  The cause of your abdominal pain may not be known  DISCHARGE INSTRUCTIONS:   Call your local emergency number (911 in the [address] [facility]) if:   · You have new chest pain or shortness of breath  Return to the emergency department if:   · You have pulsing pain in your upper abdomen or lower back that suddenly becomes constant  · Your pain is in the right lower abdominal area and worsens with movement  · You have a fever over 100 4°F (38°C) or shaking chills  · You are vomiting and cannot keep food or liquids down  · Your pain does not improve or gets worse over the next 8 to 12 hours  · You see blood in your vomit or bowel movements, or they look black and tarry  · Your skin or the whites of your eyes turn yellow  · You are a woman and have a large amount of vaginal bleeding that is not your monthly period  Call your doctor if:   · You have pain in your lower back  · You are a man and have pain in your testicles  · You have pain when you urinate  · You have questions or concerns about your condition or care  Medicines:   · Prescription pain medicine  may be given  Ask your healthcare provider how to take this medicine safely  Some prescription pain medicines contain acetaminophen  Do not take other medicines that contain acetaminophen without talking to your healthcare provider  Too much acetaminophen may cause liver damage  Prescription pain medicine may cause constipation  Ask your healthcare provider how to prevent or treat constipation       · Medicines  may be given to calm your stomach or prevent vomiting  · Take your medicine as directed  Contact your healthcare provider if you think your medicine is not helping or if you have side effects  Tell him of her if you are allergic to any medicine  Keep a list of the medicines, vitamins, and herbs you take  Include the amounts, and when and why you take them  Bring the list or the pill bottles to follow-up visits  Carry your medicine list with you in case of an emergency  Manage your symptoms:   · Apply heat  on your abdomen for 20 to 30 minutes every 2 hours for as many days as directed  Heat helps decrease pain and muscle spasms  · Make changes to the food you eat, if needed  Do not eat foods that cause abdominal pain or other symptoms  Eat small meals more often  The following changes may also help:    ? Eat more high-fiber foods if you are constipated  High-fiber foods include fruits, vegetables, whole-grain foods, and legumes  ? Do not eat foods that cause gas if you have bloating  Examples include broccoli, cabbage, and cauliflower  Do not drink soda or carbonated drinks  These may also cause gas  ? Do not eat foods or drinks that contain sorbitol or fructose if you have diarrhea and bloating  Some examples are fruit juices, candy, jelly, and sugar-free gum  ? Do not eat high-fat foods  Examples include fried foods, cheeseburgers, hot dogs, and desserts  ? Limit or do not have caffeine  Caffeine may make symptoms such as heartburn or nausea worse  ? Drink more liquids to prevent dehydration from diarrhea or vomiting  Ask your healthcare provider how much liquid to drink each day and which liquids are best for you  · Keep a diary of your abdominal pain  A diary may help your healthcare provider learn what is causing your abdominal pain  Include when the pain happens, how long it lasts, and what the pain feels like  Write down any other symptoms you have with abdominal pain   Also write down what you eat, and what symptoms you have after you eat  · Manage your stress  Stress may cause abdominal pain  Your healthcare provider may recommend relaxation techniques and deep breathing exercises to help decrease your stress  Your healthcare provider may recommend you talk to someone about your stress or anxiety, such as a counselor or a trusted friend  Get plenty of sleep and exercise regularly  · Limit or do not drink alcohol  Alcohol can make your abdominal pain worse  Ask your healthcare provider if it is safe for you to drink alcohol  Also ask how much is safe for you to drink  · Do not smoke  Nicotine and other chemicals in cigarettes can damage your esophagus and stomach  Ask your healthcare provider for information if you currently smoke and need help to quit  E-cigarettes or smokeless tobacco still contain nicotine  Talk to your healthcare provider before you use these products  Follow up with your doctor within 24 hours or as directed:  Write down your questions so you remember to ask them during your visits  © [facility] 2022 Information is for End User's use only and may not be sold, redistributed or otherwise used for commercial purposes  All illustrations and images included in CareNotes® are the copyrighted property of [employer]  or [facility] Yayo Noriega   The above information is an  only  It is not intended as medical advice for individual conditions or treatments  Talk to your doctor, nurse or pharmacist before following any medical regimen to see if it is safe and effective for you

## 2022-03-10 NOTE — PROGRESS NOTES
[facility]    NAME: Clementina Tafoya  AGE: 64 y o  SEX: female  : 1965     DATE: 3/10/2022     Assessment and Plan:     1  Right flank pain    Check further lab work  Pain was superficial on exam and could be musculoskeletal in origin  I could not palpate any discrete mass  Check ultrasound  Discussed NSAIDs and heat/ice prn  Can try bentyl prn     - UA w Reflex to Microscopic w Reflex to Culture -Lab Collect; Future  - CBC; Future  - Basic metabolic panel; Future  - dicyclomine (BENTYL) 20 mg tablet; Take 1 tablet (20 mg total) by mouth every 6 (six) hours  Dispense: 60 tablet; Refill: 0  - Hepatic function panel; Future  - US abdomen complete; Future           Chief Complaint:     Chief Complaint   Patient presents with    Abdominal Pain      History of Present Illness:       Tyler Valenzuela presents for f/u  Having some increased abdominal pain right upper abdomen/right flank  Previously has had gallbladder removed  Has underlying fatty liver  Feels like there could be a lump there  No specific foods bother the pain  Movement bothers the pain  Noticed it first on Friday  No nausea, vomiting, fever, chills  Review of Systems:     Review of Systems   Constitutional: Positive for appetite change  Negative for chills, fatigue and fever  Respiratory: Negative  Cardiovascular: Negative  Gastrointestinal: Positive for abdominal pain  Objective:     /80 (BP Location: Left arm, Patient Position: Sitting, Cuff Size: Standard)   Pulse 79   Ht 5' (1 524 m)   Wt 61 7 kg (136 lb)   SpO2 99%   BMI 26 56 kg/m²     Physical Exam  Constitutional:       General: She is not in acute distress  Appearance: She is not ill-appearing  Cardiovascular:      Rate and Rhythm: Normal rate and regular rhythm  Heart sounds: No murmur heard  Pulmonary:      Effort: Pulmonary effort is normal  No respiratory distress  Breath sounds:  No wheezing  Abdominal:      General: Bowel sounds are normal  There is no distension  Palpations: There is no mass  Tenderness: There is abdominal tenderness (superficial right flank)  Musculoskeletal:      Right lower leg: No edema  Left lower leg: No edema  Neurological:      Mental Status: She is alert         Lisa Pool DO  [facility] [address] [place] [address]

## 2022-03-18 ENCOUNTER — HOSPITAL ENCOUNTER (OUTPATIENT)
Dept: ULTRASOUND IMAGING | Facility: HOSPITAL | Age: 57
Discharge: HOME | End: 2022-03-18
Attending: INTERNAL MEDICINE
Payer: COMMERCIAL

## 2022-03-18 DIAGNOSIS — R10.9 RIGHT FLANK PAIN: ICD-10-CM

## 2022-03-18 PROCEDURE — 76700 US EXAM ABDOM COMPLETE: CPT

## 2022-03-25 NOTE — PROGRESS NOTES
Pain Medicine Follow-Up Note    Assessment:  1  Neuropathy    2  Chronic pain syndrome    3  Lumbar post-laminectomy syndrome    4  Lumbar radiculopathy        Plan:  No orders of the defined types were placed in this encounter  New Medications Ordered This Visit   Medications    gabapentin (NEURONTIN) 800 mg tablet     Sig: Take 1 tablet (800 mg total) by mouth 3 (three) times a day     Dispense:  270 tablet     Refill:  1    meloxicam (MOBIC) 7 5 mg tablet     Sig: Take 1 tablet (7 5 mg total) by mouth daily as needed for moderate pain     Dispense:  30 tablet     Refill:  0       My impressions and treatment recommendations were discussed in detail with the patient who verbalized understanding and had no further questions  This is a 77-year-old female with small fiber neuropathy and lumbar post-laminectomy syndrome with complaints as noted below  She continues gabapentin 8 0 mg t i d  with continued improvement in pain and function without any significant side effects  Also requesting anti-inflammatory  Ordered meloxicam 7 5 mg daily p r n       We had a discussion about whether her neuropathic symptoms below the knees are result of her previous back surgery  As was discussed with Neurology, does not appear that is directly related to the surgery itself  Differential diagnosis per Neurology include small fiber neuropathy  Encouraged her to keep neurology follow-up as she may need repeat EMG  Metabolically, no abnormalities in regards to neuropathy  She also has been found to have hepatomegaly due to fatty liver disease  She will be seeing a specialist soon  Discussion regarding gabapentin, as she is concerned with using this with liver disease  Explained to the patient gabapentin is selection through the kidney with minimal metabolism in the liver  There are very few reports of gabapentin induce hepatic toxicity    She will discuss this further with her specialist, however at this juncture I feel it is safe to continue the patient on this medication  [place] Prescription Drug Monitoring Program report was reviewed and was appropriate     Follow-up is planned in 6 months time or sooner as warranted  Discharge instructions were provided  I personally saw and examined the patient and I agree with the above discussed plan of care  History of Present Illness:    Mariajose Denney is a 64 y o  female who presents to [facility] for interval re-evaluation of the above stated pain complaints  The patient has a past medical and chronic pain history as outlined in the assessment section  She was last seen on 09/27/21 in regards to chronic pain syndrome  Since last visit had ultrasound showing fatty liver and will see he a hepatologist  No hospitalizations  Got Bentyl from PCP with some minor relief  Current medications include gabapentin 800mg TID  Reports continued improvement of 50% without any side effects  In review, she reports that after her second back surgery she has always had pins and needles, numbness, tightness, and pressure below the knees  Shecontinues to report cramping and sharp pain in the bottom of the feet and pains and needles  sTILL CONTINUES TO  Have same symptoms , 7/10 pain that is same     Other than as stated above, the patient denies any interval changes in medications, medical condition, mental condition, symptoms, or allergies since the last office visit  Review of Systems:    Review of Systems   Respiratory: Negative for shortness of breath  Cardiovascular: Negative for chest pain  Gastrointestinal: Negative for constipation, diarrhea, nausea and vomiting  Musculoskeletal: Negative for arthralgias, gait problem, joint swelling and myalgias  Decreased ROM   Skin: Negative for rash  Neurological: Negative for dizziness, seizures and weakness  All other systems reviewed and are negative          Patient Active Problem List   Diagnosis    Extrinsic asthma    Spinal stenosis of lumbosacral region    Irritable bowel syndrome    Borderline hypercholesterolemia    HNP (herniated nucleus pulposus), lumbar    S/P lumbar spine operation    Intramural leiomyoma of uterus    Chronic idiopathic constipation       Past Medical History:   Diagnosis Date    Abdominal pain     Arthralgia of pelvic region and thigh     Chest pain     CTS (carpal tunnel syndrome)     Fibromyalgia     Hepatitis     Leukocytopenia     Leukorrhea     Temporomandibular joint sounds on opening and/or closing the jaw        Past Surgical History:   Procedure Laterality Date    BACK SURGERY      CHOLECYSTECTOMY      FOOT SURGERY Right     INCONTINENCE SURGERY      vaginal sling for stress incontinence    [facility]/2019    [facility]    SINUS SURGERY      SPINE SURGERY  03/05/2019       Family History   Problem Relation Age of Onset    Alzheimer's disease Father     Asthma Father     Heart attack Father     Alzheimer's disease Maternal Grandmother     Lymphoma Maternal Aunt     Emphysema Maternal Grandfather        Social History     Occupational History    Occupation: [employer]   Tobacco Use    Smoking status: Never Smoker    Smokeless tobacco: Never Used   Vaping Use    Vaping Use: Never used   Substance and Sexual Activity    Alcohol use: No    Drug use: No    Sexual activity: Yes     Partners: Male     Birth control/protection: None         Current Outpatient Medications:     acetaminophen (TYLENOL) 325 mg tablet, Take 500 mg by mouth as needed for mild pain, Disp: , Rfl:     Ascorbic Acid (Vitamin C ER) 1000 MG TBCR, Take 1,000 mg by mouth, Disp: , Rfl:     Cholecalciferol 50 MCG (2000 UT) TABS, Take 2,000 Units by mouth, Disp: , Rfl:     dicyclomine (BENTYL) 20 mg tablet, Take 1 tablet (20 mg total) by mouth every 6 (six) hours, Disp: 60 tablet, Rfl: 0    gabapentin (NEURONTIN) 800 mg tablet, Take 1 tablet (800 mg total) by mouth 3 (three) times a day, Disp: 270 tablet, Rfl: 1    Linzess 145 MCG CAPS, TAKE 1 CAPSULE DAILY, Disp: 90 capsule, Rfl: 3    mometasone (NASONEX) 50 mcg/act nasal spray, 2 sprays into each nostril daily, Disp: 51 g, Rfl: 3    multivitamin (THERAGRAN) TABS, Take 1 tablet by mouth daily, Disp: , Rfl:     meloxicam (MOBIC) 7 5 mg tablet, Take 1 tablet (7 5 mg total) by mouth daily as needed for moderate pain, Disp: 30 tablet, Rfl: 0    Allergies   Allergen Reactions    Other Sneezing and Nasal Congestion     SEASONAL ALLERGIES       Physical Exam:    /67   Pulse 61   Resp 18   Ht 5' (1 524 m)   Wt 61 5 kg (135 lb 9 6 oz)   BMI 26 48 kg/m²     Constitutional:normal, well developed, well nourished, alert, in no distress and non-toxic and no overt pain behavior  Eyes:anicteric  HEENT:grossly intact  Neck:supple, symmetric, trachea midline and no masses   Pulmonary:even and unlabored  Cardiovascular:No edema or pitting edema present  Skin:Normal without rashes or lesions and well hydrated  Psychiatric:Mood and affect appropriate  Neurologic:Cranial Nerves II-XII grossly intact  Musculoskeletal:normal      Imaging  No orders to display         No orders of the defined types were placed in this encounter

## 2022-03-28 ENCOUNTER — OFFICE VISIT (OUTPATIENT)
Dept: PAIN MEDICINE | Facility: CLINIC | Age: 57
End: 2022-03-28
Payer: COMMERCIAL

## 2022-03-28 VITALS
WEIGHT: 135.6 LBS | RESPIRATION RATE: 18 BRPM | HEART RATE: 61 BPM | HEIGHT: 60 IN | DIASTOLIC BLOOD PRESSURE: 67 MMHG | BODY MASS INDEX: 26.62 KG/M2 | SYSTOLIC BLOOD PRESSURE: 121 MMHG

## 2022-03-28 DIAGNOSIS — M96.1 LUMBAR POST-LAMINECTOMY SYNDROME: ICD-10-CM

## 2022-03-28 DIAGNOSIS — M54.16 LUMBAR RADICULOPATHY: ICD-10-CM

## 2022-03-28 DIAGNOSIS — G62.9 NEUROPATHY: Primary | ICD-10-CM

## 2022-03-28 DIAGNOSIS — G89.4 CHRONIC PAIN SYNDROME: ICD-10-CM

## 2022-03-28 PROCEDURE — 1036F TOBACCO NON-USER: CPT | Performed by: STUDENT IN AN ORGANIZED HEALTH CARE EDUCATION/TRAINING PROGRAM

## 2022-03-28 PROCEDURE — 99214 OFFICE O/P EST MOD 30 MIN: CPT | Performed by: STUDENT IN AN ORGANIZED HEALTH CARE EDUCATION/TRAINING PROGRAM

## 2022-03-28 RX ORDER — GABAPENTIN 800 MG/1
800 TABLET ORAL 3 TIMES DAILY
Qty: 270 TABLET | Refills: 1 | Status: SHIPPED | OUTPATIENT
Start: 2022-03-28

## 2022-03-28 RX ORDER — MELOXICAM 7.5 MG/1
7.5 TABLET ORAL DAILY PRN
Qty: 30 TABLET | Refills: 0 | Status: SHIPPED | OUTPATIENT
Start: 2022-03-28

## 2022-05-04 ENCOUNTER — TELEPHONE (OUTPATIENT)
Dept: NEUROLOGY | Facility: CLINIC | Age: 57
End: 2022-05-04

## 2022-05-04 NOTE — TELEPHONE ENCOUNTER
I called and spoke to patient rescheduled  appointment with [facility] for numbness no new symptoms

## 2022-05-10 ENCOUNTER — TELEPHONE (OUTPATIENT)
Dept: NEUROLOGY | Facility: CLINIC | Age: 57
End: 2022-05-10

## 2022-05-27 ENCOUNTER — OFFICE VISIT (OUTPATIENT)
Dept: GASTROENTEROLOGY | Facility: CLINIC | Age: 57
End: 2022-05-27
Payer: COMMERCIAL

## 2022-05-27 VITALS
WEIGHT: 135 LBS | HEIGHT: 60 IN | TEMPERATURE: 98.1 F | SYSTOLIC BLOOD PRESSURE: 133 MMHG | BODY MASS INDEX: 26.5 KG/M2 | HEART RATE: 77 BPM | DIASTOLIC BLOOD PRESSURE: 72 MMHG

## 2022-05-27 DIAGNOSIS — K76.0 FATTY LIVER: ICD-10-CM

## 2022-05-27 PROCEDURE — 1036F TOBACCO NON-USER: CPT | Performed by: INTERNAL MEDICINE

## 2022-05-27 PROCEDURE — 3008F BODY MASS INDEX DOCD: CPT | Performed by: INTERNAL MEDICINE

## 2022-05-27 PROCEDURE — 99204 OFFICE O/P NEW MOD 45 MIN: CPT | Performed by: INTERNAL MEDICINE

## 2022-05-27 NOTE — PROGRESS NOTES
[address] [facility] - Outpatient Consultation  Óscaryl Number 62 y o  female MRN: [id number]  Encounter: [id number]    PCP:  Ryann Lee, [facility], [phone]  Referring Provider:  Abeba Macias DO, [phone]        ASSESSMENT AND PLAN:    Fatty Appearing Liver on Imaging:  No physical, laboratory or radiologic evidence of cirrhosis/portal hypertension  I discussed with Ms Monserrat Mays the natural history of fatty liver disease, including risks for development, and risk for progression to cirrhosis and its complications  We reviewed that there are no current medications that are FDA approved for the specific management of fatty liver disease  I explained that there are many, many compounds (both new and medications currently used for other indications) that are currently being studied to treat several aspects of fatty liver disease, including decreasing hepatic fat, hepatic inflammation, hepatic fibrosis, as well reducing risk factors for the development of fatty liver (primarily through weight loss)  I explained that even if liver enzymes are normal, it does not rule out low levels of active fatty inflammation in the liver  She does not have risk factors for other chronic liver diseases and given normal LFTs, I will not request extensive blood work to evaluate for them  NAFLD Fibrosis Score is a non-invasive calculation based on patient characteristics and basic routine labs (Age, BMI, AST, ALT, Platelet count, Albumin and presence/absence of insulin resistance)  Ms Jean Mathur NAFLD Fibrosis score is -2 51  Scores less than -1 455 correlate well with absence of advanced fibrosis, with a high negative predictive value  I have requested lab based MORENO Fibrosure and abdominal ultrasound elastography with fat quantification (UGAP) to further assess hepatic fibrosis and steatosis in a non-invasive manner  I counseled Ms Monserrat Mays on the importance of weight loss through lifestyle modification, primary diet and exercise  Ms Porfirio Bobby will have the testing done as outlined above and follow-up in 6 months  FOLLOW-UP:  Return in about 6 months (around 11/27/2022)  VISIT DIAGNOSES AND ORDERS:      1  Fatty liver      Orders Placed This Encounter   Procedures    US elastography    MORENO Fibrosure    Hepatic function panel     ______________________________________________________________________    HPI:  Ms Kirill Keita is a 62 y o  female with medical history as outlined below, including HLD, chronic constipation on Linzess, neuropathy post back surgery (on Mobic and gabapentin), who comes in today for initial consultation for history of fatty appearing liver on imaging  She states she has had a fatty appearing liver since her 25s  She believes she has had elevated transaminases in her early 35s, but she did not require a work-up at that time, and subsequently had normal LFTs  She denies a known family history of liver disease  She denies alcohol use  She was tested for viral hepatitis in 2019  She has had borderline blood pressure, but has not required medical management  She was never diagnosed with diabetes  Her peak weak was 156 lbs  Ms Porfirio Bobby denies recent or history of yellow eyes/skin, dark urine, GI bleeding, abdominal distention with fluid, lower extremity swelling, easy bruising, excessive bleeding, pruritus or confusion  She denies abdomina pain (now), nausea, vomiting, heartburn, reflux, difficulty swallowing, early satiety, diarrhea  She does have chronic constipation and gets a bloated sensation when it has been a few days without having a bowel movement  REVIEW OF SYSTEMS:    Review of Systems   Constitutional: Negative for activity change, fatigue, fever and unexpected weight change  HENT: Negative for nosebleeds, sore throat and trouble swallowing  Eyes: Negative for pain and visual disturbance  Respiratory: Negative  Negative for chest tightness and shortness of breath  Cardiovascular: Negative for chest pain, palpitations and leg swelling  Gastrointestinal: Negative for abdominal distention, abdominal pain, blood in stool, diarrhea and nausea  Endocrine: Negative  Negative for polydipsia and polyuria  Genitourinary: Negative  Negative for difficulty urinating, dysuria and frequency  Musculoskeletal: Positive for back pain  Negative for arthralgias  Skin: Positive for color change  Negative for rash  Allergic/Immunologic: Negative  Negative for food allergies  Neurological: Negative  Negative for dizziness, weakness, light-headedness and numbness  Neuropathic pain below knees   Hematological: Does not bruise/bleed easily  Psychiatric/Behavioral: Negative for sleep disturbance  The patient is not nervous/anxious            Historical Information   Patient Active Problem List   Diagnosis    Extrinsic asthma    Spinal stenosis of lumbosacral region    Irritable bowel syndrome    Borderline hypercholesterolemia    HNP (herniated nucleus pulposus), lumbar    S/P lumbar spine operation    Intramural leiomyoma of uterus    Chronic idiopathic constipation     Past Medical History:   Diagnosis Date    Abdominal pain     Arthralgia of pelvic region and thigh     Chest pain     CTS (carpal tunnel syndrome)     Fibromyalgia     Hepatitis     Leukocytopenia     Leukorrhea     Temporomandibular joint sounds on opening and/or closing the jaw      Past Surgical History:   Procedure Laterality Date    BACK SURGERY      CHOLECYSTECTOMY      FOOT SURGERY Right     INCONTINENCE SURGERY      vaginal sling for stress incontinence    [facility]    SINUS SURGERY      SPINE SURGERY  03/05/2019     Social History   Social History     Substance and Sexual Activity   Alcohol Use No     Social History     Substance and Sexual Activity   Drug Use No     Social History     Tobacco Use Smoking Status Never Smoker   Smokeless Tobacco Never Used     Family History   Problem Relation Age of Onset    Alzheimer's disease Father     Asthma Father     Heart attack Father     Alzheimer's disease Maternal Grandmother     Lymphoma Maternal Aunt     Emphysema Maternal Grandfather        Meds/Allergies       Current Outpatient Medications:     acetaminophen (TYLENOL) 325 mg tablet    Ascorbic Acid (Vitamin C ER) 1000 MG TBCR    Cholecalciferol 50 MCG (2000 UT) TABS    dicyclomine (BENTYL) 20 mg tablet    gabapentin (NEURONTIN) 800 mg tablet    Linzess 145 MCG CAPS    meloxicam (MOBIC) 7 5 mg tablet    mometasone (NASONEX) 50 mcg/act nasal spray    multivitamin (THERAGRAN) TABS    Allergies   Allergen Reactions    Other Sneezing and Nasal Congestion     SEASONAL ALLERGIES           Objective     Blood pressure 133/72, pulse 77, temperature 98 1 °F (36 7 °C), temperature source Tympanic, height 5' (1 524 m), weight 61 2 kg (135 lb)  Body mass index is 26 37 kg/m²  PHYSICAL EXAM:           Physical Exam         Lab Results:   Lab Results   Component Value Date     06/17/2015    K 4 0 03/10/2022    CO2 28 03/10/2022     03/10/2022    BUN 14 03/10/2022    CREATININE 0 90 03/10/2022    GLUCOSE 94 06/17/2015     Lab Results   Component Value Date    WBC 3 45 (L) 03/10/2022    HGB 13 7 03/10/2022    HCT 39 1 03/10/2022    MCV 86 03/10/2022     03/10/2022     Lab Results   Component Value Date    TP 8 3 (H) 03/10/2022    AST 24 03/10/2022    ALT 32 03/10/2022    BILITOT 0 6 10/17/2014    BILIDIR 0 14 03/10/2022    INR 0 95 04/02/2019      No results found for: AFP, IRON, LABIRON, FERRITIN  Lab Results   Component Value Date    CHOL 191 08/29/2014    HDL 61 10/07/2021    TRIG 129 10/07/2021         Radiology Results:   I have reviewed the results of any radiology studies performed within the last 90 days  This includes:      No results found        Vick Denny MD  Hepatology/Gastroenterology

## 2022-05-27 NOTE — PATIENT INSTRUCTIONS
Have the blood work done at your earliest convenience  Have the special ultrasound done (elastography)  Eat healthy, remain active  Walk often

## 2022-06-08 ENCOUNTER — HOSPITAL ENCOUNTER (OUTPATIENT)
Dept: ULTRASOUND IMAGING | Facility: HOSPITAL | Age: 57
Discharge: HOME | End: 2022-06-08
Attending: INTERNAL MEDICINE
Payer: COMMERCIAL

## 2022-06-08 ENCOUNTER — APPOINTMENT (OUTPATIENT)
Dept: LAB | Facility: HOSPITAL | Age: 57
End: 2022-06-08
Payer: COMMERCIAL

## 2022-06-08 DIAGNOSIS — K76.0 FATTY LIVER: ICD-10-CM

## 2022-06-08 LAB
ALBUMIN SERPL BCP-MCNC: 4.1 G/DL (ref 3.5–5)
ALP SERPL-CCNC: 93 U/L (ref 46–116)
ALT SERPL W P-5'-P-CCNC: 27 U/L (ref 12–78)
AST SERPL W P-5'-P-CCNC: 27 U/L (ref 5–45)
BILIRUB DIRECT SERPL-MCNC: 0.14 MG/DL (ref 0–0.2)
BILIRUB SERPL-MCNC: 0.61 MG/DL (ref 0.2–1)
PROT SERPL-MCNC: 8.1 G/DL (ref 6.4–8.2)

## 2022-06-08 PROCEDURE — 83010 ASSAY OF HAPTOGLOBIN QUANT: CPT

## 2022-06-08 PROCEDURE — 76981 USE PARENCHYMA: CPT

## 2022-06-08 PROCEDURE — 84450 TRANSFERASE (AST) (SGOT): CPT

## 2022-06-08 PROCEDURE — 82977 ASSAY OF GGT: CPT

## 2022-06-08 PROCEDURE — 82465 ASSAY BLD/SERUM CHOLESTEROL: CPT

## 2022-06-08 PROCEDURE — 80076 HEPATIC FUNCTION PANEL: CPT

## 2022-06-08 PROCEDURE — 82247 BILIRUBIN TOTAL: CPT

## 2022-06-08 PROCEDURE — 82172 ASSAY OF APOLIPOPROTEIN: CPT

## 2022-06-08 PROCEDURE — 82947 ASSAY GLUCOSE BLOOD QUANT: CPT

## 2022-06-08 PROCEDURE — 36415 COLL VENOUS BLD VENIPUNCTURE: CPT

## 2022-06-08 PROCEDURE — 84478 ASSAY OF TRIGLYCERIDES: CPT

## 2022-06-08 PROCEDURE — 84460 ALANINE AMINO (ALT) (SGPT): CPT

## 2022-06-08 PROCEDURE — 83883 ASSAY NEPHELOMETRY NOT SPEC: CPT

## 2022-06-10 LAB
A2 MACROGLOB SERPL-MCNC: 189 MG/DL (ref 110–276)
ALT SERPL W P-5'-P-CCNC: 24 IU/L (ref 0–40)
APO A-I SERPL-MCNC: 185 MG/DL (ref 116–209)
AST SERPL W P-5'-P-CCNC: 31 IU/L (ref 0–40)
BILIRUB SERPL-MCNC: 0.5 MG/DL (ref 0–1.2)
CHOLEST SERPL-MCNC: 253 MG/DL (ref 100–199)
FIBROSIS SCORING:: ABNORMAL
FIBROSIS STAGE SERPL QL: ABNORMAL
GGT SERPL-CCNC: 25 IU/L (ref 0–60)
GLUCOSE SERPL-MCNC: 90 MG/DL (ref 65–99)
HAPTOGLOB SERPL-MCNC: 90 MG/DL (ref 33–346)
LABORATORY COMMENT REPORT: ABNORMAL
LIVER FIBR SCORE SERPL CALC.FIBROSURE: 0.14 (ref 0–0.21)
NECROINFLAMMATORY ACT GRADE SERPL QL: ABNORMAL
NECROINFLAMMATORY ACT SCORE SERPL: 0.25
SERVICE CMNT-IMP: ABNORMAL
SL AMB INTERPRETATION: ABNORMAL
SL AMB NASH SCORING: ABNORMAL
SL AMB STEATOSIS GRADE: ABNORMAL
SL AMB STEATOSIS SCORE: 0.31 (ref 0–0.3)
STEATOSIS GRADING: ABNORMAL
TRIGL SERPL-MCNC: 102 MG/DL (ref 0–149)

## 2022-06-13 PROBLEM — M15.0 PRIMARY GENERALIZED (OSTEO)ARTHRITIS: Status: ACTIVE | Noted: 2022-06-13

## 2022-06-13 PROBLEM — G62.9 SMALL FIBER NEUROPATHY: Status: ACTIVE | Noted: 2022-06-13

## 2022-06-13 PROBLEM — K76.0 FATTY LIVER: Status: ACTIVE | Noted: 2022-06-13

## 2022-06-13 PROBLEM — M18.0 PRIMARY OSTEOARTHRITIS OF BOTH FIRST CARPOMETACARPAL JOINTS: Status: ACTIVE | Noted: 2022-06-13

## 2022-08-18 ENCOUNTER — OFFICE VISIT (OUTPATIENT)
Dept: NEUROLOGY | Facility: CLINIC | Age: 57
End: 2022-08-18

## 2022-08-18 VITALS
BODY MASS INDEX: 25.52 KG/M2 | WEIGHT: 130 LBS | HEIGHT: 60 IN | OXYGEN SATURATION: 99 % | DIASTOLIC BLOOD PRESSURE: 72 MMHG | HEART RATE: 64 BPM | SYSTOLIC BLOOD PRESSURE: 130 MMHG

## 2022-08-18 DIAGNOSIS — R20.2 PARESTHESIAS: Primary | ICD-10-CM

## 2022-08-18 NOTE — PROGRESS NOTES
Patient ID: Von Mccabe is a 62 y o  female  Assessment/Plan:    Paresthesias  Von Mccabe is a 62year old female known to the practice for painful paresthesias in bilateral legs from the level of below the knees down since her lumbar laminectomies (March 2019 with revision in April 2019)  She has had a thorough autoimmune workup with no cause identified and EMG last performed December 2020 was normal   She at baseline has chronic pain 6/10 despite gabapentin 800 mg 3 times a day and meloxicam as needed  Her exam is excellent today and there is no motor, strength  or sensory deficits, arguing against small fiber neuropathy or other neuromuscular condition  We discussed today given she is not having any significant lumbar back pain and denies any radicular symptoms any particular dermatomal pattern it is hard to attribute her paresthesias to her prior lumbar surgery, however I agree it would be very unlikely to abruptly develop small fiber neuropathy immediately after lumbar surgery, with having no prior symptoms  As such I have recommended she continue to work with pain management and continue her current medication regimen  We did discuss potentially adding duloxetine, however she would prefer not to be on an antidepressant  Lyrica is also an alternative to consider in the future  She would like to have a repeat EMG to further evaluate for any neuropathy vs radiculopathy  I will request that 1 of our neuromuscular specialists completes the EMG  Pending this testing, we can consider further testing for small fiber neuropathy  She understands if the EMG is negative, then from a neurologic perspective I am not sure that there would be more to offer in terms of identifying a diagnosis unfortunately  Plan discussed with patient as outlined below      Plan:    - Follow up with Pain Management  - Continue current medication regimen  - Complete updated EMG (in [facility])  - Follow up in 4 months        Diagnoses and all orders for this visit:    Paresthesias  -     EMG 2 limb lower extremity; Future       I have spent 45 minutes with this patient in face-to-face time and chart reviewed today    Subjective:    RAMOS Suad Frazier is a 62year old female known to the practice for painful paresthesias in bilateral legs from the level of below the knees down since her lumbar laminectomies (March 2019 with revision in April 2019)  She was last seen by Dr Jonathan Rosado 9/28/21  She has completed an extensive work up including rheumatologic work up which was unrevealing other than a hx of EBV  Her spep, vitamin b12, RF, MAY, thyroid studies, HgbA1c, and Lyme titer have all been negative  She has had EMG testing 12/1/2020 which was also essentially normal  In the interim, she continues to follow with Pain management and is maintained on Gabapentin 800 mg tid and Meloxicam prn  She did return to see her spinal surgeon but states she was "brushed off"  She explains instantaneously after her lumbar laminectomy in March 2019 she began to have bilateral lower extremity tingling, numbness, shooting sharp pain, and stiffness from her hips all the way down to her toes, worse on the left than right  Ultimately she was taken back for a revision of her lumbar laminectomy in April 2019 with improvement of symptoms from her hips to her knees, however from below the knee down she continued to have the same symptoms  She states at baseline her pain is 8-9/10, with taking gabapentin 800 mg t i d  her pain is 6/10, stating it is tolerable  She states meloxicam as needed does help when pain is exacerbated  She denies any specific activities that exacerbate her pain but does state she has a sensation that she needs to keep her legs moving otherwise she has an uncomfortable crawling sensation  She did not complete physical therapy as requested at her last appointment, due to financial reasons    She denies any lumbar back pain and explains her tingling, numbness, shooting sharp pain, and stiffness is of bilateral legs from the level of below the knees down into the feet and toes without any specific dermatomal pattern  She also has cramps in her feet occasionally  She denies weakness, or loss of bowel or bladder  She denies any other neurologic complaints  The following portions of the patient's history were reviewed and updated as appropriate: allergies, current medications, past family history, past medical history, past social history and past surgical history  Objective:    Blood pressure 130/72, pulse 64, height 5' (1 524 m), weight 59 kg (130 lb), SpO2 99 %, not currently breastfeeding  Neurological Exam    On neurological examination patient is alert, awake, oriented and in no distress  Speech is fluent without dysarthria or aphasia  Cranial nerves 2-12 were symmetrically intact bilaterally  No evidence of any focal weakness or sensory loss in the upper or lower extremities  Motor testing reveals 5/5 strength of the bilateral upper and lower extremities, with excellent bulk and tone  There was no pronator drift  No fasciculations present  No abnormal involuntary movements  Finger- to-nose reveals no tremor or ataxia and intact proprioceptive function, no dysmetria was noted  Sensation was intact to vibration, light touch, pin prick and temperature in bilateral upper and lower extremities and is completely symmetric comparing distally to proximally and left to right  Deep tendon reflexes were 2+ and symmetric in the bilateral upper and lower extremities  She is able to rise easily without assistance from a seated position  Casual gait is normal including stance, stride, and arm swing  Normal tandem gait  Romberg is absent  ROS:    Review of Systems   Constitutional: Negative  Negative for appetite change and fever  HENT: Negative    Negative for hearing loss, tinnitus, trouble swallowing and voice change  Eyes: Negative  Negative for photophobia and pain  Respiratory: Negative  Negative for shortness of breath  Cardiovascular: Negative  Negative for palpitations  Gastrointestinal: Negative  Negative for nausea and vomiting  Endocrine: Negative  Negative for cold intolerance  Genitourinary: Negative  Negative for dysuria, frequency and urgency  Musculoskeletal: Negative  Negative for myalgias and neck pain  Skin: Negative  Negative for rash  Neurological: Positive for numbness  Negative for dizziness, tremors, seizures, syncope, facial asymmetry, speech difficulty, weakness, light-headedness and headaches  Patient states she feels numbness and tingling in both knees going down both her feet  Hematological: Negative  Does not bruise/bleed easily  Psychiatric/Behavioral: Negative  Negative for confusion, hallucinations and sleep disturbance  Reviewed ROS as entered by medical assistant

## 2022-08-18 NOTE — ASSESSMENT & PLAN NOTE
Abeba Sebastian is a 62year old female known to the practice for painful paresthesias in bilateral legs from the level of below the knees down since her lumbar laminectomies (March 2019 with revision in April 2019)  She has had a thorough autoimmune workup with no cause identified and EMG last performed December 2020 was normal   She at baseline has chronic pain 6/10 despite gabapentin 800 mg 3 times a day and meloxicam as needed  Her exam is excellent today and there is no motor, strength  or sensory deficits, arguing against small fiber neuropathy or other neuromuscular condition  We discussed today given she is not having any significant lumbar back pain and denies any radicular symptoms any particular dermatomal pattern it is hard to attribute her paresthesias to her prior lumbar surgery, however I agree it would be very unlikely to abruptly develop small fiber neuropathy immediately after lumbar surgery, with having no prior symptoms  As such I have recommended she continue to work with pain management and continue her current medication regimen  We did discuss potentially adding duloxetine, however she would prefer not to be on an antidepressant  Lyrica is also an alternative to consider in the future  She would like to have a repeat EMG to further evaluate for any neuropathy vs radiculopathy  I will request that 1 of our neuromuscular specialists completes the EMG  Pending this testing, we can consider further testing for small fiber neuropathy  She understands if the EMG is negative, then from a neurologic perspective I am not sure that there would be more to offer in terms of identifying a diagnosis unfortunately  Plan discussed with patient as outlined below      Plan:    - Follow up with Pain Management  - Continue current medication regimen  - Complete updated EMG (in [address])  - Follow up in 4 months

## 2022-08-18 NOTE — PATIENT INSTRUCTIONS
- Follow up with Pain Management  - Continue current medication regimen  - Complete updated EMG (in [facility])  - Follow up in 4 months

## 2022-09-01 ENCOUNTER — OFFICE VISIT (OUTPATIENT)
Dept: INTERNAL MEDICINE CLINIC | Facility: CLINIC | Age: 57
End: 2022-09-01
Payer: COMMERCIAL

## 2022-09-01 VITALS
SYSTOLIC BLOOD PRESSURE: 110 MMHG | DIASTOLIC BLOOD PRESSURE: 72 MMHG | BODY MASS INDEX: 25.58 KG/M2 | HEART RATE: 70 BPM | WEIGHT: 131 LBS

## 2022-09-01 DIAGNOSIS — Z12.31 ENCOUNTER FOR SCREENING MAMMOGRAM FOR BREAST CANCER: ICD-10-CM

## 2022-09-01 DIAGNOSIS — Z00.00 ADULT GENERAL MEDICAL EXAMINATION: Primary | ICD-10-CM

## 2022-09-01 DIAGNOSIS — L65.9 HAIR LOSS: ICD-10-CM

## 2022-09-01 DIAGNOSIS — R22.1 NECK MASS: ICD-10-CM

## 2022-09-01 PROCEDURE — 3725F SCREEN DEPRESSION PERFORMED: CPT | Performed by: INTERNAL MEDICINE

## 2022-09-01 PROCEDURE — 99396 PREV VISIT EST AGE 40-64: CPT | Performed by: INTERNAL MEDICINE

## 2022-09-01 RX ORDER — MINOXIDIL 2.5 MG/1
1.25 TABLET ORAL DAILY
Qty: 45 TABLET | Refills: 1 | Status: SHIPPED | OUTPATIENT
Start: 2022-09-01

## 2022-09-01 NOTE — PROGRESS NOTES
ADULT ANNUAL PHYSICAL   [address]    NAME: Fredo Mata  AGE: 62 y o  SEX: female  : 1965     DATE: 2022     Assessment and Plan:     1  Adult general medical examination  - Lipid Panel with Direct LDL reflex; Future    2  Encounter for screening mammogram for breast cancer  - Mammo screening bilateral w 3d & cad; Future    3  Neck mass    Check ultrasound for further evaluation    - US head neck soft tissue; Future    4  Hair loss  - minoxidil (LONITEN) 2 5 mg tablet; Take 0 5 tablets (1 25 mg total) by mouth daily  Dispense: 45 tablet; Refill: 1     Immunizations and preventive care screenings were discussed with patient today  Appropriate education was printed on patient's after visit summary  Counseling:  Alcohol/drug use: discussed moderation in alcohol intake, the recommendations for healthy alcohol use, and avoidance of illicit drug use  Dental Health: discussed importance of regular tooth brushing, flossing, and dental visits  Injury prevention: discussed safety/seat belts, safety helmets, smoke detectors, carbon dioxide detectors, and smoking near bedding or upholstery  Sexual health: discussed sexually transmitted diseases, partner selection, use of condoms, avoidance of unintended pregnancy, and contraceptive alternatives  · Exercise: the importance of regular exercise/physical activity was discussed  Recommend exercise 3-5 times per week for at least 30 minutes  BMI Counseling: Body mass index is 25 58 kg/m²  The BMI is above normal  Nutrition recommendations include encouraging healthy choices of fruits and vegetables, moderation in carbohydrate intake and increasing intake of lean protein  Exercise recommendations include exercising 3-5 times per week  Rationale for BMI follow-up plan is due to patient being overweight or obese       Depression Screening and Follow-up Plan: Patient was screened for depression during today's encounter  They screened negative with a PHQ-2 score of 0  Return in about 1 year (around 9/2/2023)  Chief Complaint:     Chief Complaint   Patient presents with    Physical Exam      History of Present Illness:     Adult Annual Physical   Patient here for a comprehensive physical exam  The patient reports she is still dealing with some ringing in her left ear at times  She saw ENT and they did a hearing test and it was normal     Saw on the news about using minoxidil for hair loss  She has tried other medications for hair loss and they haven't worked well  She would like to try this treatment  Has a bump on the left side of her neck that has been getting bigger  No fever, chills, weight loss  Does get some night sweats from menopause  Saw GI due to fatty liver  She has no significant fibrosis  Depression Screening  PHQ-2/9 Depression Screening    Little interest or pleasure in doing things: 0 - not at all  Feeling down, depressed, or hopeless: 0 - not at all  PHQ-2 Score: 0  PHQ-2 Interpretation: Negative depression screen        Review of Systems:     Review of Systems   Constitutional: Negative for appetite change, chills, fatigue and fever  HENT: Positive for tinnitus  Negative for congestion, hearing loss, postnasal drip, rhinorrhea, sore throat and trouble swallowing  Eyes: Negative for pain, discharge, redness and visual disturbance  Respiratory: Negative for cough, chest tightness, shortness of breath and wheezing  Cardiovascular: Negative for chest pain, palpitations and leg swelling  Gastrointestinal: Negative for abdominal distention, abdominal pain, blood in stool, constipation, diarrhea, nausea and vomiting  Endocrine: Negative for cold intolerance, heat intolerance, polydipsia, polyphagia and polyuria  Genitourinary: Negative for difficulty urinating, dysuria, frequency, hematuria and urgency  Musculoskeletal: Positive for arthralgias   Negative for back pain, gait problem, joint swelling, myalgias, neck pain and neck stiffness  Skin: Negative for color change and rash  Mass left side of neck   Neurological: Negative for dizziness, tremors, seizures, syncope, speech difficulty, weakness, light-headedness, numbness and headaches  Hematological: Negative for adenopathy  Does not bruise/bleed easily  Psychiatric/Behavioral: Negative for agitation, behavioral problems, confusion, hallucinations, sleep disturbance and suicidal ideas  The patient is not nervous/anxious         Past Medical History:     Past Medical History:   Diagnosis Date    Abdominal pain     Arthralgia of pelvic region and thigh     Chest pain     CTS (carpal tunnel syndrome)     Fibromyalgia     Hepatitis     Leukocytopenia     Leukorrhea     Temporomandibular joint sounds on opening and/or closing the jaw       Past Surgical History:     Past Surgical History:   Procedure Laterality Date    BACK SURGERY      CHOLECYSTECTOMY      FOOT SURGERY Right     INCONTINENCE SURGERY      vaginal sling for stress incontinence    [facility]    SINUS SURGERY      SPINE SURGERY  03/05/2019      Social History:     Social History     Socioeconomic History    Marital status: /Civil Union     Spouse name: None    Number of children: None    Years of education: None    Highest education level: None   Occupational History    Occupation: homemaker   Tobacco Use    Smoking status: Never Smoker    Smokeless tobacco: Never Used   Vaping Use    Vaping Use: Never used   Substance and Sexual Activity    Alcohol use: No    Drug use: No    Sexual activity: Yes     Partners: Male     Birth control/protection: None   Other Topics Concern    None   Social History Narrative    None     Social Determinants of Health     Financial Resource Strain: Not on file   Food Insecurity: Not on file   Transportation Needs: Not on file   Physical Activity: Not on file Stress: Not on file   Social Connections: Not on file   Intimate Partner Violence: Not on file   Housing Stability: Not on file      Family History:     Family History   Problem Relation Age of Onset    Alzheimer's disease Father     Asthma Father     Heart attack Father     Alzheimer's disease Maternal Grandmother     Lymphoma Maternal Aunt     Emphysema Maternal Grandfather       Current Medications:     Current Outpatient Medications   Medication Sig Dispense Refill    acetaminophen (TYLENOL) 325 mg tablet Take 500 mg by mouth as needed for mild pain      Ascorbic Acid (Vitamin C ER) 1000 MG TBCR Take 1,000 mg by mouth      Cholecalciferol 50 MCG (2000 UT) TABS Take 2,000 Units by mouth      gabapentin (NEURONTIN) 800 mg tablet Take 1 tablet (800 mg total) by mouth 3 (three) times a day 270 tablet 1    Linzess 145 MCG CAPS TAKE 1 CAPSULE DAILY 90 capsule 3    meloxicam (MOBIC) 7 5 mg tablet Take 1 tablet (7 5 mg total) by mouth daily as needed for moderate pain 30 tablet 0    minoxidil (LONITEN) 2 5 mg tablet Take 0 5 tablets (1 25 mg total) by mouth daily 45 tablet 1    mometasone (NASONEX) 50 mcg/act nasal spray 2 sprays into each nostril daily 51 g 3    multivitamin (THERAGRAN) TABS Take 1 tablet by mouth daily       No current facility-administered medications for this visit  Allergies: Allergies   Allergen Reactions    Other Sneezing and Nasal Congestion     SEASONAL ALLERGIES      Physical Exam:     /72   Pulse 70   Wt 59 4 kg (131 lb)   BMI 25 58 kg/m²     Physical Exam  Vitals reviewed  Constitutional:       General: She is not in acute distress  Appearance: She is well-developed  She is not diaphoretic  HENT:      Right Ear: Tympanic membrane, ear canal and external ear normal  There is no impacted cerumen  Left Ear: Tympanic membrane, ear canal and external ear normal  There is no impacted cerumen        Mouth/Throat:      Pharynx: No oropharyngeal exudate or posterior oropharyngeal erythema  Eyes:      General: No scleral icterus  Right eye: No discharge  Left eye: No discharge  Conjunctiva/sclera: Conjunctivae normal    Neck:      Thyroid: No thyromegaly  Vascular: No JVD  Comments: Some fullness left side of neck  Cardiovascular:      Rate and Rhythm: Normal rate and regular rhythm  Heart sounds: Normal heart sounds  No murmur heard  Pulmonary:      Effort: Pulmonary effort is normal  No respiratory distress  Breath sounds: Normal breath sounds  No wheezing or rales  Abdominal:      General: Bowel sounds are normal  There is no distension  Palpations: Abdomen is soft  Tenderness: There is no abdominal tenderness  Musculoskeletal:      Cervical back: Neck supple  Right lower leg: No edema  Left lower leg: No edema  Lymphadenopathy:      Cervical: Cervical adenopathy (left) present  Skin:     General: Skin is warm and dry  Neurological:      Mental Status: She is alert  Mental status is at baseline     Psychiatric:         Mood and Affect: Mood normal          Behavior: Behavior normal         Sridhar Cedeño   MEDICAL [id number]

## 2022-09-01 NOTE — PATIENT INSTRUCTIONS
Wellness Visit for Adults   AMBULATORY CARE:   A wellness visit  is when you see your healthcare provider to get screened for health problems  Your healthcare provider will also give you advice on how to stay healthy  Write down your questions so you remember to ask them  Ask your healthcare provider how often you should have a wellness visit  What happens at a wellness visit:  Your healthcare provider will ask about your health, and your family history of health problems  This includes high blood pressure, heart disease, and cancer  He or she will ask if you have symptoms that concern you, if you smoke, and about your mood  You may also be asked about your intake of medicines, supplements, food, and alcohol  Any of the following may be done: Your weight  will be checked  Your height may also be checked so your body mass index (BMI) can be calculated  Your BMI shows if you are at a healthy weight  Your blood pressure  and heart rate will be checked  Your temperature may also be checked  Blood and urine tests  may be done  Blood tests may be done to check your cholesterol levels  Abnormal cholesterol levels increase your risk for heart disease and stroke  You may also need a blood or urine test to check for diabetes if you are at increased risk  Urine tests may be done to look for signs of an infection or kidney disease  A physical exam  includes checking your heartbeat and lungs with a stethoscope  Your healthcare provider may also check your skin to look for sun damage  Screening tests  may be recommended  A screening test is done to check for diseases that may not cause symptoms  The screening tests you may need depend on your age, gender, family history, and lifestyle habits  For example, colorectal screening may be recommended if you are 48years old or older  Screening tests you need if you are a woman:   A Pap smear  is used to screen for cervical cancer   Pap smears are usually done every 3 to 5 years depending on your age  You may need them more often if you have had abnormal Pap smear test results in the past  Ask your healthcare provider how often you should have a Pap smear  A mammogram  is an x-ray of your breasts to screen for breast cancer  Experts recommend mammograms every 2 years starting at age 48 years  You may need a mammogram at age 52 years or younger if you have an increased risk for breast cancer  Talk to your healthcare provider about when you should start having mammograms and how often you need them  Vaccines you may need:   Get an influenza vaccine  every year  The influenza vaccine protects you from the flu  Several types of viruses cause the flu  The viruses change over time, so new vaccines are made each year  Get a tetanus-diphtheria (Td) booster vaccine  every 10 years  This vaccine protects you against tetanus and diphtheria  Tetanus is a severe infection that may cause painful muscle spasms and lockjaw  Diphtheria is a severe bacterial infection that causes a thick covering in the back of your mouth and throat  Get a human papillomavirus (HPV) vaccine  if you are female and aged 23 to 32 or male 23 to 24 and never received it  This vaccine protects you from HPV infection  HPV is the most common infection spread by sexual contact  HPV may also cause vaginal, penile, and anal cancers  Get a pneumococcal vaccine  if you are aged 72 years or older  The pneumococcal vaccine is an injection given to protect you from pneumococcal disease  Pneumococcal disease is an infection caused by pneumococcal bacteria  The infection may cause pneumonia, meningitis, or an ear infection  Get a shingles vaccine  if you are 60 or older, even if you have had shingles before  The shingles vaccine is an injection to protect you from the varicella-zoster virus  This is the same virus that causes chickenpox   Shingles is a painful rash that develops in people who had chickenpox or have been exposed to the virus  How to eat healthy:  My Plate is a model for planning healthy meals  It shows the types and amounts of foods that should go on your plate  Fruits and vegetables make up about half of your plate, and grains and protein make up the other half  A serving of dairy is included on the side of your plate  The amount of calories and serving sizes you need depends on your age, gender, weight, and height  Examples of healthy foods are listed below:  Eat a variety of vegetables  such as dark green, red, and orange vegetables  You can also include canned vegetables low in sodium (salt) and frozen vegetables without added butter or sauces  Eat a variety of fresh fruits , canned fruit in 100% juice, frozen fruit, and dried fruit  Include whole grains  At least half of the grains you eat should be whole grains  Examples include whole-wheat bread, wheat pasta, brown rice, and whole-grain cereals such as oatmeal     Eat a variety of protein foods such as seafood (fish and shellfish), lean meat, and poultry without skin (turkey and chicken)  Examples of lean meats include pork leg, shoulder, or tenderloin, and beef round, sirloin, tenderloin, and extra lean ground beef  Other protein foods include eggs and egg substitutes, beans, peas, soy products, nuts, and seeds  Choose low-fat dairy products such as skim or 1% milk or low-fat yogurt, cheese, and cottage cheese  Limit unhealthy fats  such as butter, hard margarine, and shortening  Exercise:  Exercise at least 30 minutes per day on most days of the week  Some examples of exercise include walking, biking, dancing, and swimming  You can also fit in more physical activity by taking the stairs instead of the elevator or parking farther away from stores  Include muscle strengthening activities 2 days each week  Regular exercise provides many health benefits   It helps you manage your weight, and decreases your risk for type 2 diabetes, heart disease, stroke, and high blood pressure  Exercise can also help improve your mood  Ask your healthcare provider about the best exercise plan for you  General health and safety guidelines:   Do not smoke  Nicotine and other chemicals in cigarettes and cigars can cause lung damage  Ask your healthcare provider for information if you currently smoke and need help to quit  E-cigarettes or smokeless tobacco still contain nicotine  Talk to your healthcare provider before you use these products  Limit alcohol  A drink of alcohol is 12 ounces of beer, 5 ounces of wine, or 1½ ounces of liquor  Lose weight, if needed  Being overweight increases your risk of certain health conditions  These include heart disease, high blood pressure, type 2 diabetes, and certain types of cancer  Protect your skin  Do not sunbathe or use tanning beds  Use sunscreen with a SPF 15 or higher  Apply sunscreen at least 15 minutes before you go outside  Reapply sunscreen every 2 hours  Wear protective clothing, hats, and sunglasses when you are outside  Drive safely  Always wear your seatbelt  Make sure everyone in your car wears a seatbelt  A seatbelt can save your life if you are in an accident  Do not use your cell phone when you are driving  This could distract you and cause an accident  Pull over if you need to make a call or send a text message  Practice safe sex  Use latex condoms if are sexually active and have more than one partner  Your healthcare provider may recommend screening tests for sexually transmitted infections (STIs)  Wear helmets, lifejackets, and protective gear  Always wear a helmet when you ride a bike or motorcycle, go skiing, or play sports that could cause a head injury  Wear protective equipment when you play sports  Wear a lifejacket when you are on a boat or doing water sports      © Copyright [employer] 2022 Information is for End User's use only and may not be sold, redistributed or otherwise used for commercial purposes  All illustrations and images included in CareNotes® are the copyrighted property of A D A M , Inc  or Sara Rivas  The above information is an  only  It is not intended as medical advice for individual conditions or treatments  Talk to your doctor, nurse or pharmacist before following any medical regimen to see if it is safe and effective for you

## 2022-09-13 ENCOUNTER — TELEPHONE (OUTPATIENT)
Dept: GASTROENTEROLOGY | Facility: CLINIC | Age: 57
End: 2022-09-13

## 2022-09-13 NOTE — TELEPHONE ENCOUNTER
Patients GI provider:  Dr Giovanni Veliz    Number to return call: [phone]    Reason for call: Pt calling stating she needs a prior auth for US and labs  Pt is not sure which one needs labs  Medical Management dept  can be reached at [phone]      Scheduled procedure/appointment date if applicable: Appt  88/88/00

## 2022-09-14 ENCOUNTER — HOSPITAL ENCOUNTER (OUTPATIENT)
Dept: ULTRASOUND IMAGING | Facility: HOSPITAL | Age: 57
Discharge: HOME | End: 2022-09-14
Attending: INTERNAL MEDICINE
Payer: COMMERCIAL

## 2022-09-14 ENCOUNTER — HOSPITAL ENCOUNTER (OUTPATIENT)
Dept: RADIOLOGY | Facility: HOSPITAL | Age: 57
Discharge: HOME | End: 2022-09-14
Payer: COMMERCIAL

## 2022-09-14 ENCOUNTER — APPOINTMENT (OUTPATIENT)
Dept: LAB | Facility: HOSPITAL | Age: 57
End: 2022-09-14
Payer: COMMERCIAL

## 2022-09-14 DIAGNOSIS — Z79.899 ENCOUNTER FOR LONG-TERM (CURRENT) USE OF OTHER MEDICATIONS: ICD-10-CM

## 2022-09-14 DIAGNOSIS — Z00.00 ADULT GENERAL MEDICAL EXAMINATION: ICD-10-CM

## 2022-09-14 DIAGNOSIS — M15.0 PRIMARY GENERALIZED (OSTEO)ARTHRITIS: ICD-10-CM

## 2022-09-14 DIAGNOSIS — R22.1 NECK MASS: ICD-10-CM

## 2022-09-14 LAB
ALBUMIN SERPL BCP-MCNC: 3.9 G/DL (ref 3.5–5)
ALP SERPL-CCNC: 90 U/L (ref 46–116)
ALT SERPL W P-5'-P-CCNC: 35 U/L (ref 12–78)
ANION GAP SERPL CALCULATED.3IONS-SCNC: 7 MMOL/L (ref 4–13)
AST SERPL W P-5'-P-CCNC: 23 U/L (ref 5–45)
BASOPHILS # BLD AUTO: 0.03 THOUSANDS/ΜL (ref 0–0.1)
BASOPHILS NFR BLD AUTO: 1 % (ref 0–1)
BILIRUB SERPL-MCNC: 0.57 MG/DL (ref 0.2–1)
BUN SERPL-MCNC: 12 MG/DL (ref 5–25)
CALCIUM SERPL-MCNC: 9.3 MG/DL (ref 8.3–10.1)
CHLORIDE SERPL-SCNC: 106 MMOL/L (ref 96–108)
CHOLEST SERPL-MCNC: 275 MG/DL
CO2 SERPL-SCNC: 29 MMOL/L (ref 21–32)
CREAT SERPL-MCNC: 0.68 MG/DL (ref 0.6–1.3)
EOSINOPHIL # BLD AUTO: 0.07 THOUSAND/ΜL (ref 0–0.61)
EOSINOPHIL NFR BLD AUTO: 2 % (ref 0–6)
ERYTHROCYTE [DISTWIDTH] IN BLOOD BY AUTOMATED COUNT: 12.3 % (ref 11.6–15.1)
ERYTHROCYTE [SEDIMENTATION RATE] IN BLOOD: 26 MM/HOUR (ref 0–29)
GFR SERPL CREATININE-BSD FRML MDRD: 97 ML/MIN/1.73SQ M
GLUCOSE P FAST SERPL-MCNC: 96 MG/DL (ref 65–99)
HCT VFR BLD AUTO: 40.4 % (ref 34.8–46.1)
HDLC SERPL-MCNC: 81 MG/DL
HGB BLD-MCNC: 13.5 G/DL (ref 11.5–15.4)
IMM GRANULOCYTES # BLD AUTO: 0.01 THOUSAND/UL (ref 0–0.2)
IMM GRANULOCYTES NFR BLD AUTO: 0 % (ref 0–2)
LDLC SERPL CALC-MCNC: 168 MG/DL (ref 0–100)
LYMPHOCYTES # BLD AUTO: 1.35 THOUSANDS/ΜL (ref 0.6–4.47)
LYMPHOCYTES NFR BLD AUTO: 47 % (ref 14–44)
MCH RBC QN AUTO: 30.2 PG (ref 26.8–34.3)
MCHC RBC AUTO-ENTMCNC: 33.4 G/DL (ref 31.4–37.4)
MCV RBC AUTO: 90 FL (ref 82–98)
MONOCYTES # BLD AUTO: 0.19 THOUSAND/ΜL (ref 0.17–1.22)
MONOCYTES NFR BLD AUTO: 6 % (ref 4–12)
NEUTROPHILS # BLD AUTO: 1.3 THOUSANDS/ΜL (ref 1.85–7.62)
NEUTS SEG NFR BLD AUTO: 44 % (ref 43–75)
NRBC BLD AUTO-RTO: 0 /100 WBCS
PLATELET # BLD AUTO: 259 THOUSANDS/UL (ref 149–390)
PMV BLD AUTO: 10.3 FL (ref 8.9–12.7)
POTASSIUM SERPL-SCNC: 3.8 MMOL/L (ref 3.5–5.3)
PROT SERPL-MCNC: 8 G/DL (ref 6.4–8.4)
RBC # BLD AUTO: 4.47 MILLION/UL (ref 3.81–5.12)
SODIUM SERPL-SCNC: 142 MMOL/L (ref 135–147)
TRIGL SERPL-MCNC: 130 MG/DL
WBC # BLD AUTO: 2.95 THOUSAND/UL (ref 4.31–10.16)

## 2022-09-14 PROCEDURE — 73130 X-RAY EXAM OF HAND: CPT

## 2022-09-14 PROCEDURE — 36415 COLL VENOUS BLD VENIPUNCTURE: CPT

## 2022-09-14 PROCEDURE — 73110 X-RAY EXAM OF WRIST: CPT

## 2022-09-14 PROCEDURE — 85652 RBC SED RATE AUTOMATED: CPT

## 2022-09-14 PROCEDURE — 85025 COMPLETE CBC W/AUTO DIFF WBC: CPT

## 2022-09-14 PROCEDURE — 80061 LIPID PANEL: CPT

## 2022-09-14 PROCEDURE — 76536 US EXAM OF HEAD AND NECK: CPT

## 2022-09-14 PROCEDURE — 80053 COMPREHEN METABOLIC PANEL: CPT

## 2022-09-16 DIAGNOSIS — M96.1 LUMBAR POST-LAMINECTOMY SYNDROME: ICD-10-CM

## 2022-09-16 DIAGNOSIS — G89.4 CHRONIC PAIN SYNDROME: ICD-10-CM

## 2022-09-16 DIAGNOSIS — M54.16 LUMBAR RADICULOPATHY: ICD-10-CM

## 2022-09-16 RX ORDER — GABAPENTIN 800 MG/1
800 TABLET ORAL 3 TIMES DAILY
Qty: 270 TABLET | Refills: 0 | Status: SHIPPED | OUTPATIENT
Start: 2022-09-16 | End: 2022-10-03 | Stop reason: SDUPTHER

## 2022-09-16 RX ORDER — MELOXICAM 7.5 MG/1
7.5 TABLET ORAL DAILY PRN
Qty: 90 TABLET | Refills: 0 | Status: SHIPPED | OUTPATIENT
Start: 2022-09-16 | End: 2022-10-03 | Stop reason: SDUPTHER

## 2022-09-19 ENCOUNTER — APPOINTMENT (OUTPATIENT)
Dept: LAB | Facility: HOSPITAL | Age: 57
End: 2022-09-19
Payer: COMMERCIAL

## 2022-09-19 DIAGNOSIS — D72.819 LEUKOPENIA, UNSPECIFIED TYPE: ICD-10-CM

## 2022-09-19 LAB
BASOPHILS # BLD AUTO: 0.03 THOUSANDS/ΜL (ref 0–0.1)
BASOPHILS NFR BLD AUTO: 1 % (ref 0–1)
EOSINOPHIL # BLD AUTO: 0.06 THOUSAND/ΜL (ref 0–0.61)
EOSINOPHIL NFR BLD AUTO: 2 % (ref 0–6)
ERYTHROCYTE [DISTWIDTH] IN BLOOD BY AUTOMATED COUNT: 12.2 % (ref 11.6–15.1)
HCT VFR BLD AUTO: 37.5 % (ref 34.8–46.1)
HGB BLD-MCNC: 12.6 G/DL (ref 11.5–15.4)
IMM GRANULOCYTES # BLD AUTO: 0.01 THOUSAND/UL (ref 0–0.2)
IMM GRANULOCYTES NFR BLD AUTO: 0 % (ref 0–2)
LYMPHOCYTES # BLD AUTO: 1.35 THOUSANDS/ΜL (ref 0.6–4.47)
LYMPHOCYTES NFR BLD AUTO: 45 % (ref 14–44)
MCH RBC QN AUTO: 31.2 PG (ref 26.8–34.3)
MCHC RBC AUTO-ENTMCNC: 33.6 G/DL (ref 31.4–37.4)
MCV RBC AUTO: 93 FL (ref 82–98)
MONOCYTES # BLD AUTO: 0.23 THOUSAND/ΜL (ref 0.17–1.22)
MONOCYTES NFR BLD AUTO: 8 % (ref 4–12)
NEUTROPHILS # BLD AUTO: 1.29 THOUSANDS/ΜL (ref 1.85–7.62)
NEUTS SEG NFR BLD AUTO: 44 % (ref 43–75)
NRBC BLD AUTO-RTO: 0 /100 WBCS
PLATELET # BLD AUTO: 244 THOUSANDS/UL (ref 149–390)
PMV BLD AUTO: 9.7 FL (ref 8.9–12.7)
RBC # BLD AUTO: 4.04 MILLION/UL (ref 3.81–5.12)
WBC # BLD AUTO: 2.97 THOUSAND/UL (ref 4.31–10.16)

## 2022-09-19 PROCEDURE — 85025 COMPLETE CBC W/AUTO DIFF WBC: CPT

## 2022-09-19 PROCEDURE — 36415 COLL VENOUS BLD VENIPUNCTURE: CPT

## 2022-09-19 NOTE — TELEPHONE ENCOUNTER
Pt called back to follow up on prior auth needed for the ultrasound done on 6-8-22   Asked that we contact medical management  Dept at #  [phone]

## 2022-09-21 PROBLEM — M18.11 PRIMARY OSTEOARTHRITIS OF FIRST CARPOMETACARPAL JOINT OF RIGHT HAND: Status: ACTIVE | Noted: 2022-09-21

## 2022-09-21 PROBLEM — D70.9 NEUTROPENIA (HCC): Status: ACTIVE | Noted: 2022-09-21

## 2022-09-28 NOTE — PROGRESS NOTES
Pain Medicine Follow-Up Note    Assessment:  1  Chronic pain syndrome    2  Spinal stenosis of lumbosacral region    3  S/P lumbar spine operation    4  Primary generalized (osteo)arthritis    5  Lumbar radiculopathy    6  Lumbar post-laminectomy syndrome    7  Neuropathy        Plan:      New Medications Ordered This Visit   Medications    gabapentin (NEURONTIN) 800 mg tablet     Sig: Take 1 tablet (800 mg total) by mouth 3 (three) times a day     Dispense:  270 tablet     Refill:  1    meloxicam (MOBIC) 7 5 mg tablet     Sig: Take 1 tablet (7 5 mg total) by mouth daily as needed for moderate pain     Dispense:  90 tablet     Refill:  1       My impressions and treatment recommendations were discussed in detail with the patient who verbalized understanding and had no further questions  Patient is a 63-year-old female that presents the office in regards to her chronic pain secondary to neuropathy and lumbar post laminectomy syndrome  Patient states that her pain is worse today however she feels that it is related to the weather  Discussed with patient at length about other options to treat her pain  Patient initially said that she would never consider a stimulator however she did request a pamphlet on this that maybe in the future she may consider  Patient does state that gabapentin 800 mg t i d  and Mobic 7 5 mg once daily p r n ,  do provide her a significant reduction of her pain symptoms and improve her level of functioning without significant side effects therefore I will continue the patient on these medications  Follow-up is planned in 6 months time or sooner as warranted  Discharge instructions were provided  I personally saw and examined the patient and I agree with the above discussed plan of care  History of Present Illness:    Silverio Valdez is a 62 y o  female who presents to [facility] for interval re-evaluation of the above stated pain complaints   The patient has a past medical and chronic pain history as outlined in the assessment section  She was last seen on 3/28/2022  At today's visit patient states that her pain symptoms are worse with a pain score of 8/10 on the verbal numeric pain scale  The patient states that her pain is worse in the morning, evening, and at night  The patient's pain is constant in nature  The quality of the patient's pain is described as throbbing, cramping, pressure-like, shooting, and pins and needles  Patient indicates that her pain is in her bilateral low legs distal from the knees  Patient states that she is receiving enough pain relief from her pain relievers to make a real difference in her life by providing her with 70% reduction of her overall pain symptoms  Other than as stated above, the patient denies any interval changes in medications, medical condition, mental condition, symptoms, or allergies since the last office visit  Review of Systems:    Review of Systems   Respiratory: Negative for shortness of breath  Cardiovascular: Negative for chest pain  Gastrointestinal: Negative for constipation, diarrhea, nausea and vomiting  Musculoskeletal: Positive for gait problem  Negative for arthralgias, joint swelling and myalgias  Skin: Negative for rash  Neurological: Negative for dizziness, seizures and weakness  All other systems reviewed and are negative          Patient Active Problem List   Diagnosis    Extrinsic asthma    Spinal stenosis of lumbosacral region    Irritable bowel syndrome    Borderline hypercholesterolemia    HNP (herniated nucleus pulposus), lumbar    S/P lumbar spine operation    Intramural leiomyoma of uterus    Chronic idiopathic constipation    Primary generalized (osteo)arthritis    Paresthesias    Fatty liver    Primary osteoarthritis of both first carpometacarpal joints    Neutropenia (HCC)    Primary osteoarthritis of first carpometacarpal joint of right hand       Past Medical History:   Diagnosis Date    Abdominal pain     Arthralgia of pelvic region and thigh     Chest pain     CTS (carpal tunnel syndrome)     Fibromyalgia     Hepatitis     Leukocytopenia     Leukorrhea     Temporomandibular joint sounds on opening and/or closing the jaw        Past Surgical History:   Procedure Laterality Date    BACK SURGERY      CHOLECYSTECTOMY      FOOT SURGERY Right     INCONTINENCE SURGERY      vaginal sling for stress incontinence    [facility]    SINUS SURGERY      SPINAL FUSION  3/5/2019    SPINE SURGERY  03/05/2019       Family History   Problem Relation Age of Onset    Alzheimer's disease Father     Asthma Father     Heart attack Father     COPD Father     Hypertension Father     Alzheimer's disease Maternal Grandmother     Lymphoma Maternal Aunt     Emphysema Maternal Grandfather        Social History     Occupational History    Occupation: [employer]   Tobacco Use    Smoking status: Never Smoker    Smokeless tobacco: Never Used   Vaping Use    Vaping Use: Never used   Substance and Sexual Activity    Alcohol use: No    Drug use: No    Sexual activity: Yes     Partners: Male     Birth control/protection: Female Sterilization, None         Current Outpatient Medications:     acetaminophen (TYLENOL) 325 mg tablet, Take 500 mg by mouth as needed for mild pain, Disp: , Rfl:     Ascorbic Acid (Vitamin C ER) 1000 MG TBCR, Take 1,000 mg by mouth, Disp: , Rfl:     Cholecalciferol 50 MCG (2000 UT) TABS, Take 2,000 Units by mouth, Disp: , Rfl:     gabapentin (NEURONTIN) 800 mg tablet, Take 1 tablet (800 mg total) by mouth 3 (three) times a day, Disp: 270 tablet, Rfl: 1    Linzess 145 MCG CAPS, TAKE 1 CAPSULE DAILY, Disp: 90 capsule, Rfl: 3    meloxicam (MOBIC) 7 5 mg tablet, Take 1 tablet (7 5 mg total) by mouth daily as needed for moderate pain, Disp: 90 tablet, Rfl: 1    minoxidil (LONITEN) 2 5 mg tablet, Take 0 5 tablets (1 25 mg total) by mouth daily, Disp: 45 tablet, Rfl: 1    mometasone (NASONEX) 50 mcg/act nasal spray, 2 sprays into each nostril daily, Disp: 51 g, Rfl: 3    multivitamin (THERAGRAN) TABS, Take 1 tablet by mouth daily, Disp: , Rfl:     Allergies   Allergen Reactions    Other Sneezing and Nasal Congestion     SEASONAL ALLERGIES       Physical Exam:    /76 (BP Location: Left arm, Patient Position: Sitting, Cuff Size: Standard)   Pulse 55   Ht 5' 1" (1 549 m)   Wt 60 8 kg (134 lb)   BMI 25 32 kg/m²     Constitutional:normal, well developed, well nourished, alert, in no distress and non-toxic and no overt pain behavior    Eyes:anicteric  HEENT:grossly intact  Neck:supple, symmetric, trachea midline and no masses   Pulmonary:even and unlabored  Cardiovascular:No edema or pitting edema present  Skin:Normal without rashes or lesions and well hydrated  Psychiatric:Mood and affect appropriate  Neurologic:Cranial Nerves II-XII grossly intact  Musculoskeletal:antalgic

## 2022-10-03 ENCOUNTER — OFFICE VISIT (OUTPATIENT)
Dept: PAIN MEDICINE | Facility: CLINIC | Age: 57
End: 2022-10-03
Payer: COMMERCIAL

## 2022-10-03 VITALS
DIASTOLIC BLOOD PRESSURE: 76 MMHG | SYSTOLIC BLOOD PRESSURE: 117 MMHG | WEIGHT: 134 LBS | BODY MASS INDEX: 25.3 KG/M2 | HEIGHT: 61 IN | HEART RATE: 55 BPM

## 2022-10-03 DIAGNOSIS — Z98.890 S/P LUMBAR SPINE OPERATION: Chronic | ICD-10-CM

## 2022-10-03 DIAGNOSIS — M54.16 LUMBAR RADICULOPATHY: ICD-10-CM

## 2022-10-03 DIAGNOSIS — M96.1 LUMBAR POST-LAMINECTOMY SYNDROME: ICD-10-CM

## 2022-10-03 DIAGNOSIS — M48.07 SPINAL STENOSIS OF LUMBOSACRAL REGION: ICD-10-CM

## 2022-10-03 DIAGNOSIS — M15.0 PRIMARY GENERALIZED (OSTEO)ARTHRITIS: ICD-10-CM

## 2022-10-03 DIAGNOSIS — G62.9 NEUROPATHY: ICD-10-CM

## 2022-10-03 DIAGNOSIS — G89.4 CHRONIC PAIN SYNDROME: Primary | ICD-10-CM

## 2022-10-03 PROCEDURE — 99214 OFFICE O/P EST MOD 30 MIN: CPT

## 2022-10-03 RX ORDER — MELOXICAM 7.5 MG/1
7.5 TABLET ORAL DAILY PRN
Qty: 90 TABLET | Refills: 1 | Status: SHIPPED | OUTPATIENT
Start: 2022-10-03

## 2022-10-03 RX ORDER — GABAPENTIN 800 MG/1
800 TABLET ORAL 3 TIMES DAILY
Qty: 270 TABLET | Refills: 1 | Status: SHIPPED | OUTPATIENT
Start: 2022-10-03

## 2022-10-10 NOTE — PROGRESS NOTES
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      Spinal fusion in 2019 with pain after     2 children - no complications with pregnancies, 3 miscarriages - had to suture cervix to hold pregnancies     SUBJECTIVE      Angel Villa is a 62 y o  here for new consultation with me today  The patient is referred by Sheila Brownlee PA-C with rheumatology and the reason for consultation is Neutropenia    In review of the chart and talking with the patient, she has isolated neutropenia documented on previous labs  WBC   Date Value Ref Range Status   09/19/2022 2 97 (L) 4 31 - 10 16 Thousand/uL Final   09/14/2022 2 95 (L) 4 31 - 10 16 Thousand/uL Final   03/10/2022 3 45 (L) 4 31 - 10 16 Thousand/uL Final   11/04/2021 3 54 (L) 4 31 - 10 16 Thousand/uL Final   10/17/2014 4 8 4 3 - 10 2 Thousand/uL Final   08/29/2014 4 2 (L) 4 3 - 10 2 Thousand/uL Final     Neutrophils Absolute   Date Value Ref Range Status   09/19/2022 1 29 (L) 1 85 - 7 62 Thousands/µL Final   09/14/2022 1 30 (L) 1 85 - 7 62 Thousands/µL Final   11/04/2021 1 81 (L) 1 85 - 7 62 Thousands/µL Final   05/08/2021 2 20 1 85 - 7 62 Thousands/µL Final   10/17/2014 2 86 2 00 - 7 50 Thousand/uL Final   08/29/2014 2 45 2 00 - 7 50 Thousand/uL Final       Patient has known osteoarthritis and has been on meloxicam as needed but has not taken this frequently   Avoids dairy and gluten products recommended by rheumatologist  History of Fatty liver since 25s, following with gastroenterology  Acute and Chronic hepatitis testing 2/2019 - nonreactive   HIV 1/2 AG-AB combo nonreactive 08/2020  B12 10/2021  1,043  MAY, RF, Lyme antibody, TSH, CCP all normal  1/19/2021 EBV IgG and EBNA-IgG elevated, past infection   Ultrasound of head and neck soft tissue for localized swelling mass and lump found to have 1 8 x 0 3 x 1 0 cm morphologically normal-appearing lymph node  Patient states that this was enlarged and had fluctuated in size and is tender to palpation  Has not increased in size since examination on 09/14/2022  No fevers, frequent infections, weight loss is intentional, night sweats on occasion but associates it with menopausal symptoms, no profound fatigue  IBS C dx in early 20s on  lynzess     Denies headaches, changes in vision, chest pain, shortness of breath, nausea, vomiting, diarrhea, hematuria, melena, hematochezia  No lower extremity edema bilaterally  Denies any rashes or other skin lesions  Does have bilateral lower extremity pain which she associates from previous spinal surgeries  Following with pain management, Neurology  I have reviewed the relevant past medical, surgical, social and family history  I have also reviewed allergies and medications for this patient  Review of Systems  Review of Systems   All other systems reviewed and are negative  OBJECTIVE     Physical Exam  Vitals:    10/11/22 0858   BP: 130/74   Pulse: 67   Resp: 16   Temp: 98 3 °F (36 8 °C)   SpO2: 99%   Weight: 60 3 kg (133 lb)   Height: 5' 1" (1 549 m)       Physical Exam  Vitals reviewed  Constitutional:       General: She is not in acute distress  Appearance: Normal appearance  She is not ill-appearing  HENT:      Head: Normocephalic and atraumatic  Eyes:      General: No scleral icterus  Cardiovascular:      Rate and Rhythm: Normal rate and regular rhythm  Pulses: Normal pulses  Heart sounds: No murmur heard    Pulmonary:      Effort: Pulmonary effort is normal       Breath sounds: Normal breath sounds  No wheezing or rhonchi  Abdominal:      General: Abdomen is flat  Bowel sounds are normal       Palpations: Abdomen is soft  There is no mass  Tenderness: There is abdominal tenderness (RUQ)  There is no guarding  Musculoskeletal:      Cervical back: Normal range of motion and neck supple  Right lower leg: No edema  Left lower leg: No edema  Lymphadenopathy:      Cervical: Cervical adenopathy (left posterior chain, s/p US no abnormal morphology) present  Skin:     General: Skin is warm and dry  Findings: No bruising  Neurological:      Mental Status: She is alert and oriented to person, place, and time  Imaging  Relevant imaging reviewed in chart  Ultrasound abdomen complete 03/18/2022 no splenomegaly  Gallbladder surgically absent  Liver within normal size  No masses noted  Hepatic mild steatosis noted  Labs  Relevant labs reviewed in chart   ASSESSMENT & PLAN      Diagnosis ICD-10-CM Associated Orders   1  Neutropenia, unspecified type ([facility] [address] )  D70 9 Ambulatory Referral to Hematology / Oncology     Copper Level     Vitamin B12     Folate     Methylmalonic acid, serum     Homocysteine, serum     Iron Panel (Includes Ferritin, Iron Sat%, Iron, and TIBC)     CBC and differential     US abdomen complete   2  RUQ pain  R10 11 US abdomen complete         62 y o  female presenting from referral from Rheumatologist for evaluation of isolated neutropenia  · Discussion  · Discussed with patient that isolated neutropenia could be caused from nutritional deficiency and less likely myeloid neoplasm  · Will 1st review for any substrate deficiencies and can discuss utility of a bone marrow biopsy versus observation at next office visit pending those results  · Having right upper quadrant tenderness on exam, may have small hernia   US abdomen complete ordered to assess RUQ tenderness and to evaluate for splenomegaly as las US abdomen from 3/28/2022  · Encouraged follow up with PCP for RUQ pain as well  Understands if it worsens or does not improve to call PCP or go to hospital if severe or associated with other concerning symptoms  · Plan/Labs  · CBC diff, CMP, iron panel, B12, folate, MMA, homocystine, iron panel    Follow Up  • With me in 2 weeks with labs prior to appointment     All questions were answered to the patient's satisfaction during this encounter  They appreciated and thanked me for spending time with them  The patient knows the contact information for our office and know to reach out for any relevant concerns related to this encounter  For all other listed problems and medical diagnosis in his chart - they are managed by PCP and/or other specialists, which patient acknowledges  With the exception of new findings of RUQ tenderness to palpation on examination, plan discussed and formulated with Dr José Miguel Chew, PA-C  Hematology & Medical Oncology

## 2022-10-11 ENCOUNTER — CONSULT (OUTPATIENT)
Dept: HEMATOLOGY ONCOLOGY | Facility: CLINIC | Age: 57
End: 2022-10-11
Payer: COMMERCIAL

## 2022-10-11 VITALS
BODY MASS INDEX: 25.11 KG/M2 | OXYGEN SATURATION: 99 % | WEIGHT: 133 LBS | RESPIRATION RATE: 16 BRPM | HEART RATE: 67 BPM | TEMPERATURE: 98.3 F | SYSTOLIC BLOOD PRESSURE: 130 MMHG | HEIGHT: 61 IN | DIASTOLIC BLOOD PRESSURE: 74 MMHG

## 2022-10-11 DIAGNOSIS — D70.9 NEUTROPENIA, UNSPECIFIED TYPE (HCC): Primary | ICD-10-CM

## 2022-10-11 DIAGNOSIS — R10.11 RUQ PAIN: ICD-10-CM

## 2022-10-11 PROCEDURE — 99205 OFFICE O/P NEW HI 60 MIN: CPT | Performed by: PHYSICIAN ASSISTANT

## 2022-10-18 ENCOUNTER — HOSPITAL ENCOUNTER (OUTPATIENT)
Dept: ULTRASOUND IMAGING | Facility: HOSPITAL | Age: 57
Discharge: HOME | End: 2022-10-18
Payer: COMMERCIAL

## 2022-10-18 ENCOUNTER — APPOINTMENT (OUTPATIENT)
Dept: LAB | Facility: HOSPITAL | Age: 57
End: 2022-10-18
Payer: COMMERCIAL

## 2022-10-18 DIAGNOSIS — D70.9 NEUTROPENIA, UNSPECIFIED TYPE (HCC): ICD-10-CM

## 2022-10-18 DIAGNOSIS — R10.11 RUQ PAIN: ICD-10-CM

## 2022-10-18 LAB
BASOPHILS # BLD AUTO: 0.02 THOUSANDS/ΜL (ref 0–0.1)
BASOPHILS NFR BLD AUTO: 1 % (ref 0–1)
EOSINOPHIL # BLD AUTO: 0.06 THOUSAND/ΜL (ref 0–0.61)
EOSINOPHIL NFR BLD AUTO: 2 % (ref 0–6)
ERYTHROCYTE [DISTWIDTH] IN BLOOD BY AUTOMATED COUNT: 12.1 % (ref 11.6–15.1)
FERRITIN SERPL-MCNC: 110 NG/ML (ref 8–388)
FOLATE SERPL-MCNC: >20 NG/ML (ref 3.1–17.5)
HCT VFR BLD AUTO: 37.9 % (ref 34.8–46.1)
HCYS SERPL-SCNC: 7.5 UMOL/L (ref 3.7–11.2)
HGB BLD-MCNC: 12.6 G/DL (ref 11.5–15.4)
IMM GRANULOCYTES # BLD AUTO: 0 THOUSAND/UL (ref 0–0.2)
IMM GRANULOCYTES NFR BLD AUTO: 0 % (ref 0–2)
IRON SATN MFR SERPL: 46 % (ref 15–50)
IRON SERPL-MCNC: 126 UG/DL (ref 50–170)
LYMPHOCYTES # BLD AUTO: 1.23 THOUSANDS/ΜL (ref 0.6–4.47)
LYMPHOCYTES NFR BLD AUTO: 41 % (ref 14–44)
MCH RBC QN AUTO: 30.6 PG (ref 26.8–34.3)
MCHC RBC AUTO-ENTMCNC: 33.2 G/DL (ref 31.4–37.4)
MCV RBC AUTO: 92 FL (ref 82–98)
MONOCYTES # BLD AUTO: 0.22 THOUSAND/ΜL (ref 0.17–1.22)
MONOCYTES NFR BLD AUTO: 7 % (ref 4–12)
NEUTROPHILS # BLD AUTO: 1.44 THOUSANDS/ΜL (ref 1.85–7.62)
NEUTS SEG NFR BLD AUTO: 49 % (ref 43–75)
NRBC BLD AUTO-RTO: 0 /100 WBCS
PLATELET # BLD AUTO: 266 THOUSANDS/UL (ref 149–390)
PMV BLD AUTO: 10.2 FL (ref 8.9–12.7)
RBC # BLD AUTO: 4.12 MILLION/UL (ref 3.81–5.12)
TIBC SERPL-MCNC: 271 UG/DL (ref 250–450)
VIT B12 SERPL-MCNC: 807 PG/ML (ref 100–900)
WBC # BLD AUTO: 2.97 THOUSAND/UL (ref 4.31–10.16)

## 2022-10-18 PROCEDURE — 36415 COLL VENOUS BLD VENIPUNCTURE: CPT

## 2022-10-18 PROCEDURE — 82607 VITAMIN B-12: CPT

## 2022-10-18 PROCEDURE — 83090 ASSAY OF HOMOCYSTEINE: CPT

## 2022-10-18 PROCEDURE — 85025 COMPLETE CBC W/AUTO DIFF WBC: CPT

## 2022-10-18 PROCEDURE — 83550 IRON BINDING TEST: CPT

## 2022-10-18 PROCEDURE — 82525 ASSAY OF COPPER: CPT

## 2022-10-18 PROCEDURE — 82746 ASSAY OF FOLIC ACID SERUM: CPT

## 2022-10-18 PROCEDURE — 82728 ASSAY OF FERRITIN: CPT

## 2022-10-18 PROCEDURE — 83540 ASSAY OF IRON: CPT

## 2022-10-18 PROCEDURE — 76700 US EXAM ABDOM COMPLETE: CPT

## 2022-10-18 PROCEDURE — 83918 ORGANIC ACIDS TOTAL QUANT: CPT

## 2022-10-20 LAB — COPPER SERPL-MCNC: 96 UG/DL (ref 80–158)

## 2022-10-22 LAB — METHYLMALONATE SERPL-SCNC: 109 NMOL/L (ref 0–378)

## 2022-11-01 NOTE — PROGRESS NOTES
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      Spinal fusion in 2019 with pain after     2 children - no complications with pregnancies, 3 miscarriages - had to suture cervix to hold pregnancies     SUBJECTIVE      Sharri Huynh is a 62 y o  here for new consultation with me today  The patient is referred by Christophe Marina PA-C with rheumatology and the reason for consultation is Neutropenia    In review of the chart and talking with the patient, she has isolated neutropenia documented on previous labs  WBC   Date Value Ref Range Status   10/18/2022 2 97 (L) 4 31 - 10 16 Thousand/uL Final   09/19/2022 2 97 (L) 4 31 - 10 16 Thousand/uL Final   09/14/2022 2 95 (L) 4 31 - 10 16 Thousand/uL Final   03/10/2022 3 45 (L) 4 31 - 10 16 Thousand/uL Final   10/17/2014 4 8 4 3 - 10 2 Thousand/uL Final   08/29/2014 4 2 (L) 4 3 - 10 2 Thousand/uL Final     Neutrophils Absolute   Date Value Ref Range Status   10/18/2022 1 44 (L) 1 85 - 7 62 Thousands/µL Final   09/19/2022 1 29 (L) 1 85 - 7 62 Thousands/µL Final   09/14/2022 1 30 (L) 1 85 - 7 62 Thousands/µL Final   11/04/2021 1 81 (L) 1 85 - 7 62 Thousands/µL Final   10/17/2014 2 86 2 00 - 7 50 Thousand/uL Final   08/29/2014 2 45 2 00 - 7 50 Thousand/uL Final       Patient has known osteoarthritis and has been on meloxicam as needed but has not taken this frequently   Avoids dairy and gluten products recommended by rheumatologist  History of Fatty liver since 25s, following with gastroenterology  Acute and Chronic hepatitis testing 2/2019 - nonreactive   HIV 1/2 AG-AB combo nonreactive 08/2020  B12 10/2021  1,043  MAY, RF, Lyme antibody, TSH, CCP all normal  1/19/2021 EBV IgG and EBNA-IgG elevated, past infection   Ultrasound of head and neck soft tissue for localized swelling mass and lump found to have 1 8 x 0 3 x 1 0 cm morphologically normal-appearing lymph node  Patient states that this was enlarged and had fluctuated in size and is tender to palpation  Has not increased in size since examination on 09/14/2022  IBS C dx in early 20s on  lynzess     TODAY   cervical lymph note report scanned in chart from 2016 normal at that time  Denies headaches, changes in vision, chest pain, shortness of breath, palpitations, nausea, vomiting, diarrhea, hematuria, melena, hematochezia  No lower extremity edema bilaterally  Denies any rashes or other skin lesions  Does have bilateral lower extremity pain which she associates from previous spinal surgeries  Following with pain management, Neurology  No fevers, frequent infections, weight loss is intentional, night sweats on occasion but associates it with menopausal symptoms, no profound fatigue  Having RUQ pain - superficial, pain to light palpation ongoing since February  Near laparoscopic incision sites from cholecystectomy  I have reviewed the relevant past medical, surgical, social and family history  I have also reviewed allergies and medications for this patient  Review of Systems  Review of Systems   All other systems reviewed and are negative  OBJECTIVE     Physical Exam  Vitals:    11/03/22 0803   BP: 116/60   BP Location: Left arm   Patient Position: Sitting   Cuff Size: Adult   Pulse: 61   Resp: 16   Temp: 97 6 °F (36 4 °C)   SpO2: 98%   Weight: 60 8 kg (134 lb)   Height: 5' 1" (1 549 m)       Physical Exam  Vitals reviewed     Constitutional:       General: She is not in acute distress  Appearance: Normal appearance  She is not ill-appearing  HENT:      Head: Normocephalic and atraumatic  Eyes:      General: No scleral icterus  Cardiovascular:      Rate and Rhythm: Normal rate and regular rhythm  Pulses: Normal pulses  Heart sounds: No murmur heard  Pulmonary:      Effort: Pulmonary effort is normal       Breath sounds: Normal breath sounds  No wheezing or rhonchi  Abdominal:      General: Abdomen is flat  Bowel sounds are normal       Palpations: Abdomen is soft  There is no mass  Tenderness: There is abdominal tenderness (RUQ, light palpation)  There is no guarding  Musculoskeletal:      Cervical back: Normal range of motion and neck supple  Right lower leg: No edema  Left lower leg: No edema  Lymphadenopathy:      Cervical: Cervical adenopathy (left posterior chain, s/p US no abnormal morphology) present  Skin:     General: Skin is warm and dry  Findings: No bruising  Neurological:      Mental Status: She is alert and oriented to person, place, and time  Imaging  Relevant imaging reviewed in chart    Repeat ultrasound abdomen complete 10/18/2022 subtle increased hepatic echogenicity compatible with mild steatosis otherwise no splenomegaly or other abnormality  Ultrasound abdomen complete 03/18/2022 no splenomegaly  Gallbladder surgically absent  Liver within normal size  No masses noted  Hepatic mild steatosis noted      Labs  Relevant labs reviewed in chart     10/18/2022   B12 807   Folate> 20      Homocystine 7 5   Copper 96   Iron panel saturation 46%, TIBC 271, iron 126, ferritin 110     11/04/21 08:38 03/10/22 09:24 09/14/22 07:29 09/19/22 08:37 10/18/22 06:41   WBC 3 54 (L) 3 45 (L) 2 95 (L) 2 97 (L) 2 97 (L)   Red Blood Cell Count 4 21 4 53 4 47 4 04 4 12   Hemoglobin 12 8 13 7 13 5 12 6 12 6   HCT 38 3 39 1 40 4 37 5 37 9   MCV 91 86 90 93 92   MCH 30 4 30 2 30 2 31 2 30 6   MCHC 33 4 35 0 33 4 33 6 33 2 RDW 12 1 12 2 12 3 12 2 12 1   Platelet Count 471 993 259 244 266   MPV 10 2 10 9 10 3 9 7 10 2   nRBC 0  0 0 0   Neutrophils % 50  44 44 49   Immat GRANS % 0  0 0 0   Lymphocytes Relative 40  47 (H) 45 (H) 41   Monocytes Relative 8  6 8 7   Eosinophils 1  2 2 2   Basophils Relative 1  1 1 1   Immature Grans Absolute 0 01  0 01 0 01 0 00   Absolute Neutrophils 1 81 (L)  1 30 (L) 1 29 (L) 1 44 (L)   Lymphocytes Absolute 1 40  1 35 1 35 1 23   Absolute Monocytes 0 27  0 19 0 23 0 22   Absolute Eosinophils 0 03  0 07 0 06 0 06   Basophils Absolute 0 02  0 03 0 03 0 02   (L): Data is abnormally low  (H): Data is abnormally high  ASSESSMENT & PLAN      Diagnosis ICD-10-CM Associated Orders   1  Neutropenia, unspecified type ([facility] [address] )  D70 9 Ambulatory Referral to Interventional Radiology     CBC and differential       62 y o  female presenting from referral from Rheumatologist for evaluation of isolated neutropenia  · Discussion  · Discussed etiologies for neutropenia, patient wishes to proceed with bone marrow biopsy  · Having right upper quadrant tenderness on exam, superficial, unclear etiology  US abdomen complete ordered to assess RUQ tenderness and to evaluate for splenomegaly as las US abdomen from 3/28/2022, this was negative for hepatosplenomegaly but did show mild steatosis  · Encouraged follow up with PCP for RUQ pain as well  May require additional imaging  Understands if it worsens or does not improve to call PCP or go to hospital if severe or associated with other concerning symptoms  · Plan/Labs  · CBC diff prior to appointment with Dr Audelia Severe    Follow Up  • After bone marrow bx with Dr Audelia Severe     All questions were answered to the patient's satisfaction during this encounter  They appreciated and thanked me for spending time with them  The patient knows the contact information for our office and know to reach out for any relevant concerns related to this encounter   For all other listed problems and medical diagnosis in his chart - they are managed by PCP and/or other specialists, which patient acknowledges  With the exception of RUQ tenderness to palpation on examination, plan discussed and formulated with OMA Lee-C  Hematology & Medical Oncology

## 2022-11-01 NOTE — H&P (VIEW-ONLY)
[address] 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      Spinal fusion in 2019 with pain after     2 children - no complications with pregnancies, 3 miscarriages - had to suture cervix to hold pregnancies     SUBJECTIVE      Xu Denny is a 62 y o  here for new consultation with me today  The patient is referred by Shara Harris PA-C with rheumatology and the reason for consultation is Neutropenia    In review of the chart and talking with the patient, she has isolated neutropenia documented on previous labs  WBC   Date Value Ref Range Status   10/18/2022 2 97 (L) 4 31 - 10 16 Thousand/uL Final   09/19/2022 2 97 (L) 4 31 - 10 16 Thousand/uL Final   09/14/2022 2 95 (L) 4 31 - 10 16 Thousand/uL Final   03/10/2022 3 45 (L) 4 31 - 10 16 Thousand/uL Final   10/17/2014 4 8 4 3 - 10 2 Thousand/uL Final   08/29/2014 4 2 (L) 4 3 - 10 2 Thousand/uL Final     Neutrophils Absolute   Date Value Ref Range Status   10/18/2022 1 44 (L) 1 85 - 7 62 Thousands/µL Final   09/19/2022 1 29 (L) 1 85 - 7 62 Thousands/µL Final   09/14/2022 1 30 (L) 1 85 - 7 62 Thousands/µL Final   11/04/2021 1 81 (L) 1 85 - 7 62 Thousands/µL Final   10/17/2014 2 86 2 00 - 7 50 Thousand/uL Final   08/29/2014 2 45 2 00 - 7 50 Thousand/uL Final       Patient has known osteoarthritis and has been on meloxicam as needed but has not taken this frequently   Avoids dairy and gluten products recommended by rheumatologist  History of Fatty liver since 25s, following with gastroenterology  Acute and Chronic hepatitis testing 2/2019 - nonreactive   HIV 1/2 AG-AB combo nonreactive 08/2020  B12 10/2021  1,043  MAY, RF, Lyme antibody, TSH, CCP all normal  1/19/2021 EBV IgG and EBNA-IgG elevated, past infection   Ultrasound of head and neck soft tissue for localized swelling mass and lump found to have 1 8 x 0 3 x 1 0 cm morphologically normal-appearing lymph node  Patient states that this was enlarged and had fluctuated in size and is tender to palpation  Has not increased in size since examination on 09/14/2022  IBS C dx in early 20s on  lynzess     TODAY   cervical lymph note report scanned in chart from 2016 normal at that time  Denies headaches, changes in vision, chest pain, shortness of breath, palpitations, nausea, vomiting, diarrhea, hematuria, melena, hematochezia  No lower extremity edema bilaterally  Denies any rashes or other skin lesions  Does have bilateral lower extremity pain which she associates from previous spinal surgeries  Following with pain management, Neurology  No fevers, frequent infections, weight loss is intentional, night sweats on occasion but associates it with menopausal symptoms, no profound fatigue  Having RUQ pain - superficial, pain to light palpation ongoing since February  Near laparoscopic incision sites from cholecystectomy  I have reviewed the relevant past medical, surgical, social and family history  I have also reviewed allergies and medications for this patient  Review of Systems  Review of Systems   All other systems reviewed and are negative  OBJECTIVE     Physical Exam  Vitals:    11/03/22 0803   BP: 116/60   BP Location: Left arm   Patient Position: Sitting   Cuff Size: Adult   Pulse: 61   Resp: 16   Temp: 97 6 °F (36 4 °C)   SpO2: 98%   Weight: 60 8 kg (134 lb)   Height: 5' 1" (1 549 m)       Physical Exam  Vitals reviewed     Constitutional:       General: She is not in acute distress  Appearance: Normal appearance  She is not ill-appearing  HENT:      Head: Normocephalic and atraumatic  Eyes:      General: No scleral icterus  Cardiovascular:      Rate and Rhythm: Normal rate and regular rhythm  Pulses: Normal pulses  Heart sounds: No murmur heard  Pulmonary:      Effort: Pulmonary effort is normal       Breath sounds: Normal breath sounds  No wheezing or rhonchi  Abdominal:      General: Abdomen is flat  Bowel sounds are normal       Palpations: Abdomen is soft  There is no mass  Tenderness: There is abdominal tenderness (RUQ, light palpation)  There is no guarding  Musculoskeletal:      Cervical back: Normal range of motion and neck supple  Right lower leg: No edema  Left lower leg: No edema  Lymphadenopathy:      Cervical: Cervical adenopathy (left posterior chain, s/p US no abnormal morphology) present  Skin:     General: Skin is warm and dry  Findings: No bruising  Neurological:      Mental Status: She is alert and oriented to person, place, and time  Imaging  Relevant imaging reviewed in chart    Repeat ultrasound abdomen complete 10/18/2022 subtle increased hepatic echogenicity compatible with mild steatosis otherwise no splenomegaly or other abnormality  Ultrasound abdomen complete 03/18/2022 no splenomegaly  Gallbladder surgically absent  Liver within normal size  No masses noted  Hepatic mild steatosis noted      Labs  Relevant labs reviewed in chart     10/18/2022   B12 807   Folate> 20      Homocystine 7 5   Copper 96   Iron panel saturation 46%, TIBC 271, iron 126, ferritin 110     11/04/21 08:38 03/10/22 09:24 09/14/22 07:29 09/19/22 08:37 10/18/22 06:41   WBC 3 54 (L) 3 45 (L) 2 95 (L) 2 97 (L) 2 97 (L)   Red Blood Cell Count 4 21 4 53 4 47 4 04 4 12   Hemoglobin 12 8 13 7 13 5 12 6 12 6   HCT 38 3 39 1 40 4 37 5 37 9   MCV 91 86 90 93 92   MCH 30 4 30 2 30 2 31 2 30 6   MCHC 33 4 35 0 33 4 33 6 33 2 RDW 12 1 12 2 12 3 12 2 12 1   Platelet Count 953 857 259 244 266   MPV 10 2 10 9 10 3 9 7 10 2   nRBC 0  0 0 0   Neutrophils % 50  44 44 49   Immat GRANS % 0  0 0 0   Lymphocytes Relative 40  47 (H) 45 (H) 41   Monocytes Relative 8  6 8 7   Eosinophils 1  2 2 2   Basophils Relative 1  1 1 1   Immature Grans Absolute 0 01  0 01 0 01 0 00   Absolute Neutrophils 1 81 (L)  1 30 (L) 1 29 (L) 1 44 (L)   Lymphocytes Absolute 1 40  1 35 1 35 1 23   Absolute Monocytes 0 27  0 19 0 23 0 22   Absolute Eosinophils 0 03  0 07 0 06 0 06   Basophils Absolute 0 02  0 03 0 03 0 02   (L): Data is abnormally low  (H): Data is abnormally high  ASSESSMENT & PLAN      Diagnosis ICD-10-CM Associated Orders   1  Neutropenia, unspecified type ([facility] [address] )  D70 9 Ambulatory Referral to Interventional Radiology     CBC and differential       62 y o  female presenting from referral from Rheumatologist for evaluation of isolated neutropenia  · Discussion  · Discussed etiologies for neutropenia, patient wishes to proceed with bone marrow biopsy  · Having right upper quadrant tenderness on exam, superficial, unclear etiology  US abdomen complete ordered to assess RUQ tenderness and to evaluate for splenomegaly as las US abdomen from 3/28/2022, this was negative for hepatosplenomegaly but did show mild steatosis  · Encouraged follow up with PCP for RUQ pain as well  May require additional imaging  Understands if it worsens or does not improve to call PCP or go to hospital if severe or associated with other concerning symptoms  · Plan/Labs  · CBC diff prior to appointment with Dr Sasha Singh    Follow Up  • After bone marrow bx with Dr Sasha iSngh     All questions were answered to the patient's satisfaction during this encounter  They appreciated and thanked me for spending time with them  The patient knows the contact information for our office and know to reach out for any relevant concerns related to this encounter   For all other listed problems and medical diagnosis in his chart - they are managed by PCP and/or other specialists, which patient acknowledges  With the exception of RUQ tenderness to palpation on examination, plan discussed and formulated with Dr Mayur Patel PA-C  Hematology & Medical Oncology

## 2022-11-03 ENCOUNTER — OFFICE VISIT (OUTPATIENT)
Dept: HEMATOLOGY ONCOLOGY | Facility: CLINIC | Age: 57
End: 2022-11-03

## 2022-11-03 VITALS
SYSTOLIC BLOOD PRESSURE: 116 MMHG | RESPIRATION RATE: 16 BRPM | HEART RATE: 61 BPM | OXYGEN SATURATION: 98 % | TEMPERATURE: 97.6 F | DIASTOLIC BLOOD PRESSURE: 60 MMHG | HEIGHT: 61 IN | WEIGHT: 134 LBS | BODY MASS INDEX: 25.3 KG/M2

## 2022-11-03 DIAGNOSIS — D70.9 NEUTROPENIA, UNSPECIFIED TYPE (HCC): Primary | ICD-10-CM

## 2022-11-17 ENCOUNTER — TELEPHONE (OUTPATIENT)
Dept: HEMATOLOGY ONCOLOGY | Facility: CLINIC | Age: 57
End: 2022-11-17

## 2022-11-17 NOTE — TELEPHONE ENCOUNTER
Patient called in requesting diagnosis code for the reason for bone marrow biopsy as her insurance is requesting it  Provided per Megha Negron PA-C OV note - D70 9 Neutropenia  No additional questions at this time

## 2022-11-23 NOTE — PRE-PROCEDURE INSTRUCTIONS
Pt was given an arrival time of 9am on 11/29 at [facility] for her bone marrow biopsy  Instructed to have nothing to eat or drink after midnight and to have a ride home after the procedure

## 2022-11-29 ENCOUNTER — HOSPITAL ENCOUNTER (OUTPATIENT)
Dept: CT IMAGING | Facility: HOSPITAL | Age: 57
Discharge: HOME | End: 2022-11-29

## 2022-11-29 VITALS
HEIGHT: 61 IN | RESPIRATION RATE: 20 BRPM | BODY MASS INDEX: 25.14 KG/M2 | HEART RATE: 55 BPM | DIASTOLIC BLOOD PRESSURE: 59 MMHG | TEMPERATURE: 97.7 F | SYSTOLIC BLOOD PRESSURE: 113 MMHG | WEIGHT: 133.16 LBS | OXYGEN SATURATION: 100 %

## 2022-11-29 DIAGNOSIS — D70.9 NEUTROPENIA, UNSPECIFIED TYPE (HCC): ICD-10-CM

## 2022-11-29 LAB
ERYTHROCYTE [DISTWIDTH] IN BLOOD BY AUTOMATED COUNT: 12.3 % (ref 11.6–15.1)
HCT VFR BLD AUTO: 40.8 % (ref 34.8–46.1)
HGB BLD-MCNC: 13.5 G/DL (ref 11.5–15.4)
IMM EOSINOPHIL NFR BLD MANUAL: 1 % (ref 0–6)
LYMPHOCYTES NFR BLD: 38 % (ref 14–44)
MCH RBC QN AUTO: 30.3 PG (ref 26.8–34.3)
MCHC RBC AUTO-ENTMCNC: 33.1 G/DL (ref 31.4–37.4)
MCV RBC AUTO: 92 FL (ref 82–98)
MONOCYTES NFR BLD AUTO: 7 % (ref 4–12)
NEUTS SEG NFR BLD AUTO: 54 % (ref 45–77)
NRBC BLD AUTO-RTO: 0 /100 WBCS
PLATELET # BLD AUTO: 269 THOUSANDS/UL (ref 149–390)
PLATELET BLD QL SMEAR: ADEQUATE
PMV BLD AUTO: 10.1 FL (ref 8.9–12.7)
RBC # BLD AUTO: 4.46 MILLION/UL (ref 3.81–5.12)
RBC MORPH BLD: NORMAL
TOTAL CELLS COUNTED SPEC: 100
WBC # BLD AUTO: 4.92 THOUSAND/UL (ref 4.31–10.16)

## 2022-11-29 RX ORDER — LIDOCAINE HYDROCHLORIDE 10 MG/ML
INJECTION, SOLUTION EPIDURAL; INFILTRATION; INTRACAUDAL; PERINEURAL AS NEEDED
Status: COMPLETED | OUTPATIENT
Start: 2022-11-29 | End: 2022-11-29

## 2022-11-29 RX ORDER — SODIUM CHLORIDE 9 MG/ML
75 INJECTION, SOLUTION INTRAVENOUS CONTINUOUS
Status: DISCONTINUED | OUTPATIENT
Start: 2022-11-29 | End: 2022-12-03 | Stop reason: HOSPADM

## 2022-11-29 RX ORDER — MIDAZOLAM HYDROCHLORIDE 2 MG/2ML
INJECTION, SOLUTION INTRAMUSCULAR; INTRAVENOUS AS NEEDED
Status: COMPLETED | OUTPATIENT
Start: 2022-11-29 | End: 2022-11-29

## 2022-11-29 RX ORDER — FENTANYL CITRATE 50 UG/ML
INJECTION, SOLUTION INTRAMUSCULAR; INTRAVENOUS AS NEEDED
Status: COMPLETED | OUTPATIENT
Start: 2022-11-29 | End: 2022-11-29

## 2022-11-29 RX ADMIN — FENTANYL CITRATE 50 MCG: 50 INJECTION, SOLUTION INTRAMUSCULAR; INTRAVENOUS at 10:42

## 2022-11-29 RX ADMIN — MIDAZOLAM HYDROCHLORIDE 1 MG: 1 INJECTION, SOLUTION INTRAMUSCULAR; INTRAVENOUS at 10:42

## 2022-11-29 RX ADMIN — FENTANYL CITRATE 50 MCG: 50 INJECTION, SOLUTION INTRAMUSCULAR; INTRAVENOUS at 10:49

## 2022-11-29 RX ADMIN — LIDOCAINE HYDROCHLORIDE 5 ML: 10 INJECTION, SOLUTION EPIDURAL; INFILTRATION; INTRACAUDAL; PERINEURAL at 10:49

## 2022-11-29 RX ADMIN — MIDAZOLAM HYDROCHLORIDE 1 MG: 1 INJECTION, SOLUTION INTRAMUSCULAR; INTRAVENOUS at 10:48

## 2022-11-29 NOTE — INTERVAL H&P NOTE
The history and physical were reviewed, along with progress notes, and the patient was examined  There are no changes since it was written      /80   Pulse 71   Temp 98 7 °F (37 1 °C) (Temporal)   Resp 18   Ht 5' 1" (1 549 m)   Wt 60 4 kg (133 lb 2 5 oz)   SpO2 100%   BMI 25 16 kg/m²        Clyde Mariee MD/November 29, 2022/10:28 AM

## 2022-11-29 NOTE — DISCHARGE INSTRUCTIONS
Bone Marrow Biopsy     WHAT YOU NEED TO KNOW:   A bone marrow biopsy is a procedure to remove a small amount of bone marrow from your bone  Bone marrow is the soft tissue inside your bone that helps to make blood cells  The sample is tested for disease or infection  DISCHARGE INSTRUCTIONS:     1  Limit your activities day of biopsy as directed by your doctor  2  Use medication as ordered  3  Return to your normal diet  Small sips of flat soda will help with nausea  4  Remove band-aid or dressing 24 hours after procedure  Contact Interventional Radiology at [phone]     1  Difficulty breathing, nausea or vomiting  2  Chills or fever above 101 F     3  Pain at biopsy site not relieved by medication  4  Develop any redness, swelling, heat, unusual drainage, heavy bruising or bleeding from biopsy site

## 2022-11-29 NOTE — BRIEF OP NOTE (RAD/CATH)
INTERVENTIONAL RADIOLOGY PROCEDURE NOTE    Date: 11/29/2022    Procedure:   Procedure Summary     Date: 11/29/22 Room / Location: [address] [place] [address] CT Scan    Anesthesia Start:  Anesthesia Stop:     Procedure: IR BIOPSY BONE MARROW Diagnosis:       Neutropenia, unspecified type ([facility] [address] )      (Neutropenia, unspecified type ([facility] [address] ))    Scheduled Providers: Mikal Benedict MD Responsible Provider:     Anesthesia Type: Not recorded ASA Status: Not recorded          Preoperative diagnosis:   1  Neutropenia, unspecified type ([facility] [address] )         Postoperative diagnosis: Same  Surgeon: Mikal Benedict MD     Assistant: None  No qualified resident was available  Blood loss: 5 ml    Specimens: 5 ml marrow, 1 bone core     Findings:  Successful image guided bone marrow biopsy obtained from left ilium  Complications: None immediate      Anesthesia: conscious sedation

## 2022-12-06 ENCOUNTER — TELEMEDICINE (OUTPATIENT)
Dept: GASTROENTEROLOGY | Facility: CLINIC | Age: 57
End: 2022-12-06

## 2022-12-06 DIAGNOSIS — R19.09 ABDOMINAL MASS OF OTHER SITE: Primary | ICD-10-CM

## 2022-12-06 DIAGNOSIS — K58.1 IRRITABLE BOWEL SYNDROME WITH CONSTIPATION: Chronic | ICD-10-CM

## 2022-12-06 RX ORDER — LINACLOTIDE 145 UG/1
1 CAPSULE, GELATIN COATED ORAL DAILY
Qty: 90 CAPSULE | Refills: 3 | Status: SHIPPED | OUTPATIENT
Start: 2022-12-06

## 2022-12-06 NOTE — PROGRESS NOTES
Virtual Regular Visit    Verification of patient location:    Patient is located in the following state in which I hold an active license [place]      Assessment/Plan:    1  Irritable bowel syndrome with constipation  Restart Linzess 145mcg daily  She is due routinely for a colonoscopy in 2024  Could do sooner if needed due to number 2    2  Abdominal mass of other site  US was normal  Will plan CT      Problem List Items Addressed This Visit        Digestive    Irritable bowel syndrome (Chronic)    Relevant Medications    linaCLOtide (Linzess) 145 MCG CAPS   Other Visit Diagnoses     Abdominal mass of other site    -  Primary    Relevant Orders    CT abdomen pelvis w contrast               Reason for visit is   Chief Complaint   Patient presents with   • Virtual Regular Visit        Encounter provider Cherylene Fish, PA-C    Provider located at [address] [facility] [address] [facility]  [address] [address] [phone]      Recent Visits  No visits were found meeting these conditions  Showing recent visits within past 7 days and meeting all other requirements  Future Appointments  No visits were found meeting these conditions  Showing future appointments within next 150 days and meeting all other requirements       The patient was identified by name and date of birth  Rey Purvis was informed that this is a telemedicine visit and that the visit is being conducted through the [employer]  She agrees to proceed     My office door was closed  No one else was in the room  She acknowledged consent and understanding of privacy and security of the video platform  The patient has agreed to participate and understands they can discontinue the visit at any time  Patient is aware this is a billable service  Subjective  Rey Purvis is a 62 y o  female irritable bowel syndrome who presents for follow-up of the same    She she tried to wean herself off of Linzess over the past year  Unfortunately, she did develop worsening constipation  She has been using laxatives as needed  Recently she has noted a lump on the right side of her abdomen  She notes that it is next to the bellybutton  She admits that it is painful when she palpates it  She feels as if it is superficial not deep  She had an ultrasound of her abdomen in October which was unremarkable  She has an appointment to follow-up with gynecology as she is concerned it may be related to something there  She denies any change in the lump sensation before or after a bowel movement  She notes that you can only palpate it if she is standing not if she is laying  Her last colonoscopy in 2014 was a normal exam   She is due routinely for a colonoscopy in 2024  [facility]     Past Medical History:   Diagnosis Date   • Abdominal pain    • Arthralgia of pelvic region and thigh    • Chest pain    • CTS (carpal tunnel syndrome)    • Fibromyalgia    • Hepatitis    • Leukocytopenia    • Leukorrhea    • Temporomandibular joint sounds on opening and/or closing the jaw        Past Surgical History:   Procedure Laterality Date   • BACK SURGERY     • CHOLECYSTECTOMY     • FOOT SURGERY Right    • INCONTINENCE SURGERY      vaginal sling for stress incontinence   • IR BIOPSY BONE MARROW  11/29/2022   • LUMBAR FUSION  03/06/2019    [facility]   • SINUS SURGERY     • SPINAL FUSION  3/5/2019   • SPINE SURGERY  03/05/2019       Current Outpatient Medications   Medication Sig Dispense Refill   • linaCLOtide (Linzess) 145 MCG CAPS Take 1 capsule (145 mcg total) by mouth daily 90 capsule 3   • acetaminophen (TYLENOL) 325 mg tablet Take 500 mg by mouth as needed for mild pain     • Ascorbic Acid (Vitamin C ER) 1000 MG TBCR Take 1,000 mg by mouth     • Cholecalciferol 50 MCG (2000 UT) TABS Take 2,000 Units by mouth     • gabapentin (NEURONTIN) 800 mg tablet Take 1 tablet (800 mg total) by mouth 3 (three) times a day 270 tablet 1   • meloxicam (MOBIC) 7 5 mg tablet Take 1 tablet (7 5 mg total) by mouth daily as needed for moderate pain 90 tablet 1   • minoxidil (LONITEN) 2 5 mg tablet Take 0 5 tablets (1 25 mg total) by mouth daily 45 tablet 1   • mometasone (NASONEX) 50 mcg/act nasal spray 2 sprays into each nostril daily 51 g 3   • multivitamin (THERAGRAN) TABS Take 1 tablet by mouth daily       No current facility-administered medications for this visit  Allergies   Allergen Reactions   • Other Sneezing and Nasal Congestion     SEASONAL ALLERGIES       Review of Systems    Video Exam    There were no vitals filed for this visit      Physical Exam     I spent 15 minutes directly with the patient during this visit

## 2022-12-08 LAB
MISCELLANEOUS LAB TEST RESULT: NORMAL
SCAN RESULT: NORMAL

## 2022-12-12 ENCOUNTER — APPOINTMENT (OUTPATIENT)
Dept: LAB | Facility: HOSPITAL | Age: 57
End: 2022-12-12

## 2022-12-12 DIAGNOSIS — D70.9 NEUTROPENIA, UNSPECIFIED TYPE (HCC): ICD-10-CM

## 2022-12-12 LAB
BASOPHILS # BLD AUTO: 0.02 THOUSANDS/ÂΜL (ref 0–0.1)
BASOPHILS NFR BLD AUTO: 1 % (ref 0–1)
EOSINOPHIL # BLD AUTO: 0.06 THOUSAND/ÂΜL (ref 0–0.61)
EOSINOPHIL NFR BLD AUTO: 2 % (ref 0–6)
ERYTHROCYTE [DISTWIDTH] IN BLOOD BY AUTOMATED COUNT: 12.1 % (ref 11.6–15.1)
HCT VFR BLD AUTO: 36.2 % (ref 34.8–46.1)
HGB BLD-MCNC: 12 G/DL (ref 11.5–15.4)
IMM GRANULOCYTES # BLD AUTO: 0.01 THOUSAND/UL (ref 0–0.2)
IMM GRANULOCYTES NFR BLD AUTO: 0 % (ref 0–2)
LYMPHOCYTES # BLD AUTO: 1.29 THOUSANDS/ÂΜL (ref 0.6–4.47)
LYMPHOCYTES NFR BLD AUTO: 41 % (ref 14–44)
MCH RBC QN AUTO: 30.1 PG (ref 26.8–34.3)
MCHC RBC AUTO-ENTMCNC: 33.1 G/DL (ref 31.4–37.4)
MCV RBC AUTO: 91 FL (ref 82–98)
MONOCYTES # BLD AUTO: 0.19 THOUSAND/ÂΜL (ref 0.17–1.22)
MONOCYTES NFR BLD AUTO: 6 % (ref 4–12)
NEUTROPHILS # BLD AUTO: 1.57 THOUSANDS/ÂΜL (ref 1.85–7.62)
NEUTS SEG NFR BLD AUTO: 50 % (ref 43–75)
NRBC BLD AUTO-RTO: 0 /100 WBCS
PLATELET # BLD AUTO: 259 THOUSANDS/UL (ref 149–390)
PMV BLD AUTO: 10 FL (ref 8.9–12.7)
RBC # BLD AUTO: 3.99 MILLION/UL (ref 3.81–5.12)
WBC # BLD AUTO: 3.14 THOUSAND/UL (ref 4.31–10.16)

## 2022-12-13 ENCOUNTER — HOSPITAL ENCOUNTER (OUTPATIENT)
Dept: RADIOLOGY | Facility: MEDICAL CENTER | Age: 57
Discharge: HOME | End: 2022-12-13

## 2022-12-13 DIAGNOSIS — R19.09 ABDOMINAL MASS OF OTHER SITE: ICD-10-CM

## 2022-12-13 RX ADMIN — IOHEXOL 100 ML: 350 INJECTION, SOLUTION INTRAVENOUS at 09:40

## 2022-12-14 ENCOUNTER — OFFICE VISIT (OUTPATIENT)
Dept: HEMATOLOGY ONCOLOGY | Facility: CLINIC | Age: 57
End: 2022-12-14

## 2022-12-14 VITALS
WEIGHT: 133 LBS | HEART RATE: 79 BPM | TEMPERATURE: 98.1 F | SYSTOLIC BLOOD PRESSURE: 128 MMHG | BODY MASS INDEX: 25.11 KG/M2 | RESPIRATION RATE: 18 BRPM | OXYGEN SATURATION: 97 % | HEIGHT: 61 IN | DIASTOLIC BLOOD PRESSURE: 72 MMHG

## 2022-12-14 DIAGNOSIS — D70.9 NEUTROPENIA, UNSPECIFIED TYPE (HCC): Primary | ICD-10-CM

## 2022-12-14 DIAGNOSIS — R89.8 ABNORMAL BONE MARROW EXAMINATION: ICD-10-CM

## 2022-12-14 NOTE — PROGRESS NOTES
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      Spinal fusion in 2019 with pain after     2 children - no complications with pregnancies, 3 miscarriages - had to suture cervix to hold pregnancies     SUBJECTIVE      Cesia Stone is a 62 y o  here for new consultation with me today  The patient is referred by Anibal Johns PA-C with rheumatology and the reason for consultation is Neutropenia    In review of the chart and talking with the patient, she has isolated neutropenia documented on previous labs  WBC   Date Value Ref Range Status   12/12/2022 3 14 (L) 4 31 - 10 16 Thousand/uL Final   11/29/2022 4 92 4 31 - 10 16 Thousand/uL Final   10/18/2022 2 97 (L) 4 31 - 10 16 Thousand/uL Final   09/19/2022 2 97 (L) 4 31 - 10 16 Thousand/uL Final   10/17/2014 4 8 4 3 - 10 2 Thousand/uL Final   08/29/2014 4 2 (L) 4 3 - 10 2 Thousand/uL Final     Neutrophils Absolute   Date Value Ref Range Status   12/12/2022 1 57 (L) 1 85 - 7 62 Thousands/µL Final   10/18/2022 1 44 (L) 1 85 - 7 62 Thousands/µL Final   09/19/2022 1 29 (L) 1 85 - 7 62 Thousands/µL Final   09/14/2022 1 30 (L) 1 85 - 7 62 Thousands/µL Final   10/17/2014 2 86 2 00 - 7 50 Thousand/uL Final   08/29/2014 2 45 2 00 - 7 50 Thousand/uL Final       Patient has known osteoarthritis and has been on meloxicam as needed but has not taken this frequently   Avoids dairy and gluten products recommended by rheumatologist  History of Fatty liver since 25s, following with gastroenterology  Acute and Chronic hepatitis testing 2/2019 - nonreactive   HIV 1/2 AG-AB combo nonreactive 08/2020  B12 10/2021  1,043  MAY, RF, Lyme antibody, TSH, CCP all normal  1/19/2021 EBV IgG and EBNA-IgG elevated, past infection   Ultrasound of head and neck soft tissue for localized swelling mass and lump found to have 1 8 x 0 3 x 1 0 cm morphologically normal-appearing lymph node  Patient states that this was enlarged and had fluctuated in size and is tender to palpation  Has not increased in size since examination on 09/14/2022  IBS C dx in early 20s on  lynzess     TODAY    Denies any f/c/n/v/cp/ap/sob/cough  Denies diarrhea or skin rash  Review of Systems  Review of Systems   All other systems reviewed and are negative  OBJECTIVE     Physical Exam  Vitals:    12/14/22 0837   BP: 128/72   Pulse: 79   Resp: 18   Temp: 98 1 °F (36 7 °C)   SpO2: 97%   Weight: 60 3 kg (133 lb)   Height: 5' 1" (1 549 m)     Physical Exam  Vitals reviewed  Eyes:      General: No scleral icterus  Cardiovascular:      Rate and Rhythm: Normal rate  Pulmonary:      Effort: No respiratory distress  Abdominal:      General: There is no distension  Musculoskeletal:         General: No swelling  Cervical back: Normal range of motion  Skin:     Coloration: Skin is not jaundiced  Neurological:      General: No focal deficit present  Mental Status: She is alert and oriented to person, place, and time  Mental status is at baseline  Imaging  Relevant imaging reviewed in chart    Repeat ultrasound abdomen complete 10/18/2022 subtle increased hepatic echogenicity compatible with mild steatosis otherwise no splenomegaly or other abnormality  Ultrasound abdomen complete 03/18/2022 no splenomegaly  Gallbladder surgically absent  Liver within normal size  No masses noted  Hepatic mild steatosis noted      Labs  Relevant labs reviewed in chart     10/18/2022   B12 807   Folate> 20    Homocystine 7 5   Copper 96   Iron panel saturation 46%, TIBC 271, iron 126, ferritin 110      Addendum 2   [facility] (GenPath#[id number], evaluated by Maya Hartman, PhD, [facility] ):     RESULT SUMMARY: NORMAL     DETECTED GENOMIC ALTERATIONS: No Mutations Identified     PERTINENT NEGATIVE RESULTS:  The following genes are NEGATIVE for clinically relevant mutations  Mutational hotspots and surrounding exonic regions were interrogated for DNA level point mutations and indels (fusions not assayed)  ABL1, ANKRD26, ASXL1, ATRX, BCOR, BCORL1, BRAF, CALR, CBL, CCND2, CDKN2A, CEBPA, CSF3R, CUX1, DDX41, DNMT3A, ETNK1, ETV6, EZH2, FBXW7, FLT3, GATA2, HRAS, IDH1, IDH2, JAK2, KDM6A, KIT, KMT2A, KRAS, MAP2K1, MPL, MYD88, NF1, NPM1, NRAS, PDGFRA, PHF6, PTEN, PTPN11, RUNX1, SETBP1, SF3B1, SRSF2, STAG2, TET2, TP53, U2AF1, WT1, ZRSR2     Congo red stain  No apple green birefringence material is identified on Congo red stain (Block A1)  Addendum electronically signed by Isis Mosher MD on 12/5/2022 at 11:44 AM   Addendum   Cytogenetic studies (PCYBVAL#[id number], evaluated by Yolis Pepe, PhD, [facility] ):     Karyotype:  51,XX[19]     Cytogenetics Analysis:  Metaphases Counted Metaphases Analyzed Metaphases Karyotyped GTG Band level  Culture Type(s)   20 20 3 350-400 24hrU      Interpretation:    A normal female chromosome complement was observed in twenty metaphases analyzed  Within the limits of the technology utilized in this study, no consistent numerical or structural abnormality was identified  Addendum electronically signed by Isis Mosher MD on 12/2/2022 at 12:45 PM   Final Diagnosis   A-C   Bone Marrow, Left Iliac Crest, Core, Clot and Aspirate:  - Normocellular flanagan (50% cellularity) with trilineage hematopoiesis and 4% plasma cells (Kappa to Lambda ratio 2:1), cannot exclude monoclonal gammopathy of undetermined significance (MGUS) versus reactive changes    - No evidence of leukemia or lymphoma   - Adequate iron stores  - No reticulin fibrosis  - Cytogenetic/Molecular study pending      Comment: Protein electrophoresis, serum immunofixation and quantitative immunoglobulins may be a more sensitive methodology for detection of low level plasma cell gammopathies  Electronically signed by Geoffrey Galeazzi, MD on 12/2/2022 at 11:53 AM           ASSESSMENT & PLAN      Diagnosis ICD-10-CM Associated Orders   1  Neutropenia, unspecified type ([facility] [address] )  D70 9 CBC and differential     Protein electrophoresis, serum     Immunoglobulin free LT chains blood     IgG, IgA, IgM      2  Abnormal bone marrow examination  R89 8 CBC and differential     Protein electrophoresis, serum     Immunoglobulin free LT chains blood     IgG, IgA, IgM          62 y o  female initially referred from Rheumatologist for evaluation of isolated neutropenia  · Discussion  · I reviewed her bone marrow biopsy with her which was overall unremarkable and does not show any malignant etiology for her neutropenia  Hence this is a benign etiology for her neutropenia  · Given that there is a possibility of MGUS as detected, I recommend that appropriate work-up was done and she comes back to review results to see if there is a diagnosis of MGUS which would require a more scheduled follow-up  · She prefers to come back in 3 months with all the labs done beforehand  All questions were answered to the patient's satisfaction during this encounter  They appreciated and thanked me for spending time with them  The patient knows the contact information for our office and know to reach out for any relevant concerns related to this encounter  For all other listed problems and medical diagnosis in his chart - they are managed by PCP and/or other specialists, which patient acknowledges  With the exception of RUQ tenderness to palpation on examination, plan discussed and formulated with Dr Vandana Delgado         Hematology & Medical Oncology

## 2022-12-19 ENCOUNTER — TELEPHONE (OUTPATIENT)
Dept: GASTROENTEROLOGY | Facility: CLINIC | Age: 57
End: 2022-12-19

## 2022-12-19 NOTE — TELEPHONE ENCOUNTER
----- Message from Frederick Yousif PA-C sent at 12/18/2022  9:39 AM EST -----  Please advise patient this was normal

## 2023-01-18 DIAGNOSIS — L65.9 HAIR LOSS: ICD-10-CM

## 2023-01-18 RX ORDER — MINOXIDIL 2.5 MG/1
1.25 TABLET ORAL DAILY
Qty: 45 TABLET | Refills: 0 | Status: SHIPPED | OUTPATIENT
Start: 2023-01-18

## 2023-02-15 ENCOUNTER — APPOINTMENT (OUTPATIENT)
Dept: LAB | Facility: HOSPITAL | Age: 58
End: 2023-02-15

## 2023-02-15 DIAGNOSIS — Z79.899 ENCOUNTER FOR LONG-TERM (CURRENT) USE OF OTHER MEDICATIONS: ICD-10-CM

## 2023-02-15 DIAGNOSIS — M15.0 PRIMARY GENERALIZED (OSTEO)ARTHRITIS: ICD-10-CM

## 2023-02-15 LAB
ALBUMIN SERPL BCP-MCNC: 3.9 G/DL (ref 3.5–5)
ALP SERPL-CCNC: 84 U/L (ref 46–116)
ALT SERPL W P-5'-P-CCNC: 40 U/L (ref 12–78)
ANION GAP SERPL CALCULATED.3IONS-SCNC: 9 MMOL/L (ref 4–13)
AST SERPL W P-5'-P-CCNC: 33 U/L (ref 5–45)
BASOPHILS # BLD AUTO: 0.04 THOUSANDS/ÂΜL (ref 0–0.1)
BASOPHILS NFR BLD AUTO: 1 % (ref 0–1)
BILIRUB SERPL-MCNC: 0.61 MG/DL (ref 0.2–1)
BUN SERPL-MCNC: 10 MG/DL (ref 5–25)
CALCIUM SERPL-MCNC: 9.3 MG/DL (ref 8.3–10.1)
CHLORIDE SERPL-SCNC: 104 MMOL/L (ref 96–108)
CO2 SERPL-SCNC: 29 MMOL/L (ref 21–32)
CREAT SERPL-MCNC: 0.73 MG/DL (ref 0.6–1.3)
EOSINOPHIL # BLD AUTO: 0.07 THOUSAND/ÂΜL (ref 0–0.61)
EOSINOPHIL NFR BLD AUTO: 2 % (ref 0–6)
ERYTHROCYTE [DISTWIDTH] IN BLOOD BY AUTOMATED COUNT: 12 % (ref 11.6–15.1)
ERYTHROCYTE [SEDIMENTATION RATE] IN BLOOD: 25 MM/HOUR (ref 0–29)
GFR SERPL CREATININE-BSD FRML MDRD: 91 ML/MIN/1.73SQ M
GLUCOSE P FAST SERPL-MCNC: 98 MG/DL (ref 65–99)
HCT VFR BLD AUTO: 38.6 % (ref 34.8–46.1)
HGB BLD-MCNC: 12.7 G/DL (ref 11.5–15.4)
IMM GRANULOCYTES # BLD AUTO: 0.01 THOUSAND/UL (ref 0–0.2)
IMM GRANULOCYTES NFR BLD AUTO: 0 % (ref 0–2)
LYMPHOCYTES # BLD AUTO: 1.37 THOUSANDS/ÂΜL (ref 0.6–4.47)
LYMPHOCYTES NFR BLD AUTO: 42 % (ref 14–44)
MCH RBC QN AUTO: 29.4 PG (ref 26.8–34.3)
MCHC RBC AUTO-ENTMCNC: 32.9 G/DL (ref 31.4–37.4)
MCV RBC AUTO: 89 FL (ref 82–98)
MONOCYTES # BLD AUTO: 0.23 THOUSAND/ÂΜL (ref 0.17–1.22)
MONOCYTES NFR BLD AUTO: 7 % (ref 4–12)
NEUTROPHILS # BLD AUTO: 1.55 THOUSANDS/ÂΜL (ref 1.85–7.62)
NEUTS SEG NFR BLD AUTO: 48 % (ref 43–75)
NRBC BLD AUTO-RTO: 0 /100 WBCS
PLATELET # BLD AUTO: 273 THOUSANDS/UL (ref 149–390)
PMV BLD AUTO: 10.6 FL (ref 8.9–12.7)
POTASSIUM SERPL-SCNC: 3.8 MMOL/L (ref 3.5–5.3)
PROT SERPL-MCNC: 7.9 G/DL (ref 6.4–8.4)
RBC # BLD AUTO: 4.32 MILLION/UL (ref 3.81–5.12)
SODIUM SERPL-SCNC: 142 MMOL/L (ref 135–147)
WBC # BLD AUTO: 3.27 THOUSAND/UL (ref 4.31–10.16)

## 2023-02-16 ENCOUNTER — APPOINTMENT (OUTPATIENT)
Dept: LAB | Facility: HOSPITAL | Age: 58
End: 2023-02-16

## 2023-02-16 DIAGNOSIS — R82.71 BACTERIA IN URINE: ICD-10-CM

## 2023-02-17 LAB — BACTERIA UR CULT: ABNORMAL

## 2023-02-21 PROBLEM — M96.1 POST LAMINECTOMY SYNDROME: Status: ACTIVE | Noted: 2023-02-21

## 2023-02-21 PROBLEM — M15.4 EROSIVE OSTEOARTHRITIS: Status: ACTIVE | Noted: 2023-02-21

## 2023-02-21 PROBLEM — M06.09 SERONEGATIVE ARTHROPATHY OF MULTIPLE SITES (HCC): Status: ACTIVE | Noted: 2023-02-21

## 2023-02-21 PROBLEM — M19.90 INFLAMMATORY OSTEOARTHRITIS: Status: ACTIVE | Noted: 2023-02-21

## 2023-02-22 ENCOUNTER — TELEPHONE (OUTPATIENT)
Dept: HEMATOLOGY ONCOLOGY | Facility: CLINIC | Age: 58
End: 2023-02-22

## 2023-02-22 NOTE — TELEPHONE ENCOUNTER
Appointment Cancellation Or Reschedule     Person calling in Patient   If someone other than patient calling, are they listed on the communication consent form? N/A   Provider Angela Bhakta PA-C   Office Visit Date and Time  03/22/23 8AM   [facility]   Did patient want to reschedule their office appointment? If so, when was it scheduled to? Yes, 03/23/23 1PM   Did you have STAR scheduled for this appointment? No   Do you need STAR set up for your new appointment? If yes, please send to "PATIENT RIDESHARE" pool for STAR rescheduling N/A   Is this patient calling to reschedule an infusion appointment? No   When is their next infusion appointment? N/A   Is this patient a Chemo patient? No   Reason for Cancellation or Reschedule Provider   Was No show policy reviewed with patient if patient canceling within 24 hours? No, N/A     If the patient is cancelling an appointment and needs their STAR Transport cancelled, please route to [facility]  If the patient is a treatment patient, please route this to the office nurse  If the patient is not on treatment, please route to the Clerical pool based on location  If the patient is a surgical oncology patient, please route to surg/onc clinical pool  Route message as high priority if same day cancellation

## 2023-02-24 DIAGNOSIS — R09.82 POST-NASAL DRIP: ICD-10-CM

## 2023-02-24 RX ORDER — MOMETASONE FUROATE 50 UG/1
SPRAY, METERED NASAL
Qty: 51 G | Refills: 3 | Status: SHIPPED | OUTPATIENT
Start: 2023-02-24

## 2023-03-08 ENCOUNTER — APPOINTMENT (OUTPATIENT)
Dept: LAB | Facility: HOSPITAL | Age: 58
End: 2023-03-08

## 2023-03-08 DIAGNOSIS — D70.9 NEUTROPENIA, UNSPECIFIED TYPE (HCC): ICD-10-CM

## 2023-03-08 DIAGNOSIS — R89.8 ABNORMAL BONE MARROW EXAMINATION: ICD-10-CM

## 2023-03-08 LAB
BASOPHILS # BLD AUTO: 0.04 THOUSANDS/ÂΜL (ref 0–0.1)
BASOPHILS NFR BLD AUTO: 1 % (ref 0–1)
EOSINOPHIL # BLD AUTO: 0.06 THOUSAND/ÂΜL (ref 0–0.61)
EOSINOPHIL NFR BLD AUTO: 2 % (ref 0–6)
ERYTHROCYTE [DISTWIDTH] IN BLOOD BY AUTOMATED COUNT: 12.5 % (ref 11.6–15.1)
HCT VFR BLD AUTO: 37.8 % (ref 34.8–46.1)
HGB BLD-MCNC: 12.4 G/DL (ref 11.5–15.4)
IGA SERPL-MCNC: 334 MG/DL (ref 70–400)
IGG SERPL-MCNC: 1240 MG/DL (ref 700–1600)
IGM SERPL-MCNC: 176 MG/DL (ref 40–230)
IMM GRANULOCYTES # BLD AUTO: 0.01 THOUSAND/UL (ref 0–0.2)
IMM GRANULOCYTES NFR BLD AUTO: 0 % (ref 0–2)
LYMPHOCYTES # BLD AUTO: 1.25 THOUSANDS/ÂΜL (ref 0.6–4.47)
LYMPHOCYTES NFR BLD AUTO: 41 % (ref 14–44)
MCH RBC QN AUTO: 30 PG (ref 26.8–34.3)
MCHC RBC AUTO-ENTMCNC: 32.8 G/DL (ref 31.4–37.4)
MCV RBC AUTO: 92 FL (ref 82–98)
MONOCYTES # BLD AUTO: 0.2 THOUSAND/ÂΜL (ref 0.17–1.22)
MONOCYTES NFR BLD AUTO: 7 % (ref 4–12)
NEUTROPHILS # BLD AUTO: 1.49 THOUSANDS/ÂΜL (ref 1.85–7.62)
NEUTS SEG NFR BLD AUTO: 49 % (ref 43–75)
NRBC BLD AUTO-RTO: 0 /100 WBCS
PLATELET # BLD AUTO: 272 THOUSANDS/UL (ref 149–390)
PMV BLD AUTO: 10.1 FL (ref 8.9–12.7)
RBC # BLD AUTO: 4.13 MILLION/UL (ref 3.81–5.12)
WBC # BLD AUTO: 3.05 THOUSAND/UL (ref 4.31–10.16)

## 2023-03-09 LAB
ALBUMIN SERPL ELPH-MCNC: 4.23 G/DL (ref 3.5–5)
ALBUMIN SERPL ELPH-MCNC: 58 % (ref 52–65)
ALPHA1 GLOB SERPL ELPH-MCNC: 0.28 G/DL (ref 0.1–0.4)
ALPHA1 GLOB SERPL ELPH-MCNC: 3.9 % (ref 2.5–5)
ALPHA2 GLOB SERPL ELPH-MCNC: 0.66 G/DL (ref 0.4–1.2)
ALPHA2 GLOB SERPL ELPH-MCNC: 9 % (ref 7–13)
BETA GLOB ABNORMAL SERPL ELPH-MCNC: 0.38 G/DL (ref 0.4–0.8)
BETA1 GLOB SERPL ELPH-MCNC: 5.2 % (ref 5–13)
BETA2 GLOB SERPL ELPH-MCNC: 6.1 % (ref 2–8)
BETA2+GAMMA GLOB SERPL ELPH-MCNC: 0.45 G/DL (ref 0.2–0.5)
GAMMA GLOB ABNORMAL SERPL ELPH-MCNC: 1.3 G/DL (ref 0.5–1.6)
GAMMA GLOB SERPL ELPH-MCNC: 17.8 % (ref 12–22)
IGG/ALB SER: 1.38 {RATIO} (ref 1.1–1.8)
KAPPA LC FREE SER-MCNC: 18.6 MG/L (ref 3.3–19.4)
KAPPA LC FREE/LAMBDA FREE SER: 1.69 {RATIO} (ref 0.26–1.65)
LAMBDA LC FREE SERPL-MCNC: 11 MG/L (ref 5.7–26.3)
PROT PATTERN SERPL ELPH-IMP: ABNORMAL
PROT SERPL-MCNC: 7.3 G/DL (ref 6.4–8.2)

## 2023-03-16 ENCOUNTER — HOSPITAL ENCOUNTER (OUTPATIENT)
Dept: NEUROLOGY | Facility: CLINIC | Age: 58
End: 2023-03-16

## 2023-03-16 DIAGNOSIS — R20.2 PARESTHESIAS: ICD-10-CM

## 2023-03-23 ENCOUNTER — OFFICE VISIT (OUTPATIENT)
Dept: HEMATOLOGY ONCOLOGY | Facility: CLINIC | Age: 58
End: 2023-03-23

## 2023-03-23 VITALS
BODY MASS INDEX: 25.49 KG/M2 | SYSTOLIC BLOOD PRESSURE: 128 MMHG | OXYGEN SATURATION: 98 % | HEART RATE: 67 BPM | RESPIRATION RATE: 18 BRPM | HEIGHT: 61 IN | TEMPERATURE: 97.6 F | DIASTOLIC BLOOD PRESSURE: 72 MMHG | WEIGHT: 135 LBS

## 2023-03-23 DIAGNOSIS — D70.9 NEUTROPENIA, UNSPECIFIED TYPE (HCC): Primary | ICD-10-CM

## 2023-03-23 NOTE — PROGRESS NOTES
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      Spinal fusion in 2019 with pain after     2 children - no complications with pregnancies, 3 miscarriages - had to suture cervix to hold pregnancies     SUBJECTIVE      Marcelo Gonzalez is a 62 y o  here for new consultation with me today  The patient is referred by Ivory Mcclain PA-C with rheumatology and the reason for consultation is Neutropenia    In review of the chart and talking with the patient, she has isolated neutropenia documented on previous labs  WBC   Date Value Ref Range Status   03/08/2023 3 05 (L) 4 31 - 10 16 Thousand/uL Final   02/15/2023 3 27 (L) 4 31 - 10 16 Thousand/uL Final   12/12/2022 3 14 (L) 4 31 - 10 16 Thousand/uL Final   11/29/2022 4 92 4 31 - 10 16 Thousand/uL Final   10/17/2014 4 8 4 3 - 10 2 Thousand/uL Final   08/29/2014 4 2 (L) 4 3 - 10 2 Thousand/uL Final     Neutrophils Absolute   Date Value Ref Range Status   03/08/2023 1 49 (L) 1 85 - 7 62 Thousands/µL Final   02/15/2023 1 55 (L) 1 85 - 7 62 Thousands/µL Final   12/12/2022 1 57 (L) 1 85 - 7 62 Thousands/µL Final   10/18/2022 1 44 (L) 1 85 - 7 62 Thousands/µL Final   10/17/2014 2 86 2 00 - 7 50 Thousand/uL Final   08/29/2014 2 45 2 00 - 7 50 Thousand/uL Final       Patient has known osteoarthritis and has been on meloxicam as needed but has not taken this frequently   Avoids dairy and gluten products recommended by rheumatologist  History of Fatty liver since 25s, following with gastroenterology  Acute and Chronic hepatitis testing 2/2019 - nonreactive   HIV 1/2 AG-AB combo nonreactive 08/2020  B12 10/2021  1,043  MAY, RF, Lyme antibody, TSH, CCP all normal  1/19/2021 EBV IgG and EBNA-IgG elevated, past infection   Ultrasound of head and neck soft tissue for localized swelling mass and lump found to have 1 8 x 0 3 x 1 0 cm morphologically normal-appearing lymph node  Patient states that this was enlarged and had fluctuated in size and is tender to palpation  Has not increased in size since examination on 09/14/2022  IBS C dx in early 20s on  lynzess     TODAY    Denies any f/c/n/v/cp/ap/sob/cough  Denies diarrhea or skin rash  Denies B sx  Review of Systems  Review of Systems   All other systems reviewed and are negative  OBJECTIVE     Physical Exam  Vitals:    03/23/23 1255   BP: 128/72   BP Location: Left arm   Patient Position: Sitting   Cuff Size: Standard   Pulse: 67   Resp: 18   Temp: 97 6 °F (36 4 °C)   TempSrc: Temporal   SpO2: 98%   Weight: 61 2 kg (135 lb)   Height: 5' 1" (1 549 m)     Physical Exam  Vitals reviewed  Eyes:      General: No scleral icterus  Cardiovascular:      Rate and Rhythm: Normal rate  Heart sounds: Normal heart sounds  Pulmonary:      Effort: Pulmonary effort is normal  No respiratory distress  Musculoskeletal:         General: No swelling  Cervical back: Normal range of motion  Skin:     Coloration: Skin is not jaundiced  Neurological:      General: No focal deficit present  Mental Status: She is alert and oriented to person, place, and time  Mental status is at baseline  Imaging  Relevant imaging reviewed in chart    Repeat ultrasound abdomen complete 10/18/2022 subtle increased hepatic echogenicity compatible with mild steatosis otherwise no splenomegaly or other abnormality  Ultrasound abdomen complete 03/18/2022 no splenomegaly  Gallbladder surgically absent  Liver within normal size    No masses noted  Hepatic mild steatosis noted  Labs  Relevant labs reviewed in chart     10/18/2022   B12 807   Folate> 20      Homocystine 7 5   Copper 96   Iron panel saturation 46%, TIBC 271, iron 126, ferritin 110      Addendum 2   [facility] (GenPath#[id number], evaluated by Bronson Renteria, PhD, [facility] ):     RESULT SUMMARY: NORMAL     DETECTED GENOMIC ALTERATIONS: No Mutations Identified     PERTINENT NEGATIVE RESULTS:  The following genes are NEGATIVE for clinically relevant mutations  Mutational hotspots and surrounding exonic regions were interrogated for DNA level point mutations and indels (fusions not assayed)  ABL1, ANKRD26, ASXL1, ATRX, BCOR, BCORL1, BRAF, CALR, CBL, CCND2, CDKN2A, CEBPA, CSF3R, CUX1, DDX41, DNMT3A, ETNK1, ETV6, EZH2, FBXW7, FLT3, GATA2, HRAS, IDH1, IDH2, JAK2, KDM6A, KIT, KMT2A, KRAS, MAP2K1, MPL, MYD88, NF1, NPM1, NRAS, PDGFRA, PHF6, PTEN, PTPN11, RUNX1, SETBP1, SF3B1, SRSF2, STAG2, TET2, TP53, U2AF1, WT1, ZRSR2     Congo red stain  No apple green birefringence material is identified on Congo red stain (Block A1)  Addendum electronically signed by Philippe Resendez MD on 12/5/2022 at 11:44 AM   Addendum   Cytogenetic studies ([id number]#[id number], evaluated by Manjula Isaacs, PhD, [facility], [facility] ):     Karyotype:  51,XX[19]     Cytogenetics Analysis:  Metaphases Counted Metaphases Analyzed Metaphases Karyotyped GTG Band level  Culture Type(s)   20 20 3 350-400 24hrU      Interpretation:    A normal female chromosome complement was observed in twenty metaphases analyzed  Within the limits of the technology utilized in this study, no consistent numerical or structural abnormality was identified  Addendum electronically signed by Philippe Resendez MD on 12/2/2022 at 12:45 PM   Final Diagnosis   A-C   Bone Marrow, Left Iliac Crest, Core, Clot and Aspirate:  - Normocellular flanagan (50% cellularity) with trilineage hematopoiesis and 4% plasma cells (Kappa to Lambda ratio 2:1), cannot exclude monoclonal gammopathy of undetermined significance (MGUS) versus reactive changes    - No evidence of leukemia or lymphoma   - Adequate iron stores  - No reticulin fibrosis  - Cytogenetic/Molecular study pending      Comment: Protein electrophoresis, serum immunofixation and quantitative immunoglobulins may be a more sensitive methodology for detection of low level plasma cell gammopathies  Electronically signed by Geoffrey Galeazzi, MD on 12/2/2022 at 11:53 AM       3/8/2023  CBC differential ANC greater than 1000, total WBC within baseline fluctuations otherwise normal CBC and differential  SPEP no monoclonal bands noted, total protein 7 3  IgG kappa free light chain 18 6, Ig lambda free light chain 11 0 with ratio of 1 69  IgA 334 0, IgG 1240 0, IgM 176 0    ASSESSMENT & PLAN      Diagnosis ICD-10-CM Associated Orders   1  Neutropenia, unspecified type ([facility] [address] )  D70 9 CBC and differential     CBC and differential     Comprehensive metabolic panel          62 y o  female initially referred from Rheumatologist for evaluation of isolated neutropenia  · Discussion  · I reviewed her bone marrow biopsy with her which was overall unremarkable and does not show any malignant etiology for her neutropenia  Hence this is a benign etiology for her neutropenia  · Given that there is a possibility of MGUS as detected, I recommend that appropriate work-up was done and she comes back to review results to see if there is a diagnosis of MGUS which would require a more scheduled follow-up, this was performed SPEP no monoclonal bands, no elevation in IgG kappa or lambda light chains, no elevation in quantitative immunoglobulins  · I will see patient back in approximately 6 months, if she is more comfortable with labs every 3 months this order has been placed she was nervous that her white blood cell count is still low despite a negative marrow    Stated that we could pursue peripheral LGL studies if needed, but these are not apparent on the bone marrow currently and management would not change so I do not feel as though they are entirely necessary at this point  She was in agreement with this  All questions were answered to the patient's satisfaction during this encounter  They appreciated and thanked me for spending time with them  The patient knows the contact information for our office and know to reach out for any relevant concerns related to this encounter  For all other listed problems and medical diagnosis in his chart - they are managed by PCP and/or other specialists, which patient acknowledges        Hematology & Medical Oncology

## 2023-03-29 ENCOUNTER — TELEPHONE (OUTPATIENT)
Dept: NEUROLOGY | Facility: CLINIC | Age: 58
End: 2023-03-29

## 2023-03-29 NOTE — TELEPHONE ENCOUNTER
I reviewed this previously on 3/16/2023 11:42 AM    Does the patient have a question about the result? Or were you just forwarding the result to me to make sure I got it?

## 2023-03-29 NOTE — TELEPHONE ENCOUNTER
Salvador Zhang MD   Neurology [facility] Clinical Team 6  I have not seen this patient in the past         Previous Messages     ----- Message -----   From: Kaushik Blue MD   Sent: 3/20/2023  11:44 AM EDT   To: Salvador Zhang MD, Rufus Patrick MD     I have not seen this patient  Grace Garcia was the last person to see her  Olivia Wooten to that she was a patient of Dr Sarah Garcia   ----- Message -----   From: Rufus Patrick MD   Sent: 3/17/2023   5:20 PM EDT   To: Salvador Zhang MD, Kaushik Blue MD     I think Dr Dl Martin was trying to send this to you   ----- Message -----   From: Quin Sharma DO   Sent: 3/16/2023   9:09 AM EDT   To: Obi Turner, *     I Performed an EMG/ NCV   she gives a history of dysesthesias pain tingling and numbness radiating down from the back down to the legs   It occurs bilaterally and then radiates to the big toe   It seems to involve the medial aspect of the feet   The patient denies symptoms in other parts of her feet  There is no clinical or electrodiagnostic evidence of a neuropathy   EMG/NCVstudy was normal   I feel that this is more secondary or type of radicular symptoms  It looks like she does follow-up with pain management   She should continue following up with them at this point there is not much from a neurological standpoint that can be offered    ___________________________    Above message forwarded to [facility]    ___________________________    [facility], Please read message above and advise  Thank you!

## 2023-04-03 ENCOUNTER — HOSPITAL ENCOUNTER (OUTPATIENT)
Dept: RADIOLOGY | Facility: HOSPITAL | Age: 58
Discharge: HOME | End: 2023-04-03

## 2023-04-03 DIAGNOSIS — M06.09 SERONEGATIVE ARTHROPATHY OF MULTIPLE SITES (HCC): ICD-10-CM

## 2023-04-03 DIAGNOSIS — M15.4 EROSIVE OSTEOARTHRITIS: ICD-10-CM

## 2023-05-03 DIAGNOSIS — L65.9 HAIR LOSS: ICD-10-CM

## 2023-05-04 RX ORDER — MINOXIDIL 2.5 MG/1
TABLET ORAL
Qty: 45 TABLET | Refills: 3 | Status: SHIPPED | OUTPATIENT
Start: 2023-05-04

## 2023-05-08 ENCOUNTER — TELEPHONE (OUTPATIENT)
Dept: RADIOLOGY | Facility: CLINIC | Age: 58
End: 2023-05-08

## 2023-05-08 NOTE — TELEPHONE ENCOUNTER
Spoke with pt and she states she is in [place] until Sept  I informed pt of message below and pt states she needs her meds  Is a virtual visit possible   Please advise

## 2023-05-08 NOTE — TELEPHONE ENCOUNTER
LUIS Del Rio 12 minutes ago (3:46 PM)    I am not sure why she scheduled her appointment out a year,  typically for gabapentin we should see them in the office every 6 months    Unless Dr Nupur Hansen feels different about this

## 2023-05-09 NOTE — PROGRESS NOTES
Pain Medicine Follow-Up Note    Assessment:  No diagnosis found  Plan:  No orders of the defined types were placed in this encounter  No orders of the defined types were placed in this encounter  My impressions and treatment recommendations were discussed in detail with the patient who verbalized understanding and had no further questions  ***    {PDMP Statement:25552}    {UDS Statement:64504}    {Opioid Statement:91415}    {Pain Management Procedure Risk Statement:05388}    {Oral Swab Statement:77846}    Follow-up is planned in *** time or sooner as warranted  Discharge instructions were provided  I personally saw and examined the patient and I agree with the above discussed plan of care  History of Present Illness:    Servando España is a 62 y o  female who presents to [facility] for interval re-evaluation of the above stated pain complaints  The patient has a past medical and chronic pain history as outlined in the assessment section  {He/she (caps):71882} was last seen on ***     ***    Pain Contract Signed:  ***  Last Urine Drug Screen:  ***    Other than as stated above, the patient denies any interval changes in medications, medical condition, mental condition, symptoms, or allergies since the last office visit           Review of Systems:    Review of Systems      Past Medical History:   Diagnosis Date   • Abdominal pain    • Arthralgia of pelvic region and thigh    • Chest pain    • CTS (carpal tunnel syndrome)    • Fibromyalgia    • Hepatitis    • Leukocytopenia    • Leukorrhea    • Temporomandibular joint sounds on opening and/or closing the jaw        Past Surgical History:   Procedure Laterality Date   • BACK SURGERY     • CHOLECYSTECTOMY     • FOOT SURGERY Right    • INCONTINENCE SURGERY      vaginal sling for stress incontinence   • IR BIOPSY BONE MARROW  11/29/2022   • LUMBAR FUSION  03/06/2019    [facility]   • SINUS SURGERY     • SPINAL FUSION  3/5/2019   • SPINE SURGERY  03/05/2019       Family History   Problem Relation Age of Onset   • Alzheimer's disease Father    • Asthma Father    • Heart attack Father    • COPD Father    • Hypertension Father    • Alzheimer's disease Maternal Grandmother    • Lymphoma Maternal Aunt    • Emphysema Maternal Grandfather        Social History     Occupational History   • Occupation: homemaker   Tobacco Use   • Smoking status: Never   • Smokeless tobacco: Never   Vaping Use   • Vaping Use: Never used   Substance and Sexual Activity   • Alcohol use: No   • Drug use: No   • Sexual activity: Yes     Partners: Male     Birth control/protection: Female Sterilization, None         Current Outpatient Medications:   •  acetaminophen (TYLENOL) 325 mg tablet, Take 500 mg by mouth as needed for mild pain, Disp: , Rfl:   •  Ascorbic Acid (Vitamin C ER) 1000 MG TBCR, Take 1,000 mg by mouth, Disp: , Rfl:   •  Cholecalciferol 50 MCG (2000 UT) TABS, Take 2,000 Units by mouth, Disp: , Rfl:   •  gabapentin (NEURONTIN) 800 mg tablet, Take 1 tablet (800 mg total) by mouth 3 (three) times a day, Disp: 270 tablet, Rfl: 1  •  linaCLOtide (Linzess) 145 MCG CAPS, Take 1 capsule (145 mcg total) by mouth daily, Disp: 90 capsule, Rfl: 3  •  meloxicam (MOBIC) 7 5 mg tablet, Take 1 tablet (7 5 mg total) by mouth daily as needed for moderate pain, Disp: 90 tablet, Rfl: 1  •  minoxidil (LONITEN) 2 5 mg tablet, TAKE ONE-HALF (1/2) TABLET DAILY, Disp: 45 tablet, Rfl: 3  •  mometasone (NASONEX) 50 mcg/act nasal spray, USE 2 SPRAYS IN EACH NOSTRIL DAILY, Disp: 51 g, Rfl: 3  •  multivitamin (THERAGRAN) TABS, Take 1 tablet by mouth daily, Disp: , Rfl:   •  sulfaSALAzine (AZULFIDINE) 500 mg tablet, Take one tab twice daily for 3 weeks, then increase to one tab three times daily for 3 weeks, then increase and stay at 2 tabs twice daily thereafter  Take with food   (Patient not taking: Reported on 3/23/2023), Disp: 120 tablet, Rfl: 3    Allergies   Allergen Reactions   • "Other Sneezing and Nasal Congestion     SEASONAL ALLERGIES       Physical Exam:    There were no vitals taken for this visit  Constitutional:{General Appearance:18515::\"normal, well developed, well nourished, alert, in no distress and non-toxic and no overt pain behavior  \"}  Eyes:{Sclera:73677::\"anicteric\"}  HEENT:{Hearin::\"grossly intact\"}  Neck:{Neck:43758::\"supple, symmetric, trachea midline and no masses \"}  Pulmonary:{Respiratory effort:47531::\"even and unlabored\"}  Cardiovascular:{Examination of Extremities:88414::\"No edema or pitting edema present\"}  Skin:{Skin and Subcutaneous tissues:49152::\"Normal without rashes or lesions and well hydrated\"}  Psychiatric:{Mood and Affect:66759::\"Mood and affect appropriate\"}  Neurologic:{Cranial Nerves:78800::\"Cranial Nerves II-XII grossly intact\"}  Musculoskeletal:{Gair and Station:63495::\"normal\"}      Imaging  No orders to display         No orders of the defined types were placed in this encounter      "

## 2023-05-09 NOTE — TELEPHONE ENCOUNTER
She had a 6 month f/u 4/7 but she cancelled  She is in [place] until Sept   So a virtual be scheduled for now?

## 2023-05-09 NOTE — TELEPHONE ENCOUNTER
I can send a 1 month supply, unless she is in [place] indefinitely? I can do a virtual however this is not typical at spine and pain  Again I am unsure why the patient scheduled her follow-up a year later when at most we would ever do would be 6 months

## 2023-05-12 ENCOUNTER — TELEMEDICINE (OUTPATIENT)
Dept: PAIN MEDICINE | Facility: CLINIC | Age: 58
End: 2023-05-12

## 2023-05-12 DIAGNOSIS — G89.4 CHRONIC PAIN SYNDROME: Primary | ICD-10-CM

## 2023-05-12 DIAGNOSIS — M96.1 LUMBAR POST-LAMINECTOMY SYNDROME: ICD-10-CM

## 2023-05-12 DIAGNOSIS — M54.16 LUMBAR RADICULOPATHY: ICD-10-CM

## 2023-05-12 RX ORDER — GABAPENTIN 800 MG/1
800 TABLET ORAL 3 TIMES DAILY
Qty: 270 TABLET | Refills: 1 | Status: SHIPPED | OUTPATIENT
Start: 2023-05-12

## 2023-05-12 RX ORDER — MELOXICAM 7.5 MG/1
7.5 TABLET ORAL DAILY PRN
Qty: 90 TABLET | Refills: 1 | Status: SHIPPED | OUTPATIENT
Start: 2023-05-12

## 2023-05-12 NOTE — PROGRESS NOTES
Virtual Regular Visit    Verification of patient location:    Patient is located in [place] at a state that I do not hold an active license in  I informed her that future visits will need to take place in a state that I hold an active license either in [place] or [place]  Assessment/Plan:    Problem List Items Addressed This Visit    None  Visit Diagnoses     Chronic pain syndrome    -  Primary    Relevant Medications    gabapentin (NEURONTIN) 800 mg tablet    Lumbar radiculopathy        Relevant Medications    gabapentin (NEURONTIN) 800 mg tablet    Lumbar post-laminectomy syndrome        Relevant Medications    meloxicam (MOBIC) 7 5 mg tablet               Reason for visit is   Chief Complaint   Patient presents with   • Virtual Regular Visit        Encounter provider LUIS Ackerman    Provider located at [address] [place] [id number]      Recent Visits  Date Type Provider Dept   05/12/23 Telemedicine LUIS Ackerman [facility]   Showing recent visits within past 7 days and meeting all other requirements  Future Appointments  No visits were found meeting these conditions  Showing future appointments within next 150 days and meeting all other requirements       The patient was identified by name and date of birth  Inocente Dawson was informed that this is a telemedicine visit and that the visit is being conducted through the [employer]  She agrees to proceed     My office door was closed  No one else was in the room  She acknowledged consent and understanding of privacy and security of the video platform  The patient has agreed to participate and understands they can discontinue the visit at any time  Patient is aware this is a billable service       Subjective  Inocente Dawson is a 62 y o  female who follows up in regards to chronic pain syndrome secondary to lumbar radiculopathy and post laminectomy syndrome  Patient states that her pain symptoms are the same with a pain score of 7 out of 10 on the verbal numeric pain scale  Patient states that are worse at night when she is not moving  Pain is intermittent in nature  The quality of the patient's pain is described as tightness, pins-and-needles, and shooting  Patient states that her pain symptoms are in her bilateral lower legs left being worse than the right  Patient continues to report an overall reduction of her pain and improvement in her level of functioning without significant side effects using gabapentin 800 mg 3 times daily along with meloxicam 7 5 mg tablet daily as needed for pain, therefore I will continue the patient on these medications            Past Medical History:   Diagnosis Date   • Abdominal pain    • Arthralgia of pelvic region and thigh    • Chest pain    • CTS (carpal tunnel syndrome)    • Fibromyalgia    • Hepatitis    • Leukocytopenia    • Leukorrhea    • Temporomandibular joint sounds on opening and/or closing the jaw        Past Surgical History:   Procedure Laterality Date   • BACK SURGERY     • CHOLECYSTECTOMY     • FOOT SURGERY Right    • INCONTINENCE SURGERY      vaginal sling for stress incontinence   • IR BIOPSY BONE MARROW  11/29/2022   • LUMBAR FUSION  03/06/2019    [facility]   • SINUS SURGERY     • SPINAL FUSION  3/5/2019   • SPINE SURGERY  03/05/2019       Current Outpatient Medications   Medication Sig Dispense Refill   • gabapentin (NEURONTIN) 800 mg tablet Take 1 tablet (800 mg total) by mouth 3 (three) times a day 270 tablet 1   • meloxicam (MOBIC) 7 5 mg tablet Take 1 tablet (7 5 mg total) by mouth daily as needed for moderate pain 90 tablet 1   • acetaminophen (TYLENOL) 325 mg tablet Take 500 mg by mouth as needed for mild pain     • Ascorbic Acid (Vitamin C ER) 1000 MG TBCR Take 1,000 mg by mouth     • Cholecalciferol 50 MCG (2000 UT) TABS Take 2,000 Units by mouth     • linaCLOtide (Linzess) 145 MCG CAPS Take 1 capsule (145 mcg total) by mouth daily 90 capsule 3   • minoxidil (LONITEN) 2 5 mg tablet TAKE ONE-HALF (1/2) TABLET DAILY 45 tablet 3   • mometasone (NASONEX) 50 mcg/act nasal spray USE 2 SPRAYS IN EACH NOSTRIL DAILY 51 g 3   • multivitamin (THERAGRAN) TABS Take 1 tablet by mouth daily     • sulfaSALAzine (AZULFIDINE) 500 mg tablet Take one tab twice daily for 3 weeks, then increase to one tab three times daily for 3 weeks, then increase and stay at 2 tabs twice daily thereafter  Take with food  (Patient not taking: Reported on 3/23/2023) 120 tablet 3     No current facility-administered medications for this visit  Allergies   Allergen Reactions   • Other Sneezing and Nasal Congestion     SEASONAL ALLERGIES       Review of Systems   Respiratory: Negative for shortness of breath  Cardiovascular: Negative for chest pain  Gastrointestinal: Negative for constipation, diarrhea, nausea and vomiting  Musculoskeletal: Negative for arthralgias, gait problem, joint swelling and myalgias  Skin: Negative for rash  Neurological: Positive for numbness  Negative for dizziness, seizures and weakness  All other systems reviewed and are negative  Video Exam    There were no vitals filed for this visit  Physical Exam  Constitutional:       Appearance: Normal appearance  She is normal weight  HENT:      Head: Normocephalic  Pulmonary:      Effort: Pulmonary effort is normal    Neurological:      Mental Status: She is alert and oriented to person, place, and time  Psychiatric:         Mood and Affect: Mood normal          Behavior: Behavior normal          Thought Content:  Thought content normal          Judgment: Judgment normal           Visit Time  Total Visit Duration: 12 minutes

## 2023-08-28 ENCOUNTER — APPOINTMENT (OUTPATIENT)
Dept: RADIOLOGY | Facility: CLINIC | Age: 58
End: 2023-08-28
Payer: COMMERCIAL

## 2023-08-28 ENCOUNTER — OFFICE VISIT (OUTPATIENT)
Dept: OBGYN CLINIC | Facility: CLINIC | Age: 58
End: 2023-08-28
Payer: COMMERCIAL

## 2023-08-28 VITALS
BODY MASS INDEX: 25.57 KG/M2 | DIASTOLIC BLOOD PRESSURE: 87 MMHG | SYSTOLIC BLOOD PRESSURE: 152 MMHG | HEART RATE: 60 BPM | HEIGHT: 61 IN | WEIGHT: 135.4 LBS

## 2023-08-28 DIAGNOSIS — M54.42 LOW BACK PAIN WITH BILATERAL SCIATICA, UNSPECIFIED BACK PAIN LATERALITY, UNSPECIFIED CHRONICITY: ICD-10-CM

## 2023-08-28 DIAGNOSIS — M54.41 LOW BACK PAIN WITH BILATERAL SCIATICA, UNSPECIFIED BACK PAIN LATERALITY, UNSPECIFIED CHRONICITY: ICD-10-CM

## 2023-08-28 DIAGNOSIS — Z98.1 HISTORY OF LUMBAR FUSION: Primary | ICD-10-CM

## 2023-08-28 PROCEDURE — 72110 X-RAY EXAM L-2 SPINE 4/>VWS: CPT

## 2023-08-28 PROCEDURE — 99204 OFFICE O/P NEW MOD 45 MIN: CPT | Performed by: ORTHOPAEDIC SURGERY

## 2023-08-28 NOTE — PROGRESS NOTES
[facility] ORTHOPEDIC SPINE SURGERY  Michael Pierce MD  [address] [place] [address]  [phone]    HISTORY OF PRESENT ILLNESS:    Elda Sinha is a 62 y.o. female who presents for initial evaluation of lumbar spine. Patient reports she had two lumbar surgeries done in 2019 in [facility] by Dr. Ro Rivera. Patient states the first surgery was a lumbar fusion. After the first surgery patient reports she had pain going down bilateral lower extremities, so CT of lumbar spine was done and patient was taken back to surgery for lumbar laminectomy. Patient states pain radiating down to the knee resolved after the second surgery. However, ever since surgery patient experiences constant pain and numbness from the knee down to the feet. Patient denies any pain in her back. Patient sees Dr. Mitch De La Paz at pain management. Patient has not had recent injections or physical therapy for her spine. Patient states the last time she did physical therapy was land based right after the surgery.        ALLERGIES:   Allergies   Allergen Reactions   • Other Sneezing and Nasal Congestion     SEASONAL ALLERGIES       MEDICATIONS:    Current Outpatient Medications:   •  acetaminophen (TYLENOL) 325 mg tablet, Take 500 mg by mouth as needed for mild pain, Disp: , Rfl:   •  Ascorbic Acid (Vitamin C ER) 1000 MG TBCR, Take 1,000 mg by mouth, Disp: , Rfl:   •  Cholecalciferol 50 MCG (2000 UT) TABS, Take 2,000 Units by mouth, Disp: , Rfl:   •  gabapentin (NEURONTIN) 800 mg tablet, Take 1 tablet (800 mg total) by mouth 3 (three) times a day, Disp: 270 tablet, Rfl: 1  •  linaCLOtide (Linzess) 145 MCG CAPS, Take 1 capsule (145 mcg total) by mouth daily, Disp: 90 capsule, Rfl: 3  •  meloxicam (MOBIC) 7.5 mg tablet, Take 1 tablet (7.5 mg total) by mouth daily as needed for moderate pain, Disp: 90 tablet, Rfl: 1  •  minoxidil (LONITEN) 2.5 mg tablet, TAKE ONE-HALF (1/2) TABLET DAILY, Disp: 45 tablet, Rfl: 3  •  mometasone (NASONEX) 50 mcg/act nasal spray, USE 2 SPRAYS IN EACH NOSTRIL DAILY, Disp: 51 g, Rfl: 3  •  multivitamin (THERAGRAN) TABS, Take 1 tablet by mouth daily, Disp: , Rfl:   •  sulfaSALAzine (AZULFIDINE) 500 mg tablet, Take one tab twice daily for 3 weeks, then increase to one tab three times daily for 3 weeks, then increase and stay at 2 tabs twice daily thereafter. Take with food. (Patient not taking: Reported on 3/23/2023), Disp: 120 tablet, Rfl: 3     PAST MEDICAL HISTORY:   Past Medical History:   Diagnosis Date   • Abdominal pain    • Arthralgia of pelvic region and thigh    • Chest pain    • CTS (carpal tunnel syndrome)    • Fibromyalgia    • Hepatitis    • Leukocytopenia    • Leukorrhea    • Temporomandibular joint sounds on opening and/or closing the jaw        PAST SURGICAL HISTORY:  Past Surgical History:   Procedure Laterality Date   • BACK SURGERY     • CHOLECYSTECTOMY     • FOOT SURGERY Right    • INCONTINENCE SURGERY      vaginal sling for stress incontinence   • IR BIOPSY BONE MARROW  11/29/2022   • LUMBAR FUSION  03/06/2019    [facility]   • SINUS SURGERY     • SPINAL FUSION  3/5/2019   • SPINE SURGERY  03/05/2019       SOCIAL HISTORY:  Social History     Tobacco Use   Smoking Status Never   Smokeless Tobacco Never          PHYSICAL EXAM:  62 y.o. female sitting comfortably on exam table in no apparent distress. Ambulates with normal gait  Flexion of lumbar spine 10 cm from ground. Full extension. No TTP over thoracic or lumbar spine  5/5 motor strength in bilateral lower extremities  Hypersensitivity to light touch present in bilateral lower extremities circumferentially from knee down to the foot. Absent deep tendon reflexes bilateral lower extremities      RADIOGRAPHIC STUDIES:  1. Xrays, lumbar spine, 8/28/2023: Status post anterior lumbar interbody fusion at L5-S1 with a PEEK there Interbody implant and an antikickback plate. There. is evidence of posterior instrumentation, percutaneous at L5-S1.   Instrumentation is in appropriate position. There is notes hardware loosening. There is no evidence of significant adjacent segment degeneration. 2. EMG, bilateral lower extremities, 3/16/2023: Normal EMG study    3. CT, abdomen and pelvis with contrast, 12/13/2022: Anterior posterior lumbar instrumentation. The fusion is solid at L5-S1. There is evidence of mild degenerative changes adjacent level. There is also evidence of laminectomy at L5-S1. Moderate neuroforaminal stenosis present at L5-S1. There is no evidence of central or lateral recess stenosis at any levels of the lumbar spine. 4. MRI, lumbar spine, 11/04/2019: Status post L5-S1 laminectomy in addition to instrumented fusion. There is no evidence of stenosis. At L4-5 there is evidence of mild lateral recess stenosis due to facet hypertrophy. ASSESSMENT:  1. History of lumbar fusion  -     XR spine lumbar minimum 4 views non injury; Future; Expected date: 08/28/2023        PLAN:  62 y.o. female with lower extremity radicular symptoms. History of lumbar fusion and laminectomy done in 2019. Xrays of lumbar spine were obtained and personally reviewed in the office today. CT and MRI of lumbar spine were also reviewed in the office today. It was discussed that the spine is well fused at this time and there is no evidence of nerve compression seen on EMG or MRI. It may be possible that patient has residual nerve damage, but there is no surgical intervention indicated at this time given constant nature of symptoms and lack of findings on imaging. Patient may trial a course of aqua therapy. However, patient wishes to hold off at this time and will discuss with Dr. Kylie Medrano in the future. I did discuss the treatment option. In addition to aqua therapy, she is a potential candidate for spinal cord stimulation trial.  She apparently has had discussed with Dr. Juanis Terrell and does not want to pursue that option.   Given the fact that the symptoms are constant, it is unlikely that there is a surgical treatment for this condition. If his symptoms do improve in pool, then repeat MRI of the lumbar spine may be indicated to rule out progression of the J segment degeneration. Patient will follow-up as needed.         Scribe Attestation    I,:  Matheus Stevenson PA-C am acting as a scribe while in the presence of the attending physician.:       I,:  Keena Watts MD personally performed the services described in this documentation    as scribed in my presence.:

## 2023-09-13 ENCOUNTER — APPOINTMENT (OUTPATIENT)
Dept: LAB | Facility: HOSPITAL | Age: 58
End: 2023-09-13
Payer: COMMERCIAL

## 2023-09-13 DIAGNOSIS — K76.0 FATTY LIVER: ICD-10-CM

## 2023-09-13 DIAGNOSIS — D70.9 NEUTROPENIA, UNSPECIFIED TYPE (HCC): ICD-10-CM

## 2023-09-13 DIAGNOSIS — Z00.00 ADULT GENERAL MEDICAL EXAMINATION: ICD-10-CM

## 2023-09-13 DIAGNOSIS — M19.90 INFLAMMATORY OSTEOARTHRITIS: Primary | ICD-10-CM

## 2023-09-13 DIAGNOSIS — M06.09 SERONEGATIVE ARTHROPATHY OF MULTIPLE SITES (HCC): ICD-10-CM

## 2023-09-13 DIAGNOSIS — M15.4 EROSIVE OSTEOARTHRITIS: ICD-10-CM

## 2023-09-13 DIAGNOSIS — Z13.6 SCREENING FOR CARDIOVASCULAR CONDITION: ICD-10-CM

## 2023-09-13 LAB
ALBUMIN SERPL BCP-MCNC: 4.5 G/DL (ref 3.5–5)
ALP SERPL-CCNC: 71 U/L (ref 34–104)
ALT SERPL W P-5'-P-CCNC: 22 U/L (ref 7–52)
ANION GAP SERPL CALCULATED.3IONS-SCNC: 4 MMOL/L
AST SERPL W P-5'-P-CCNC: 22 U/L (ref 13–39)
BASOPHILS # BLD AUTO: 0.03 THOUSANDS/ÂΜL (ref 0–0.1)
BASOPHILS NFR BLD AUTO: 1 % (ref 0–1)
BILIRUB DIRECT SERPL-MCNC: 0.08 MG/DL (ref 0–0.2)
BILIRUB SERPL-MCNC: 0.6 MG/DL (ref 0.2–1)
BUN SERPL-MCNC: 15 MG/DL (ref 5–25)
CALCIUM SERPL-MCNC: 10.2 MG/DL (ref 8.4–10.2)
CHLORIDE SERPL-SCNC: 105 MMOL/L (ref 96–108)
CHOLEST SERPL-MCNC: 258 MG/DL
CO2 SERPL-SCNC: 30 MMOL/L (ref 21–32)
CREAT SERPL-MCNC: 0.76 MG/DL (ref 0.6–1.3)
CRP SERPL QL: 1.1 MG/L
EOSINOPHIL # BLD AUTO: 0.07 THOUSAND/ÂΜL (ref 0–0.61)
EOSINOPHIL NFR BLD AUTO: 2 % (ref 0–6)
ERYTHROCYTE [DISTWIDTH] IN BLOOD BY AUTOMATED COUNT: 12.1 % (ref 11.6–15.1)
ERYTHROCYTE [SEDIMENTATION RATE] IN BLOOD: 10 MM/HOUR (ref 0–29)
GFR SERPL CREATININE-BSD FRML MDRD: 86 ML/MIN/1.73SQ M
GLUCOSE P FAST SERPL-MCNC: 95 MG/DL (ref 65–99)
HCT VFR BLD AUTO: 39.1 % (ref 34.8–46.1)
HDLC SERPL-MCNC: 72 MG/DL
HGB BLD-MCNC: 13 G/DL (ref 11.5–15.4)
IMM GRANULOCYTES # BLD AUTO: 0.01 THOUSAND/UL (ref 0–0.2)
IMM GRANULOCYTES NFR BLD AUTO: 0 % (ref 0–2)
LDLC SERPL CALC-MCNC: 162 MG/DL (ref 0–100)
LYMPHOCYTES # BLD AUTO: 1.3 THOUSANDS/ÂΜL (ref 0.6–4.47)
LYMPHOCYTES NFR BLD AUTO: 40 % (ref 14–44)
MCH RBC QN AUTO: 29.8 PG (ref 26.8–34.3)
MCHC RBC AUTO-ENTMCNC: 33.2 G/DL (ref 31.4–37.4)
MCV RBC AUTO: 90 FL (ref 82–98)
MONOCYTES # BLD AUTO: 0.21 THOUSAND/ÂΜL (ref 0.17–1.22)
MONOCYTES NFR BLD AUTO: 7 % (ref 4–12)
NEUTROPHILS # BLD AUTO: 1.6 THOUSANDS/ÂΜL (ref 1.85–7.62)
NEUTS SEG NFR BLD AUTO: 50 % (ref 43–75)
NRBC BLD AUTO-RTO: 0 /100 WBCS
PLATELET # BLD AUTO: 277 THOUSANDS/UL (ref 149–390)
PMV BLD AUTO: 10 FL (ref 8.9–12.7)
POTASSIUM SERPL-SCNC: 4.2 MMOL/L (ref 3.5–5.3)
PROT SERPL-MCNC: 8 G/DL (ref 6.4–8.4)
RBC # BLD AUTO: 4.36 MILLION/UL (ref 3.81–5.12)
SODIUM SERPL-SCNC: 139 MMOL/L (ref 135–147)
TRIGL SERPL-MCNC: 121 MG/DL
WBC # BLD AUTO: 3.22 THOUSAND/UL (ref 4.31–10.16)

## 2023-09-13 PROCEDURE — 86140 C-REACTIVE PROTEIN: CPT

## 2023-09-13 PROCEDURE — 80053 COMPREHEN METABOLIC PANEL: CPT

## 2023-09-13 PROCEDURE — 82248 BILIRUBIN DIRECT: CPT

## 2023-09-13 PROCEDURE — 82955 ASSAY OF G6PD ENZYME: CPT

## 2023-09-13 PROCEDURE — 36415 COLL VENOUS BLD VENIPUNCTURE: CPT

## 2023-09-13 PROCEDURE — 80061 LIPID PANEL: CPT

## 2023-09-13 PROCEDURE — 85652 RBC SED RATE AUTOMATED: CPT

## 2023-09-14 LAB
G6PD BLD QN: 431 U/10E12 RBC (ref 127–427)
RBC # BLD AUTO: 3.23 X10E6/UL (ref 3.77–5.28)

## 2023-09-15 ENCOUNTER — TELEPHONE (OUTPATIENT)
Dept: ADMINISTRATIVE | Facility: OTHER | Age: 58
End: 2023-09-15

## 2023-09-15 NOTE — TELEPHONE ENCOUNTER
Upon review of the In Basket request we were able to locate, review, and update the patient chart as requested for Mammogram.    Any additional questions or concerns should be emailed to the Practice Liaisons via the appropriate education email address, please do not reply via In Basket.     Thank you  Benson Pedraza MA

## 2023-09-15 NOTE — TELEPHONE ENCOUNTER
----- Message from Dinh Garcia, [address] [facility] sent at 9/15/2023  9:07 AM EDT -----  Regarding: MAMMOGRAM  09/15/23 9:07 AM    Hello, our patient Ana Smith has had Mammogram completed/performed. Please assist in updating the patient chart by pulling the Care Everywhere (CE) document. The date of service is 11/01/22.      Thank you,  Alyssa Nascimento MA   PRIMARY [facility] [place]

## 2023-09-22 ENCOUNTER — OFFICE VISIT (OUTPATIENT)
Age: 58
End: 2023-09-22
Payer: COMMERCIAL

## 2023-09-22 VITALS
BODY MASS INDEX: 25.98 KG/M2 | RESPIRATION RATE: 18 BRPM | TEMPERATURE: 97.8 F | HEART RATE: 66 BPM | OXYGEN SATURATION: 99 % | WEIGHT: 137.6 LBS | SYSTOLIC BLOOD PRESSURE: 124 MMHG | DIASTOLIC BLOOD PRESSURE: 72 MMHG | HEIGHT: 61 IN

## 2023-09-22 DIAGNOSIS — E78.00 BORDERLINE HYPERCHOLESTEROLEMIA: Chronic | ICD-10-CM

## 2023-09-22 DIAGNOSIS — Z00.00 ADULT GENERAL MEDICAL EXAMINATION: Primary | ICD-10-CM

## 2023-09-22 PROCEDURE — 99396 PREV VISIT EST AGE 40-64: CPT | Performed by: INTERNAL MEDICINE

## 2023-09-22 NOTE — PROGRESS NOTES
ADULT ANNUAL [address] [facility]    NAME: Matthew Diaz  AGE: 62 y.o. SEX: female  : 1965     DATE: 2023     Assessment and Plan:     Problem List Items Addressed This Visit          Other    Borderline hypercholesterolemia (Chronic)    Relevant Orders    CT coronary calcium score     Other Visit Diagnoses       Adult general medical examination    -  Primary          Reviewed patient's recent lab work. Discussed her cholesterol with her. Discussed her ASCVD risk. Decided to move forward with a coronary calcium scan. Immunizations and preventive care screenings were discussed with patient today. Appropriate education was printed on patient's after visit summary. Counseling:  Alcohol/drug use: discussed moderation in alcohol intake, the recommendations for healthy alcohol use, and avoidance of illicit drug use. Dental Health: discussed importance of regular tooth brushing, flossing, and dental visits. Injury prevention: discussed safety/seat belts, safety helmets, smoke detectors, carbon dioxide detectors, and smoking near bedding or upholstery. Sexual health: discussed sexually transmitted diseases, partner selection, use of condoms, avoidance of unintended pregnancy, and contraceptive alternatives. Exercise: the importance of regular exercise/physical activity was discussed. Recommend exercise 3-5 times per week for at least 30 minutes. Depression Screening and Follow-up Plan: Patient was screened for depression during today's encounter. They screened negative with a PHQ-2 score of 0.       Return in about 1 year (around 2024) for Annual Physical.     Chief Complaint:     Chief Complaint   Patient presents with    Annual Exam      History of Present Illness:     Adult Annual Physical   Patient here for a comprehensive physical exam. The patient has medical conditions that she continues to get monitored by rheumatology and hematology for. Most recent labs and office visits were reviewed. Her cholesterol remains high. She denies any anginal symptoms. No worsening shortness of breath. Depression Screening  PHQ-2/9 Depression Screening    Little interest or pleasure in doing things: 0 - not at all  Feeling down, depressed, or hopeless: 0 - not at all  PHQ-2 Score: 0  PHQ-2 Interpretation: Negative depression screen        Review of Systems:     Review of Systems   Constitutional: Negative. HENT: Negative. Respiratory: Negative. Cardiovascular: Negative. Gastrointestinal: Negative. Genitourinary: Negative. Musculoskeletal:  Positive for arthralgias. Psychiatric/Behavioral: Negative.         Past Medical History:     Past Medical History:   Diagnosis Date    Abdominal pain     Arthralgia of pelvic region and thigh     Chest pain     CTS (carpal tunnel syndrome)     Fibromyalgia     Hepatitis     Leukocytopenia     Leukorrhea     Temporomandibular joint sounds on opening and/or closing the jaw       Past Surgical History:     Past Surgical History:   Procedure Laterality Date    BACK SURGERY      CHOLECYSTECTOMY      FOOT SURGERY Right     INCONTINENCE SURGERY      vaginal sling for stress incontinence    IR BIOPSY BONE MARROW  11/29/2022    LUMBAR FUSION  03/06/2019    [facility]    SINUS SURGERY      SPINAL FUSION  3/5/2019    SPINE SURGERY  03/05/2019      Social History:     Social History     Socioeconomic History    Marital status: /Civil Union     Spouse name: None    Number of children: None    Years of education: None    Highest education level: None   Occupational History    Occupation: homemaker   Tobacco Use    Smoking status: Never    Smokeless tobacco: Never   Vaping Use    Vaping Use: Never used   Substance and Sexual Activity    Alcohol use: No    Drug use: No    Sexual activity: Yes     Partners: Male     Birth control/protection: Female Sterilization, None   Other Topics Concern None   Social History Narrative    None     Social Determinants of Health     Financial Resource Strain: Not on file   Food Insecurity: Not on file   Transportation Needs: Not on file   Physical Activity: Insufficiently Active (8/25/2020)    Exercise Vital Sign     Days of Exercise per Week: 2 days     Minutes of Exercise per Session: 40 min   Stress: No Stress Concern Present (8/25/2020)    [facility]     Feeling of Stress :  Only a little   Social Connections: Not on file   Intimate Partner Violence: Not on file   Housing Stability: Not on file      Family History:     Family History   Problem Relation Age of Onset    Alzheimer's disease Father     Asthma Father     Heart attack Father     COPD Father     Hypertension Father     Alzheimer's disease Maternal Grandmother     Lymphoma Maternal Aunt     Emphysema Maternal Grandfather       Current Medications:     Current Outpatient Medications   Medication Sig Dispense Refill    acetaminophen (TYLENOL) 325 mg tablet Take 500 mg by mouth as needed for mild pain      Ascorbic Acid (Vitamin C ER) 1000 MG TBCR Take 1,000 mg by mouth      Cholecalciferol 50 MCG (2000 UT) TABS Take 2,000 Units by mouth      gabapentin (NEURONTIN) 800 mg tablet Take 1 tablet (800 mg total) by mouth 3 (three) times a day 270 tablet 1    hydroxychloroquine (PLAQUENIL) 200 mg tablet Take 1 tablet (200 mg total) by mouth 2 (two) times a day with meals 180 tablet 1    linaCLOtide (Linzess) 145 MCG CAPS Take 1 capsule (145 mcg total) by mouth daily 90 capsule 3    meloxicam (MOBIC) 7.5 mg tablet Take 1 tablet (7.5 mg total) by mouth daily as needed for moderate pain 90 tablet 1    minoxidil (LONITEN) 2.5 mg tablet TAKE ONE-HALF (1/2) TABLET DAILY 45 tablet 3    mometasone (NASONEX) 50 mcg/act nasal spray USE 2 SPRAYS IN EACH NOSTRIL DAILY 51 g 3    multivitamin (THERAGRAN) TABS Take 1 tablet by mouth daily       No current facility-administered medications for this visit. Allergies: Allergies   Allergen Reactions    Other Sneezing and Nasal Congestion     SEASONAL ALLERGIES      Physical Exam:     /72 (BP Location: Left arm, Patient Position: Sitting, Cuff Size: Standard)   Pulse 66   Temp 97.8 °F (36.6 °C) (Tympanic)   Resp 18   Ht 5' 1" (1.549 m)   Wt 62.4 kg (137 lb 9.6 oz)   SpO2 99%   BMI 26.00 kg/m²     Physical Exam  Vitals reviewed. Constitutional:       General: She is not in acute distress. Appearance: She is well-developed. She is not diaphoretic. Eyes:      General: No scleral icterus. Right eye: No discharge. Left eye: No discharge. Conjunctiva/sclera: Conjunctivae normal.   Neck:      Thyroid: No thyromegaly. Vascular: No JVD. Cardiovascular:      Rate and Rhythm: Normal rate and regular rhythm. Heart sounds: Normal heart sounds. No murmur heard. Pulmonary:      Effort: Pulmonary effort is normal. No respiratory distress. Breath sounds: Normal breath sounds. No wheezing or rales. Abdominal:      General: Bowel sounds are normal. There is no distension. Palpations: Abdomen is soft. Tenderness: There is no abdominal tenderness. Musculoskeletal:      Cervical back: Neck supple. Right lower leg: No edema. Left lower leg: No edema. Lymphadenopathy:      Cervical: No cervical adenopathy. Skin:     General: Skin is warm and dry. Neurological:      Mental Status: She is alert.    Psychiatric:         Mood and Affect: Mood normal.         Behavior: Behavior normal.        Toñito Escalera DO  [address]

## 2023-09-22 NOTE — PATIENT INSTRUCTIONS
Wellness Visit for Adults   AMBULATORY CARE:   A wellness visit  is when you see your healthcare provider to get screened for health problems. Your healthcare provider will also give you advice on how to stay healthy. Write down your questions so you remember to ask them. Ask your healthcare provider how often you should have a wellness visit. What happens at a wellness visit:  Your healthcare provider will ask about your health, and your family history of health problems. This includes high blood pressure, heart disease, and cancer. He or she will ask if you have symptoms that concern you, if you smoke, and about your mood. You may also be asked about your intake of medicines, supplements, food, and alcohol. Any of the following may be done: Your weight  will be checked. Your height may also be checked so your body mass index (BMI) can be calculated. Your BMI shows if you are at a healthy weight. Your blood pressure  and heart rate will be checked. Your temperature may also be checked. Blood and urine tests  may be done. Blood tests may be done to check your cholesterol levels. Abnormal cholesterol levels increase your risk for heart disease and stroke. You may also need a blood or urine test to check for diabetes if you are at increased risk. Urine tests may be done to look for signs of an infection or kidney disease. A physical exam  includes checking your heartbeat and lungs with a stethoscope. Your healthcare provider may also check your skin to look for sun damage. Screening tests  may be recommended. A screening test is done to check for diseases that may not cause symptoms. The screening tests you may need depend on your age, gender, family history, and lifestyle habits. For example, colorectal screening may be recommended if you are 48years old or older. Screening tests you need if you are a woman:   A Pap smear  is used to screen for cervical cancer.  Pap smears are usually done every 3 to 5 years depending on your age. You may need them more often if you have had abnormal Pap smear test results in the past. Ask your healthcare provider how often you should have a Pap smear. A mammogram  is an x-ray of your breasts to screen for breast cancer. Experts recommend mammograms every 2 years starting at age 48 years. You may need a mammogram at age 52 years or younger if you have an increased risk for breast cancer. Talk to your healthcare provider about when you should start having mammograms and how often you need them. Vaccines you may need:   Get an influenza vaccine  every year. The influenza vaccine protects you from the flu. Several types of viruses cause the flu. The viruses change over time, so new vaccines are made each year. Get a tetanus-diphtheria (Td) booster vaccine  every 10 years. This vaccine protects you against tetanus and diphtheria. Tetanus is a severe infection that may cause painful muscle spasms and lockjaw. Diphtheria is a severe bacterial infection that causes a thick covering in the back of your mouth and throat. Get a human papillomavirus (HPV) vaccine  if you are female and aged 23 to 32 or male 23 to 24 and never received it. This vaccine protects you from HPV infection. HPV is the most common infection spread by sexual contact. HPV may also cause vaginal, penile, and anal cancers. Get a pneumococcal vaccine  if you are aged 72 years or older. The pneumococcal vaccine is an injection given to protect you from pneumococcal disease. Pneumococcal disease is an infection caused by pneumococcal bacteria. The infection may cause pneumonia, meningitis, or an ear infection. Get a shingles vaccine  if you are 60 or older, even if you have had shingles before. The shingles vaccine is an injection to protect you from the varicella-zoster virus. This is the same virus that causes chickenpox.  Shingles is a painful rash that develops in people who had chickenpox or have been exposed to the virus. How to eat healthy:  My Plate is a model for planning healthy meals. It shows the types and amounts of foods that should go on your plate. Fruits and vegetables make up about half of your plate, and grains and protein make up the other half. A serving of dairy is included on the side of your plate. The amount of calories and serving sizes you need depends on your age, gender, weight, and height. Examples of healthy foods are listed below:  Eat a variety of vegetables  such as dark green, red, and orange vegetables. You can also include canned vegetables low in sodium (salt) and frozen vegetables without added butter or sauces. Eat a variety of fresh fruits , canned fruit in 100% juice, frozen fruit, and dried fruit. Include whole grains. At least half of the grains you eat should be whole grains. Examples include whole-wheat bread, wheat pasta, brown rice, and whole-grain cereals such as oatmeal.    Eat a variety of protein foods such as seafood (fish and shellfish), lean meat, and poultry without skin (turkey and chicken). Examples of lean meats include pork leg, shoulder, or tenderloin, and beef round, sirloin, tenderloin, and extra lean ground beef. Other protein foods include eggs and egg substitutes, beans, peas, soy products, nuts, and seeds. Choose low-fat dairy products such as skim or 1% milk or low-fat yogurt, cheese, and cottage cheese. Limit unhealthy fats  such as butter, hard margarine, and shortening. Exercise:  Exercise at least 30 minutes per day on most days of the week. Some examples of exercise include walking, biking, dancing, and swimming. You can also fit in more physical activity by taking the stairs instead of the elevator or parking farther away from stores. Include muscle strengthening activities 2 days each week. Regular exercise provides many health benefits.  It helps you manage your weight, and decreases your risk for type 2 diabetes, heart disease, stroke, and high blood pressure. Exercise can also help improve your mood. Ask your healthcare provider about the best exercise plan for you. General health and safety guidelines:   Do not smoke. Nicotine and other chemicals in cigarettes and cigars can cause lung damage. Ask your healthcare provider for information if you currently smoke and need help to quit. E-cigarettes or smokeless tobacco still contain nicotine. Talk to your healthcare provider before you use these products. Limit alcohol. A drink of alcohol is 12 ounces of beer, 5 ounces of wine, or 1½ ounces of liquor. Lose weight, if needed. Being overweight increases your risk of certain health conditions. These include heart disease, high blood pressure, type 2 diabetes, and certain types of cancer. Protect your skin. Do not sunbathe or use tanning beds. Use sunscreen with a SPF 15 or higher. Apply sunscreen at least 15 minutes before you go outside. Reapply sunscreen every 2 hours. Wear protective clothing, hats, and sunglasses when you are outside. Drive safely. Always wear your seatbelt. Make sure everyone in your car wears a seatbelt. A seatbelt can save your life if you are in an accident. Do not use your cell phone when you are driving. This could distract you and cause an accident. Pull over if you need to make a call or send a text message. Practice safe sex. Use latex condoms if are sexually active and have more than one partner. Your healthcare provider may recommend screening tests for sexually transmitted infections (STIs). Wear helmets, lifejackets, and protective gear. Always wear a helmet when you ride a bike or motorcycle, go skiing, or play sports that could cause a head injury. Wear protective equipment when you play sports. Wear a lifejacket when you are on a boat or doing water sports.     © Copyright [facility] 2023 Information is for End User's use only and may not be sold, redistributed or otherwise used for commercial purposes. The above information is an  only. It is not intended as medical advice for individual conditions or treatments. Talk to your doctor, nurse or pharmacist before following any medical regimen to see if it is safe and effective for you.

## 2023-09-25 ENCOUNTER — TELEPHONE (OUTPATIENT)
Dept: HEMATOLOGY ONCOLOGY | Facility: CLINIC | Age: 58
End: 2023-09-25

## 2023-09-25 ENCOUNTER — TELEMEDICINE (OUTPATIENT)
Dept: HEMATOLOGY ONCOLOGY | Facility: CLINIC | Age: 58
End: 2023-09-25
Payer: COMMERCIAL

## 2023-09-25 DIAGNOSIS — D70.9 NEUTROPENIA, UNSPECIFIED TYPE (HCC): Primary | ICD-10-CM

## 2023-09-25 DIAGNOSIS — R68.81 EARLY SATIETY: ICD-10-CM

## 2023-09-25 PROCEDURE — 99213 OFFICE O/P EST LOW 20 MIN: CPT | Performed by: PHYSICIAN ASSISTANT

## 2023-09-25 NOTE — PROGRESS NOTES
Virtual Regular Visit    Verification of patient location: Home    Patient is located at Home in the following state in which I hold an active license [place]      Assessment/Plan:    Problem List Items Addressed This Visit        Other    Neutropenia ([address]) - Primary    Relevant Orders    CBC and differential    US abdomen complete   Other Visit Diagnoses     Early satiety        Relevant Orders    US abdomen complete        G6PD Testing: per rheumatology as patient was started on sulfasalazine, medication dc regardless as she had GI upset. No need for repeat testing as G6PD elevated. Neutropenia: stable, no b sx. Hx bmbx 11/2022 normal however mgus not excluded, f/u myeloma labs unrevealing, no mspike on spep. Continue to monitor. F/u 6 months labs every 3. Having early satiety, worsened over past month, will order US abdomen to exclude hepatosplenomegaly. Will be starting plaquenil, made patient aware of risk of progressive leukopenia and need for close monitoring. Reason for visit is No chief complaint on file.      Component Ref Range & Units 9/13/23 0827 3/8/23 0414 2/15/23 0804 12/12/22 0721 11/29/22 0930 11/29/22 0930 10/18/22 0641   WBC 4.31 - 10.16 Thousand/uL 3.22 Low   3.05 Low   3.27 Low   3.14 Low    4.92  2.97 Low     RBC 3.81 - 5.12 Million/uL 4.36  4.13  4.32  3.99   4.46  4.12    Hemoglobin 11.5 - 15.4 g/dL 13.0  12.4  12.7  12.0   13.5  12.6    Hematocrit 34.8 - 46.1 % 39.1  37.8  38.6  36.2   40.8  37.9    MCV 82 - 98 fL 90  92  89  91   92  92    MCH 26.8 - 34.3 pg 29.8  30.0  29.4  30.1   30.3  30.6    MCHC 31.4 - 37.4 g/dL 33.2  32.8  32.9  33.1   33.1  33.2    RDW 11.6 - 15.1 % 12.1  12.5  12.0  12.1   12.3  12.1    MPV 8.9 - 12.7 fL 10.0  10.1  10.6  10.0   10.1  10.2    Platelets 439 - 980 Thousands/uL 277  272  273  259   269  266    nRBC /100 WBCs 0  0  0  0   0  0    Neutrophils Relative 43 - 75 % 50  49  48  50    49    Immat GRANS % 0 - 2 % 0  0  0  0    0    Lymphocytes Relative 14 - 44 % 40  41  42  41    41    Monocytes Relative 4 - 12 % 7  7  7  6    7    Eosinophils Relative 0 - 6 % 2  2  2  2  1   2    Basophils Relative 0 - 1 % 1  1  1  1    1    Neutrophils Absolute 1.85 - 7.62 Thousands/µL 1.60 Low   1.49 Low   1.55 Low   1.57 Low     1.44 Low     Immature Grans Absolute 0.00 - 0.20 Thousand/uL 0.01  0.01  0.01  0.01    0.00    Lymphocytes Absolute 0.60 - 4.47 Thousands/µL 1.30  1.25  1.37  1.29    1.23    Monocytes Absolute 0.17 - 1.22 Thousand/µL 0.21  0.20  0.23  0.19    0.22    Eosinophils Absolute 0.00 - 0.61 Thousand/µL 0.07  0.06  0.07  0.06    0.06    Basophils Absolute 0.00 - 0.10 Thousands/µL 0.03  0.04  0.04  0.02         CMP unremarkable 9/13/23. Encounter provider Robinson Arrington PA-C    Provider located at [address] [place] [id number]      Recent Visits  Date Type Provider Dept   09/22/23 Office Visit Rolando Chase DO [facility] [place]   Showing recent visits within past 7 days and meeting all other requirements  Today's Visits  Date Type Provider Dept   09/25/23 Telemedicine Milena Riddle PA-C [facility]   Showing today's visits and meeting all other requirements  Future Appointments  No visits were found meeting these conditions. Showing future appointments within next 150 days and meeting all other requirements       The patient was identified by name and date of birth. Camilla Eldridge was informed that this is a telemedicine visit and that the visit is being conducted through the [employer]. She agrees to proceed. .  Other methods to assure confidentiality were taken home office door closed no one else around. She acknowledged consent and understanding of privacy and security of the video platform. The patient has agreed to participate and understands they can discontinue the visit at any time.     Patient is aware this is a billable service. HENRRY Lara is a 62 y.o. here for new consultation with me today. The patient is referred by Litzy Magdaleno PA-C with rheumatology and the reason for consultation is Neutropenia     In review of the chart and talking with the patient, she has isolated neutropenia documented on previous labs.      WBC  Date Value Ref Range Status  03/08/2023 3.05 (L) 4.31 - 10.16 Thousand/uL Final  02/15/2023 3.27 (L) 4.31 - 10.16 Thousand/uL Final  12/12/2022 3.14 (L) 4.31 - 10.16 Thousand/uL Final  11/29/2022 4.92 4.31 - 10.16 Thousand/uL Final  10/17/2014 4.8 4.3 - 10.2 Thousand/uL Final  08/29/2014 4.2 (L) 4.3 - 10.2 Thousand/uL Final     Neutrophils Absolute  Date Value Ref Range Status  03/08/2023 1.49 (L) 1.85 - 7.62 Thousands/µL Final  02/15/2023 1.55 (L) 1.85 - 7.62 Thousands/µL Final  12/12/2022 1.57 (L) 1.85 - 7.62 Thousands/µL Final  10/18/2022 1.44 (L) 1.85 - 7.62 Thousands/µL Final  10/17/2014 2.86 2.00 - 7.50 Thousand/uL Final  08/29/2014 2.45 2.00 - 7.50 Thousand/uL Final        Patient has known osteoarthritis and has been on meloxicam as needed but has not taken this frequently. Avoids dairy and gluten products recommended by rheumatologist  History of Fatty liver since 25s, following with gastroenterology. Acute and Chronic hepatitis testing 2/2019 - nonreactive   HIV 1/2 AG-AB combo nonreactive 08/2020  B12 10/2021  1,043  MAY, RF, Lyme antibody, TSH, CCP all normal. 1/19/2021 EBV IgG and EBNA-IgG elevated, past infection .      Ultrasound of head and neck soft tissue for localized swelling mass and lump found to have 1.8 x 0.3 x 1.0 cm morphologically normal-appearing lymph node. Patient states that this was enlarged and had fluctuated in size and is tender to palpation. Has not increased in size since examination on 09/14/2022. IBS C dx in early 20s on  lynzess      TODAY    Again today patient denies any f/c/n/v/d/c/cp/ap/sob/cough/rashes or b sx. Past Medical History:   Diagnosis Date   • Abdominal pain    • Arthralgia of pelvic region and thigh    • Chest pain    • CTS (carpal tunnel syndrome)    • Fibromyalgia    • Hepatitis    • Leukocytopenia    • Leukorrhea    • Temporomandibular joint sounds on opening and/or closing the jaw        Past Surgical History:   Procedure Laterality Date   • BACK SURGERY     • CHOLECYSTECTOMY     • FOOT SURGERY Right    • INCONTINENCE SURGERY      vaginal sling for stress incontinence   • IR BIOPSY BONE MARROW  11/29/2022   • LUMBAR FUSION  03/06/2019    [facility]   • SINUS SURGERY     • SPINAL FUSION  3/5/2019   • SPINE SURGERY  03/05/2019       Current Outpatient Medications   Medication Sig Dispense Refill   • acetaminophen (TYLENOL) 325 mg tablet Take 500 mg by mouth as needed for mild pain     • Ascorbic Acid (Vitamin C ER) 1000 MG TBCR Take 1,000 mg by mouth     • Cholecalciferol 50 MCG (2000 UT) TABS Take 2,000 Units by mouth     • gabapentin (NEURONTIN) 800 mg tablet Take 1 tablet (800 mg total) by mouth 3 (three) times a day 270 tablet 1   • hydroxychloroquine (PLAQUENIL) 200 mg tablet Take 1 tablet (200 mg total) by mouth 2 (two) times a day with meals 180 tablet 1   • linaCLOtide (Linzess) 145 MCG CAPS Take 1 capsule (145 mcg total) by mouth daily 90 capsule 3   • meloxicam (MOBIC) 7.5 mg tablet Take 1 tablet (7.5 mg total) by mouth daily as needed for moderate pain 90 tablet 1   • minoxidil (LONITEN) 2.5 mg tablet TAKE ONE-HALF (1/2) TABLET DAILY 45 tablet 3   • mometasone (NASONEX) 50 mcg/act nasal spray USE 2 SPRAYS IN EACH NOSTRIL DAILY 51 g 3   • multivitamin (THERAGRAN) TABS Take 1 tablet by mouth daily       No current facility-administered medications for this visit. Allergies   Allergen Reactions   • Other Sneezing and Nasal Congestion     SEASONAL ALLERGIES       Review of Systems   All other systems reviewed and are negative. Video Exam    There were no vitals filed for this visit.     Physical Exam  Constitutional:       Appearance: Normal appearance. HENT:      Head: Normocephalic and atraumatic. Pulmonary:      Effort: Pulmonary effort is normal. No respiratory distress. Neurological:      Mental Status: She is alert and oriented to person, place, and time.    Psychiatric:         Mood and Affect: Mood normal.         Behavior: Behavior normal.          Visit Time  Total Visit Duration: 15 minutes

## 2023-09-25 NOTE — TELEPHONE ENCOUNTER
Called patient with new US Abdomen appt scheduled for October and also with 6 mo f/u .  Patient confirmed

## 2023-10-04 ENCOUNTER — HOSPITAL ENCOUNTER (OUTPATIENT)
Dept: ULTRASOUND IMAGING | Facility: HOSPITAL | Age: 58
Discharge: HOME | End: 2023-10-04
Payer: COMMERCIAL

## 2023-10-04 DIAGNOSIS — R68.81 EARLY SATIETY: ICD-10-CM

## 2023-10-04 DIAGNOSIS — D70.9 NEUTROPENIA, UNSPECIFIED TYPE (HCC): ICD-10-CM

## 2023-10-04 PROCEDURE — 76700 US EXAM ABDOM COMPLETE: CPT

## 2023-11-14 NOTE — PROGRESS NOTES
Pain Medicine Follow-Up Note    Assessment:  1. Chronic pain syndrome    2. Lumbar radiculopathy    3. Lumbar post-laminectomy syndrome        Plan:    New Medications Ordered This Visit   Medications    gabapentin (NEURONTIN) 800 mg tablet     Sig: Take 1 tablet (800 mg total) by mouth 3 (three) times a day     Dispense:  270 tablet     Refill:  1    meloxicam (MOBIC) 7.5 mg tablet     Sig: Take 1 tablet (7.5 mg total) by mouth daily as needed for moderate pain     Dispense:  90 tablet     Refill:  1       My impressions and treatment recommendations were discussed in detail with the patient who verbalized understanding and had no further questions. The patient continues to report an overall reduction of her pain level and improvement with her functioning without significant side effects using gabapentin 3 times daily and meloxicam 7.5 mg daily as needed for moderate pain which she reports is every day, therefore I will continue the patient on this medication. The patient was cautioned about possible side effects and risks of NSAID medications including stomach upset, stomach ulcers, kidney risks. Recent labs reviewed. Patient does not wish to make any changes to her regimen at this time. Follow-up is planned in 6 months time or sooner as warranted. Discharge instructions were provided. I personally saw and examined the patient and I agree with the above discussed plan of care. History of Present Illness:    Matthew Diaz is a 62 y.o. female who presents to [address] [place] [address] [facility] for interval re-evaluation of the above stated pain complaints. The patient has a past medical and chronic pain history as outlined in the assessment section. She was last seen on 5/12/2023. At today's visit patient states that their pain symptoms are the same with a pain score of 7/10 on the verbal numeric pain scale. The patient's pain is worse at night. The patient's pain is constant in nature.   And the quality of the patient's pain is described as throbbing, cramping, pressure-like, shooting, numbness, and pins-and-needles. The patient's pain is located in the bilateral lower legs. Patient states the amount of pain relief she is now obtaining from her current pain relievers is enough to make a difference in her life by reducing her pain symptoms by 50%. Patient denies any side effects using gabapentin or meloxicam.    Other than as stated above, the patient denies any interval changes in medications, medical condition, mental condition, symptoms, or allergies since the last office visit. Review of Systems:    Review of Systems   Respiratory:  Negative for shortness of breath. Cardiovascular:  Negative for chest pain. Gastrointestinal:  Positive for constipation. Negative for diarrhea, nausea and vomiting. Musculoskeletal:  Positive for arthralgias and gait problem. Negative for joint swelling and myalgias. Pain in lower extremity    Skin:  Negative for rash. Neurological:  Negative for dizziness, seizures and weakness. All other systems reviewed and are negative.         Past Medical History:   Diagnosis Date    Abdominal pain     Arthralgia of pelvic region and thigh     Chest pain     CTS (carpal tunnel syndrome)     Fibromyalgia     Hepatitis     Leukocytopenia     Leukorrhea     Temporomandibular joint sounds on opening and/or closing the jaw        Past Surgical History:   Procedure Laterality Date    BACK SURGERY      CHOLECYSTECTOMY      FOOT SURGERY Right     INCONTINENCE SURGERY      vaginal sling for stress incontinence    IR BIOPSY BONE MARROW  11/29/2022    LUMBAR FUSION  03/06/2019    [facility]    SINUS SURGERY      SPINAL FUSION  3/5/2019    SPINE SURGERY  03/05/2019       Family History   Problem Relation Age of Onset    Alzheimer's disease Father     Asthma Father     Heart attack Father     COPD Father     Hypertension Father     Alzheimer's disease Maternal Grandmother Lymphoma Maternal Aunt     Emphysema Maternal Grandfather        Social History     Occupational History    Occupation: homemaker   Tobacco Use    Smoking status: Never    Smokeless tobacco: Never   Vaping Use    Vaping Use: Never used   Substance and Sexual Activity    Alcohol use: No    Drug use: No    Sexual activity: Yes     Partners: Male     Birth control/protection: Female Sterilization, None         Current Outpatient Medications:     acetaminophen (TYLENOL) 325 mg tablet, Take 500 mg by mouth as needed for mild pain, Disp: , Rfl:     Ascorbic Acid (Vitamin C ER) 1000 MG TBCR, Take 1,000 mg by mouth, Disp: , Rfl:     Cholecalciferol 50 MCG (2000 UT) TABS, Take 2,000 Units by mouth, Disp: , Rfl:     gabapentin (NEURONTIN) 800 mg tablet, Take 1 tablet (800 mg total) by mouth 3 (three) times a day, Disp: 270 tablet, Rfl: 1    hydroxychloroquine (PLAQUENIL) 200 mg tablet, Take 1 tablet (200 mg total) by mouth 2 (two) times a day with meals, Disp: 180 tablet, Rfl: 1    linaCLOtide (Linzess) 145 MCG CAPS, Take 1 capsule (145 mcg total) by mouth daily, Disp: 90 capsule, Rfl: 3    meloxicam (MOBIC) 7.5 mg tablet, Take 1 tablet (7.5 mg total) by mouth daily as needed for moderate pain, Disp: 90 tablet, Rfl: 1    minoxidil (LONITEN) 2.5 mg tablet, TAKE ONE-HALF (1/2) TABLET DAILY, Disp: 45 tablet, Rfl: 3    mometasone (NASONEX) 50 mcg/act nasal spray, USE 2 SPRAYS IN EACH NOSTRIL DAILY, Disp: 51 g, Rfl: 3    multivitamin (THERAGRAN) TABS, Take 1 tablet by mouth daily, Disp: , Rfl:     Allergies   Allergen Reactions    Other Sneezing and Nasal Congestion     SEASONAL ALLERGIES       Physical Exam:    /77   Pulse 73   Ht 5' 1" (1.549 m)   Wt 61.5 kg (135 lb 9.6 oz)   BMI 25.62 kg/m²     Constitutional:normal, well developed, well nourished, alert, in no distress and non-toxic and no overt pain behavior.   Eyes:anicteric  HEENT:grossly intact  Neck:supple, symmetric, trachea midline and no masses Pulmonary:even and unlabored  Cardiovascular:No edema or pitting edema present  Skin:Normal without rashes or lesions and well hydrated  Psychiatric:Mood and affect appropriate  Neurologic:Cranial Nerves II-XII grossly intact  Musculoskeletal:normal      This document was created using speech voice recognition software. Grammatical errors, random word insertions, pronoun errors, and incomplete sentences are an occasional consequence of this system due to software limitations, ambient noise, and hardware issues. Any formal questions or concerns about content, text, or information contained within the body of this dictation should be directly addressed to the provider for clarification.

## 2023-11-17 ENCOUNTER — OFFICE VISIT (OUTPATIENT)
Dept: PAIN MEDICINE | Facility: CLINIC | Age: 58
End: 2023-11-17
Payer: COMMERCIAL

## 2023-11-17 VITALS
HEIGHT: 61 IN | BODY MASS INDEX: 25.6 KG/M2 | WEIGHT: 135.6 LBS | HEART RATE: 73 BPM | DIASTOLIC BLOOD PRESSURE: 77 MMHG | SYSTOLIC BLOOD PRESSURE: 113 MMHG

## 2023-11-17 DIAGNOSIS — M54.16 LUMBAR RADICULOPATHY: ICD-10-CM

## 2023-11-17 DIAGNOSIS — G89.4 CHRONIC PAIN SYNDROME: Primary | ICD-10-CM

## 2023-11-17 DIAGNOSIS — M96.1 LUMBAR POST-LAMINECTOMY SYNDROME: ICD-10-CM

## 2023-11-17 PROCEDURE — 99213 OFFICE O/P EST LOW 20 MIN: CPT

## 2023-11-17 RX ORDER — GABAPENTIN 800 MG/1
800 TABLET ORAL 3 TIMES DAILY
Qty: 270 TABLET | Refills: 1 | Status: SHIPPED | OUTPATIENT
Start: 2023-11-17

## 2023-11-17 RX ORDER — MELOXICAM 7.5 MG/1
7.5 TABLET ORAL DAILY PRN
Qty: 90 TABLET | Refills: 1 | Status: SHIPPED | OUTPATIENT
Start: 2023-11-17

## 2023-11-17 NOTE — PATIENT INSTRUCTIONS
Gabapentin (By mouth)   Gabapentin (bert-a-PEN-tin)  Treats seizures and pain caused by shingles. Brand Name(s): FusePaq Fanatrex, Neurontin   There may be other brand names for this medicine. When This Medicine Should Not Be Used: This medicine is not right for everyone. Do not use it if you had an allergic reaction to gabapentin. How to Use This Medicine:   Capsule, Liquid, Tablet  Take your medicine as directed. Your dose may need to be changed several times to find what works best for you. If you have epilepsy, do not allow more than 12 hours to pass between doses. Capsule: Swallow the capsule whole with plenty of water. Do not open, crush, or chew it. Gralise® tablet: Swallow the tablet whole . Do not crush, break, or chew it. Neurontin® tablet: If you break a tablet into 2 pieces, use the second half as your next dose. Do not use the half-tablet if the whole tablet has been cut or broken after 28 days. Oral liquid: Measure the oral liquid medicine with a marked measuring spoon, oral syringe, or medicine cup. This medicine should come with a Medication Guide. Ask your pharmacist for a copy if you do not have one. Missed dose: Take a dose as soon as you remember. If it is almost time for your next dose, wait until then and take a regular dose. Do not take extra medicine to make up for a missed dose. Store the medicine in a closed container at room temperature, away from heat, moisture, and direct light. Store the Neurontin® oral liquid in the refrigerator. Do not freeze. Drugs and Foods to Avoid:   Ask your doctor or pharmacist before using any other medicine, including over-the-counter medicines, vitamins, and herbal products. Some medicines can affect how gabapentin works. Tell your doctor if you also using hydrocodone or morphine. If you take an antacid, wait at least 2 hours before you take gabapentin. Do not drink alcohol while you are using this medicine.   Tell your doctor if you use anything else that makes you sleepy. Some examples are allergy medicine, narcotic pain medicine, and alcohol. Tell your doctor if you are also using lorazepam, oxycodone, or zolpidem. Warnings While Using This Medicine:   Tell your doctor if you are pregnant or breastfeeding, or if you have kidney problems (including patients receiving dialysis) or lung problems. Tell your doctor if you have a history of depression or mental health problems. This medicine may cause the following problems:  Drug reaction with eosinophilia and systemic symptoms (DRESS) or multiorgan hypersensitivity, which may damage the liver, kidney, blood, heart, or muscles  Changes in mood or behavior, including suicidal thoughts or behavior  Respiratory depression (serious breathing problem that can be life-threatening), when used with narcotic pain medicines  Do not stop using this medicine suddenly. Your doctor will need to slowly decrease your dose before you stop it completely. This medicine may make you dizzy or drowsy. Do not drive or do anything else that could be dangerous until you know how this medicine affects you. Tell any doctor or dentist who treats you that you are using this medicine. This medicine may affect certain medical test results. Your doctor will check your progress and the effects of this medicine at regular visits. Keep all appointments. Keep all medicine out of the reach of children. Never share your medicine with anyone. Possible Side Effects While Using This Medicine:   Call your doctor right away if you notice any of these side effects:   Allergic reaction: Itching or hives, swelling in your face or hands, swelling or tingling in your mouth or throat, chest tightness, trouble breathing  Behavior problems, aggression, restlessness, trouble concentrating, moodiness (especially in children)  Blistering, peeling, red skin rash  Blue lips, fingernails, or skin, chest pain, fast heartbeat, trouble breathing  Change in how much or how often you urinate, bloody or cloudy urine  Dark urine or pale stools, nausea, vomiting, loss of appetite, stomach pain, yellow skin or eyes  Fever, chills, cough, sore throat, body aches  Problems with coordination, shakiness, unsteadiness, unusual eye movement  Rapid weight gain, swelling in your hands, ankles, or feet  Rash, swollen or tender glands in the neck, armpit, or groin  Unusual moods or behaviors, thoughts of hurting yourself, feeling depressed  If you notice these less serious side effects, talk with your doctor:   Dizziness, drowsiness, sleepiness, tiredness  If you notice other side effects that you think are caused by this medicine, tell your doctor. Call your doctor for medical advice about side effects. You may report side effects to FDA at 5-810-FDA-0181    © Copyright [employer] 2022 Information is for End User's use only and may not be sold, redistributed or otherwise used for commercial purposes. The above information is an  only. It is not intended as medical advice for individual conditions or treatments. Talk to your doctor, nurse or pharmacist before following any medical regimen to see if it is safe and effective for you.

## 2023-12-13 ENCOUNTER — APPOINTMENT (OUTPATIENT)
Dept: LAB | Facility: HOSPITAL | Age: 58
End: 2023-12-13
Payer: COMMERCIAL

## 2023-12-13 ENCOUNTER — OFFICE VISIT (OUTPATIENT)
Dept: GASTROENTEROLOGY | Facility: CLINIC | Age: 58
End: 2023-12-13
Payer: COMMERCIAL

## 2023-12-13 VITALS
SYSTOLIC BLOOD PRESSURE: 126 MMHG | WEIGHT: 135 LBS | DIASTOLIC BLOOD PRESSURE: 83 MMHG | BODY MASS INDEX: 25.49 KG/M2 | HEIGHT: 61 IN | OXYGEN SATURATION: 98 % | HEART RATE: 67 BPM

## 2023-12-13 DIAGNOSIS — Z12.11 SCREENING FOR COLON CANCER: ICD-10-CM

## 2023-12-13 DIAGNOSIS — K58.1 IRRITABLE BOWEL SYNDROME WITH CONSTIPATION: Primary | Chronic | ICD-10-CM

## 2023-12-13 LAB
BASOPHILS # BLD AUTO: 0.04 THOUSANDS/ÂΜL (ref 0–0.1)
BASOPHILS NFR BLD AUTO: 1 % (ref 0–1)
EOSINOPHIL # BLD AUTO: 0.06 THOUSAND/ÂΜL (ref 0–0.61)
EOSINOPHIL NFR BLD AUTO: 2 % (ref 0–6)
ERYTHROCYTE [DISTWIDTH] IN BLOOD BY AUTOMATED COUNT: 12.1 % (ref 11.6–15.1)
HCT VFR BLD AUTO: 35.4 % (ref 34.8–46.1)
HGB BLD-MCNC: 11.7 G/DL (ref 11.5–15.4)
IMM GRANULOCYTES # BLD AUTO: 0.01 THOUSAND/UL (ref 0–0.2)
IMM GRANULOCYTES NFR BLD AUTO: 0 % (ref 0–2)
LYMPHOCYTES # BLD AUTO: 1.52 THOUSANDS/ÂΜL (ref 0.6–4.47)
LYMPHOCYTES NFR BLD AUTO: 47 % (ref 14–44)
MCH RBC QN AUTO: 29.7 PG (ref 26.8–34.3)
MCHC RBC AUTO-ENTMCNC: 33.1 G/DL (ref 31.4–37.4)
MCV RBC AUTO: 90 FL (ref 82–98)
MONOCYTES # BLD AUTO: 0.24 THOUSAND/ÂΜL (ref 0.17–1.22)
MONOCYTES NFR BLD AUTO: 7 % (ref 4–12)
NEUTROPHILS # BLD AUTO: 1.41 THOUSANDS/ÂΜL (ref 1.85–7.62)
NEUTS SEG NFR BLD AUTO: 43 % (ref 43–75)
NRBC BLD AUTO-RTO: 0 /100 WBCS
PLATELET # BLD AUTO: 261 THOUSANDS/UL (ref 149–390)
PMV BLD AUTO: 9.9 FL (ref 8.9–12.7)
RBC # BLD AUTO: 3.94 MILLION/UL (ref 3.81–5.12)
WBC # BLD AUTO: 3.28 THOUSAND/UL (ref 4.31–10.16)

## 2023-12-13 PROCEDURE — 99213 OFFICE O/P EST LOW 20 MIN: CPT | Performed by: PHYSICIAN ASSISTANT

## 2023-12-13 RX ORDER — LINACLOTIDE 145 UG/1
145 CAPSULE, GELATIN COATED ORAL DAILY
Qty: 90 CAPSULE | Refills: 3 | Status: SHIPPED | OUTPATIENT
Start: 2023-12-13

## 2023-12-13 NOTE — PROGRESS NOTES
[facility] - Outpatient Follow-up Note  Rivera Haas 62 y.o. female MRN: [id number]  Encounter: [id number]          ASSESSMENT AND PLAN:      1. Irritable bowel syndrome with constipation  She remains on Linzess 145mcg daily with good bowel movements  Rarely has to use Dulcolax  Continue high fiber - 25-30gms per day diet  Continue increased water intake - minimum 64 oz per day  Increase activity levels    2. Screening for colon cancer  She is due routinely in 2024 for a colonoscopy  Will schedule  Last exam in 2014 was normal    ______________________________________________________________________    SUBJECTIVE: Here female with history of constipation predominant irritable bowel syndrome who presents for routine follow-up. Overall she is doing very well. She remains on Linzess 145 mcg daily. She reports that typically she has 1 good bowel movement a day. She denies diarrhea. She occasionally has to take a Dulcolax for constipation. She will do this if she does not have a bowel movement after 3 days. She denies any severe abdominal pain. She has no nausea or vomiting. She denies any rectal bleeding. Her last colonoscopy was in 2014 and this was a normal exam.  She has no family history of colorectal cancer. REVIEW OF SYSTEMS IS OTHERWISE NEGATIVE.       Historical Information   Past Medical History:   Diagnosis Date    Abdominal pain     Arthralgia of pelvic region and thigh     Chest pain     CTS (carpal tunnel syndrome)     Fibromyalgia     Hepatitis     Leukocytopenia     Leukorrhea     Temporomandibular joint sounds on opening and/or closing the jaw      Past Surgical History:   Procedure Laterality Date    BACK SURGERY      CHOLECYSTECTOMY      FOOT SURGERY Right     INCONTINENCE SURGERY      vaginal sling for stress incontinence    IR BIOPSY BONE MARROW  11/29/2022    [facility]ION  [facility]/06/2019    [facility]    SINUS SURGERY      SPINAL FUSION  3/5/2019    SPINE SURGERY 03/05/2019     Social History   Social History     Substance and Sexual Activity   Alcohol Use No     Social History     Substance and Sexual Activity   Drug Use No     Social History     Tobacco Use   Smoking Status Never   Smokeless Tobacco Never     Family History   Problem Relation Age of Onset    Alzheimer's disease Father     Asthma Father     Heart attack Father     COPD Father     Hypertension Father     Alzheimer's disease Maternal Grandmother     Lymphoma Maternal Aunt     Emphysema Maternal Grandfather        Meds/Allergies       Current Outpatient Medications:     acetaminophen (TYLENOL) 325 mg tablet    Ascorbic Acid (Vitamin C ER) 1000 MG TBCR    Cholecalciferol 50 MCG (2000 UT) TABS    gabapentin (NEURONTIN) 800 mg tablet    hydroxychloroquine (PLAQUENIL) 200 mg tablet    linaCLOtide (Linzess) 145 MCG CAPS    meloxicam (MOBIC) 7.5 mg tablet    minoxidil (LONITEN) 2.5 mg tablet    mometasone (NASONEX) 50 mcg/act nasal spray    multivitamin (THERAGRAN) TABS    Allergies   Allergen Reactions    Other Sneezing and Nasal Congestion     SEASONAL ALLERGIES           Objective     Blood pressure 126/83, pulse 67, height 5' 1" (1.549 m), weight 61.2 kg (135 lb), SpO2 98%, not currently breastfeeding. Body mass index is 25.51 kg/m². PHYSICAL EXAM:      General Appearance:   Alert, cooperative, no distress   HEENT:   Normocephalic, atraumatic, anicteric. Neck:  Supple, symmetrical, trachea midline   Lungs:   Clear to auscultation bilaterally; no rales, rhonchi or wheezing; respirations unlabored    Heart[de-identified]   Regular rate and rhythm; no murmur, rub, or gallop.    Abdomen:   Soft, non-tender, non-distended; normal bowel sounds; no masses, no organomegaly    Genitalia:   Deferred    Rectal:   Deferred    Extremities:  No cyanosis, clubbing or edema    Pulses:  2+ and symmetric    Skin:  No jaundice, rashes, or lesions    Lymph nodes:  No palpable cervical lymphadenopathy        Lab Results:   No visits with results within 1 Day(s) from this visit. Latest known visit with results is:   Telemedicine on 09/25/2023   Component Date Value    WBC 12/13/2023 3.28 (L)     RBC 12/13/2023 3.94     Hemoglobin 12/13/2023 11.7     Hematocrit 12/13/2023 35.4     MCV 12/13/2023 90     MCH 12/13/2023 29.7     MCHC 12/13/2023 33.1     RDW 12/13/2023 12.1     MPV 12/13/2023 9.9     Platelets 57/36/9639 261     nRBC 12/13/2023 0     Neutrophils Relative 12/13/2023 43     Immat GRANS % 12/13/2023 0     Lymphocytes Relative 12/13/2023 47 (H)     Monocytes Relative 12/13/2023 7     Eosinophils Relative 12/13/2023 2     Basophils Relative 12/13/2023 1     Neutrophils Absolute 12/13/2023 1.41 (L)     Immature Grans Absolute 12/13/2023 0.01     Lymphocytes Absolute 12/13/2023 1.52     Monocytes Absolute 12/13/2023 0.24     Eosinophils Absolute 12/13/2023 0.06     Basophils Absolute 12/13/2023 0.04          Radiology Results:   No results found.   Answers submitted by the patient for this visit:  Abdominal Pain Questionnaire (Submitted on 12/11/2023)  Chief Complaint: Abdominal pain  Chronicity: recurrent  Onset: more than 1 year ago  Onset quality: gradual  Frequency: intermittently  Episode duration: 3 Hours  Progression since onset: waxing and waning  Pain location: RUQ, right flank  Pain - numeric: 6/10  Pain quality: sharp  Radiates to: RLQ, RUQ, right flank  anorexia: No  belching: No  constipation: Yes  diarrhea: Yes  Aggravated by: certain positions  Relieved by: palpation, sitting up  Diagnostic workup: ultrasound

## 2023-12-13 NOTE — PATIENT INSTRUCTIONS
Scheduled date of colonoscopy (as of today):3/8/24  Physician performing colonoscopy: Desmond  Location of colonoscopy:[facility]  Bowel prep reviewed with patient:miralax/dulcolax  Instructions reviewed with patient by:Valentine BOONE  Clearances:  none

## 2024-01-18 ENCOUNTER — APPOINTMENT (OUTPATIENT)
Dept: LAB | Facility: HOSPITAL | Age: 59
End: 2024-01-18
Payer: COMMERCIAL

## 2024-01-18 DIAGNOSIS — M19.90 INFLAMMATORY OSTEOARTHRITIS: ICD-10-CM

## 2024-01-18 DIAGNOSIS — Z79.899 ENCOUNTER FOR LONG-TERM (CURRENT) USE OF OTHER MEDICATIONS: ICD-10-CM

## 2024-01-18 LAB
ALBUMIN SERPL BCP-MCNC: 4.5 G/DL (ref 3.5–5)
ALP SERPL-CCNC: 74 U/L (ref 34–104)
ALT SERPL W P-5'-P-CCNC: 34 U/L (ref 7–52)
ANION GAP SERPL CALCULATED.3IONS-SCNC: 6 MMOL/L
AST SERPL W P-5'-P-CCNC: 28 U/L (ref 13–39)
BASOPHILS # BLD AUTO: 0.03 THOUSANDS/ÂΜL (ref 0–0.1)
BASOPHILS NFR BLD AUTO: 1 % (ref 0–1)
BILIRUB SERPL-MCNC: 0.72 MG/DL (ref 0.2–1)
BUN SERPL-MCNC: 12 MG/DL (ref 5–25)
CALCIUM SERPL-MCNC: 9.9 MG/DL (ref 8.4–10.2)
CHLORIDE SERPL-SCNC: 106 MMOL/L (ref 96–108)
CO2 SERPL-SCNC: 28 MMOL/L (ref 21–32)
CREAT SERPL-MCNC: 0.87 MG/DL (ref 0.6–1.3)
CRP SERPL QL: <1 MG/L
EOSINOPHIL # BLD AUTO: 0.05 THOUSAND/ÂΜL (ref 0–0.61)
EOSINOPHIL NFR BLD AUTO: 2 % (ref 0–6)
ERYTHROCYTE [DISTWIDTH] IN BLOOD BY AUTOMATED COUNT: 12.1 % (ref 11.6–15.1)
ERYTHROCYTE [SEDIMENTATION RATE] IN BLOOD: 20 MM/HOUR (ref 0–29)
GFR SERPL CREATININE-BSD FRML MDRD: 73 ML/MIN/1.73SQ M
GLUCOSE P FAST SERPL-MCNC: 91 MG/DL (ref 65–99)
HCT VFR BLD AUTO: 38.2 % (ref 34.8–46.1)
HGB BLD-MCNC: 12.8 G/DL (ref 11.5–15.4)
IMM GRANULOCYTES # BLD AUTO: 0 THOUSAND/UL (ref 0–0.2)
IMM GRANULOCYTES NFR BLD AUTO: 0 % (ref 0–2)
LYMPHOCYTES # BLD AUTO: 1.42 THOUSANDS/ÂΜL (ref 0.6–4.47)
LYMPHOCYTES NFR BLD AUTO: 46 % (ref 14–44)
MCH RBC QN AUTO: 29.8 PG (ref 26.8–34.3)
MCHC RBC AUTO-ENTMCNC: 33.5 G/DL (ref 31.4–37.4)
MCV RBC AUTO: 89 FL (ref 82–98)
MONOCYTES # BLD AUTO: 0.26 THOUSAND/ÂΜL (ref 0.17–1.22)
MONOCYTES NFR BLD AUTO: 8 % (ref 4–12)
NEUTROPHILS # BLD AUTO: 1.34 THOUSANDS/ÂΜL (ref 1.85–7.62)
NEUTS SEG NFR BLD AUTO: 43 % (ref 43–75)
NRBC BLD AUTO-RTO: 0 /100 WBCS
PLATELET # BLD AUTO: 268 THOUSANDS/UL (ref 149–390)
PMV BLD AUTO: 9.8 FL (ref 8.9–12.7)
POTASSIUM SERPL-SCNC: 4.4 MMOL/L (ref 3.5–5.3)
PROT SERPL-MCNC: 8 G/DL (ref 6.4–8.4)
RBC # BLD AUTO: 4.3 MILLION/UL (ref 3.81–5.12)
SODIUM SERPL-SCNC: 140 MMOL/L (ref 135–147)
WBC # BLD AUTO: 3.1 THOUSAND/UL (ref 4.31–10.16)

## 2024-01-18 PROCEDURE — 80053 COMPREHEN METABOLIC PANEL: CPT

## 2024-01-18 PROCEDURE — 86140 C-REACTIVE PROTEIN: CPT

## 2024-01-18 PROCEDURE — 85652 RBC SED RATE AUTOMATED: CPT

## 2024-01-18 PROCEDURE — 85025 COMPLETE CBC W/AUTO DIFF WBC: CPT

## 2024-01-18 PROCEDURE — 36415 COLL VENOUS BLD VENIPUNCTURE: CPT

## 2024-01-22 ENCOUNTER — TELEPHONE (OUTPATIENT)
Age: 59
End: 2024-01-22

## 2024-01-24 ENCOUNTER — HOSPITAL ENCOUNTER (EMERGENCY)
Facility: HOSPITAL | Age: 59
Discharge: HOME | End: 2024-01-24
Attending: EMERGENCY MEDICINE
Payer: COMMERCIAL

## 2024-01-24 ENCOUNTER — APPOINTMENT (EMERGENCY)
Dept: CT IMAGING | Facility: HOSPITAL | Age: 59
End: 2024-01-24
Payer: COMMERCIAL

## 2024-01-24 VITALS
HEART RATE: 68 BPM | RESPIRATION RATE: 18 BRPM | DIASTOLIC BLOOD PRESSURE: 76 MMHG | TEMPERATURE: 97.8 F | OXYGEN SATURATION: 100 % | SYSTOLIC BLOOD PRESSURE: 143 MMHG

## 2024-01-24 DIAGNOSIS — R19.7 DIARRHEA: ICD-10-CM

## 2024-01-24 DIAGNOSIS — R10.9 ABDOMINAL PAIN: Primary | ICD-10-CM

## 2024-01-24 LAB
ALBUMIN SERPL BCP-MCNC: 4.6 G/DL (ref 3.5–5)
ALP SERPL-CCNC: 77 U/L (ref 34–104)
ALT SERPL W P-5'-P-CCNC: 33 U/L (ref 7–52)
ANION GAP SERPL CALCULATED.3IONS-SCNC: 7 MMOL/L
APTT PPP: 27 SECONDS (ref 23–37)
AST SERPL W P-5'-P-CCNC: 25 U/L (ref 13–39)
BASOPHILS # BLD AUTO: 0.04 THOUSANDS/ÂΜL (ref 0–0.1)
BASOPHILS NFR BLD AUTO: 1 % (ref 0–1)
BILIRUB SERPL-MCNC: 0.42 MG/DL (ref 0.2–1)
BILIRUB UR QL STRIP: NEGATIVE
BUN SERPL-MCNC: 12 MG/DL (ref 5–25)
CALCIUM SERPL-MCNC: 9.7 MG/DL (ref 8.4–10.2)
CHLORIDE SERPL-SCNC: 106 MMOL/L (ref 96–108)
CLARITY UR: CLEAR
CO2 SERPL-SCNC: 26 MMOL/L (ref 21–32)
COLOR UR: COLORLESS
CREAT SERPL-MCNC: 0.66 MG/DL (ref 0.6–1.3)
EOSINOPHIL # BLD AUTO: 0.07 THOUSAND/ÂΜL (ref 0–0.61)
EOSINOPHIL NFR BLD AUTO: 2 % (ref 0–6)
ERYTHROCYTE [DISTWIDTH] IN BLOOD BY AUTOMATED COUNT: 12.3 % (ref 11.6–15.1)
FLUAV RNA RESP QL NAA+PROBE: NEGATIVE
FLUBV RNA RESP QL NAA+PROBE: NEGATIVE
GFR SERPL CREATININE-BSD FRML MDRD: 97 ML/MIN/1.73SQ M
GLUCOSE SERPL-MCNC: 84 MG/DL (ref 65–140)
GLUCOSE UR STRIP-MCNC: NEGATIVE MG/DL
HCT VFR BLD AUTO: 37.6 % (ref 34.8–46.1)
HGB BLD-MCNC: 12.8 G/DL (ref 11.5–15.4)
HGB UR QL STRIP.AUTO: NEGATIVE
IMM GRANULOCYTES # BLD AUTO: 0.01 THOUSAND/UL (ref 0–0.2)
IMM GRANULOCYTES NFR BLD AUTO: 0 % (ref 0–2)
INR PPP: 0.92 (ref 0.84–1.19)
KETONES UR STRIP-MCNC: NEGATIVE MG/DL
LEUKOCYTE ESTERASE UR QL STRIP: NEGATIVE
LIPASE SERPL-CCNC: 35 U/L (ref 11–82)
LYMPHOCYTES # BLD AUTO: 1.91 THOUSANDS/ÂΜL (ref 0.6–4.47)
LYMPHOCYTES NFR BLD AUTO: 42 % (ref 14–44)
MCH RBC QN AUTO: 29.6 PG (ref 26.8–34.3)
MCHC RBC AUTO-ENTMCNC: 34 G/DL (ref 31.4–37.4)
MCV RBC AUTO: 87 FL (ref 82–98)
MONOCYTES # BLD AUTO: 0.37 THOUSAND/ÂΜL (ref 0.17–1.22)
MONOCYTES NFR BLD AUTO: 8 % (ref 4–12)
NEUTROPHILS # BLD AUTO: 2.2 THOUSANDS/ÂΜL (ref 1.85–7.62)
NEUTS SEG NFR BLD AUTO: 47 % (ref 43–75)
NITRITE UR QL STRIP: NEGATIVE
NRBC BLD AUTO-RTO: 0 /100 WBCS
PH UR STRIP.AUTO: 5 [PH]
PLATELET # BLD AUTO: 243 THOUSANDS/UL (ref 149–390)
PMV BLD AUTO: 10.9 FL (ref 8.9–12.7)
POTASSIUM SERPL-SCNC: 3.8 MMOL/L (ref 3.5–5.3)
PROT SERPL-MCNC: 7.9 G/DL (ref 6.4–8.4)
PROT UR STRIP-MCNC: NEGATIVE MG/DL
PROTHROMBIN TIME: 12.9 SECONDS (ref 11.6–14.5)
RBC # BLD AUTO: 4.33 MILLION/UL (ref 3.81–5.12)
RSV RNA RESP QL NAA+PROBE: NEGATIVE
SARS-COV-2 RNA RESP QL NAA+PROBE: NEGATIVE
SODIUM SERPL-SCNC: 139 MMOL/L (ref 135–147)
SP GR UR STRIP.AUTO: 1 (ref 1–1.03)
UROBILINOGEN UR STRIP-ACNC: <2 MG/DL
WBC # BLD AUTO: 4.6 THOUSAND/UL (ref 4.31–10.16)

## 2024-01-24 PROCEDURE — 85025 COMPLETE CBC W/AUTO DIFF WBC: CPT | Performed by: EMERGENCY MEDICINE

## 2024-01-24 PROCEDURE — 96366 THER/PROPH/DIAG IV INF ADDON: CPT

## 2024-01-24 PROCEDURE — 96375 TX/PRO/DX INJ NEW DRUG ADDON: CPT

## 2024-01-24 PROCEDURE — 83690 ASSAY OF LIPASE: CPT | Performed by: EMERGENCY MEDICINE

## 2024-01-24 PROCEDURE — 85610 PROTHROMBIN TIME: CPT | Performed by: EMERGENCY MEDICINE

## 2024-01-24 PROCEDURE — 0241U HB NFCT DS VIR RESP RNA 4 TRGT: CPT | Performed by: EMERGENCY MEDICINE

## 2024-01-24 PROCEDURE — G1004 CDSM NDSC: HCPCS

## 2024-01-24 PROCEDURE — 99284 EMERGENCY DEPT VISIT MOD MDM: CPT

## 2024-01-24 PROCEDURE — 85730 THROMBOPLASTIN TIME PARTIAL: CPT | Performed by: EMERGENCY MEDICINE

## 2024-01-24 PROCEDURE — 74177 CT ABD & PELVIS W/CONTRAST: CPT

## 2024-01-24 PROCEDURE — 81003 URINALYSIS AUTO W/O SCOPE: CPT | Performed by: EMERGENCY MEDICINE

## 2024-01-24 PROCEDURE — 96365 THER/PROPH/DIAG IV INF INIT: CPT

## 2024-01-24 PROCEDURE — 99284 EMERGENCY DEPT VISIT MOD MDM: CPT | Performed by: EMERGENCY MEDICINE

## 2024-01-24 PROCEDURE — 36415 COLL VENOUS BLD VENIPUNCTURE: CPT | Performed by: EMERGENCY MEDICINE

## 2024-01-24 PROCEDURE — 80053 COMPREHEN METABOLIC PANEL: CPT | Performed by: EMERGENCY MEDICINE

## 2024-01-24 RX ORDER — ACETAMINOPHEN 325 MG/1
650 TABLET ORAL EVERY 6 HOURS PRN
Qty: 40 TABLET | Refills: 0 | Status: SHIPPED | OUTPATIENT
Start: 2024-01-24

## 2024-01-24 RX ORDER — ACETAMINOPHEN 10 MG/ML
1000 INJECTION, SOLUTION INTRAVENOUS ONCE
Status: COMPLETED | OUTPATIENT
Start: 2024-01-24 | End: 2024-01-24

## 2024-01-24 RX ORDER — ONDANSETRON 2 MG/ML
4 INJECTION INTRAMUSCULAR; INTRAVENOUS ONCE
Status: COMPLETED | OUTPATIENT
Start: 2024-01-24 | End: 2024-01-24

## 2024-01-24 RX ADMIN — ONDANSETRON 4 MG: 2 INJECTION INTRAMUSCULAR; INTRAVENOUS at 17:31

## 2024-01-24 RX ADMIN — IOHEXOL 100 ML: 350 INJECTION, SOLUTION INTRAVENOUS at 18:22

## 2024-01-24 RX ADMIN — ACETAMINOPHEN 1000 MG: 10 INJECTION INTRAVENOUS at 17:32

## 2024-01-24 NOTE — ED PROVIDER NOTES
Pt Name: Fidelia Morales  MRN: [id number]  Birthdate 1965  Age/Sex: 58 y.o. female  Date of evaluation: 1/24/2024  PCP: Toñito Escalera DO    CHIEF COMPLAINT    Chief Complaint   Patient presents with    Abdominal Pain     RLQ abd pain starting today with diarrhea.          HPI    58 y.o. female presenting with right lower quadrant abdominal pain.  Patient states she was in her safe health yesterday, woke this morning with right lower quadrant abdominal pain and diarrhea.  The pain is dull, moderate severe, in the right lower quadrant, radiating throughout the abdomen, worse with walking or movement better at rest.  She notes multiple loose stools over the course the day, nonbloody.  She complains of nausea but denies vomiting.  She denies trauma, chest pain, shortness of breath, fevers, other symptoms.      HPI      Past Medical and Surgical History    Past Medical History:   Diagnosis Date    Abdominal pain     Arthralgia of pelvic region and thigh     Chest pain     CTS (carpal tunnel syndrome)     Fibromyalgia     Hepatitis     Leukocytopenia     Leukorrhea     Temporomandibular joint sounds on opening and/or closing the jaw        Past Surgical History:   Procedure Laterality Date    BACK SURGERY      CHOLECYSTECTOMY      FOOT SURGERY Right     INCONTINENCE SURGERY      vaginal sling for stress incontinence    IR BIOPSY BONE MARROW  11/29/2022    LUMBAR FUSION  03/06/2019    [facility]    SINUS SURGERY      SPINAL FUSION  3/5/2019    SPINE SURGERY  03/05/2019       Family History   Problem Relation Age of Onset    Alzheimer's disease Father     Asthma Father     Heart attack Father     COPD Father     Hypertension Father     Alzheimer's disease Maternal Grandmother     Lymphoma Maternal Aunt     Emphysema Maternal Grandfather        Social History     Tobacco Use    Smoking status: Never    Smokeless tobacco: Never   Vaping Use    Vaping status: Never Used   Substance Use Topics    Alcohol use: No    Drug use:  No           Allergies    Allergies   Allergen Reactions    Other Sneezing and Nasal Congestion     SEASONAL ALLERGIES       Home Medications    Prior to Admission medications    Medication Sig Start Date End Date Taking? Authorizing Provider   acetaminophen (TYLENOL) 325 mg tablet Take 500 mg by mouth as needed for mild pain    Historical Provider, MD   Ascorbic Acid (Vitamin C ER) 1000 MG TBCR Take 1,000 mg by mouth    Historical Provider, MD   Cholecalciferol 50 MCG (2000 UT) TABS Take 2,000 Units by mouth    Historical Provider, MD   gabapentin (NEURONTIN) 800 mg tablet Take 1 tablet (800 mg total) by mouth 3 (three) times a day 11/17/23   LUIS Crawford   hydroxychloroquine (PLAQUENIL) 200 mg tablet Take 1 tablet (200 mg total) by mouth 2 (two) times a day with meals 9/21/23 3/19/24  Milagros Gonzales PA-C   linaCLOtide (Linzess) 145 MCG CAPS Take 1 capsule (145 mcg total) by mouth daily 12/13/23   Sariah Romero PA-C   meloxicam (MOBIC) 7.5 mg tablet Take 1 tablet (7.5 mg total) by mouth daily as needed for moderate pain 11/17/23   LUIS Crawford   minoxidil (LONITEN) 2.5 mg tablet TAKE ONE-HALF (1/2) TABLET DAILY 5/4/23   Toñito Escalera DO   mometasone (NASONEX) 50 mcg/act nasal spray USE 2 SPRAYS IN EACH NOSTRIL DAILY 2/24/23   Toñito Escalera DO   multivitamin (THERAGRAN) TABS Take 1 tablet by mouth daily    Historical Provider, MD           Review of Systems    Review of Systems   Constitutional:  Negative for activity change, chills and fever.   HENT:  Negative for drooling and facial swelling.    Eyes:  Negative for pain, discharge and visual disturbance.   Respiratory:  Negative for apnea, cough, chest tightness, shortness of breath and wheezing.    Cardiovascular:  Negative for chest pain and leg swelling.   Gastrointestinal:  Positive for abdominal pain, diarrhea and nausea. Negative for constipation and vomiting.   Genitourinary:  Negative for difficulty urinating, dysuria and  urgency.   Musculoskeletal:  Negative for arthralgias, back pain and gait problem.   Skin:  Negative for color change and rash.   Neurological:  Negative for dizziness, speech difficulty, weakness and headaches.   Psychiatric/Behavioral:  Negative for agitation, behavioral problems and confusion.            All other systems reviewed and negative.    Physical Exam      ED Triage Vitals   Temperature Pulse Respirations Blood Pressure SpO2   01/24/24 1603 01/24/24 1603 01/24/24 1603 01/24/24 1603 01/24/24 1603   97.8 °F (36.6 °C) 82 18 137/81 99 %      Temp Source Heart Rate Source Patient Position - Orthostatic VS BP Location FiO2 (%)   01/24/24 1603 01/24/24 1603 01/24/24 1603 01/24/24 1603 --   Temporal Monitor Sitting Left arm       Pain Score       01/24/24 1605       6               Physical Exam  Vitals and nursing note reviewed.   Constitutional:       General: She is not in acute distress.     Appearance: She is well-developed. She is not ill-appearing, toxic-appearing or diaphoretic.   HENT:      Head: Normocephalic and atraumatic.      Right Ear: External ear normal.      Left Ear: External ear normal.      Nose: Nose normal. No congestion or rhinorrhea.      Mouth/Throat:      Mouth: Mucous membranes are moist.      Pharynx: Oropharynx is clear. No oropharyngeal exudate or posterior oropharyngeal erythema.   Eyes:      Conjunctiva/sclera: Conjunctivae normal.      Pupils: Pupils are equal, round, and reactive to light.   Cardiovascular:      Rate and Rhythm: Normal rate and regular rhythm.      Pulses: Normal pulses.      Heart sounds: Normal heart sounds. No murmur heard.     No friction rub. No gallop.   Pulmonary:      Effort: Pulmonary effort is normal. No respiratory distress.      Breath sounds: Normal breath sounds. No wheezing or rales.   Abdominal:      General: There is no distension.      Palpations: Abdomen is soft.      Tenderness: There is abdominal tenderness. There is no guarding or  rebound.      Comments: Tender to palpation in the right lower quadrant, no rebound or guarding   Musculoskeletal:         General: No deformity. Normal range of motion.      Cervical back: Normal range of motion and neck supple. No rigidity.      Right lower leg: No edema.      Left lower leg: No edema.   Skin:     General: Skin is warm and dry.      Capillary Refill: Capillary refill takes less than 2 seconds.      Findings: No erythema or rash.   Neurological:      Mental Status: She is alert and oriented to person, place, and time.   Psychiatric:         Behavior: Behavior normal.         Thought Content: Thought content normal.         Judgment: Judgment normal.              Diagnostic Results      Labs:    Results Reviewed       Procedure Component Value Units Date/Time    FLU/RSV/COVID - if FLU/RSV clinically relevant [[id number]]  (Normal) Collected: 01/24/24 1731    Lab Status: Final result Specimen: Nares from Nose Updated: 01/24/24 1826     SARS-CoV-2 Negative     INFLUENZA A PCR Negative     INFLUENZA B PCR Negative     RSV PCR Negative    Narrative:      FOR PEDIATRIC PATIENTS - copy/paste COVID Guidelines URL to browser: [email or web address]    SARS-CoV-2 assay is a Nucleic Acid Amplification assay intended for the  qualitative detection of nucleic acid from SARS-CoV-2 in nasopharyngeal  swabs. Results are for the presumptive identification of SARS-CoV-2 RNA.    Positive results are indicative of infection with SARS-CoV-2, the virus  causing COVID-19, but do not rule out bacterial infection or co-infection  with other viruses. Laboratories within the [place] and its  territories are required to report all positive results to the appropriate  public health authorities. Negative results do not preclude SARS-CoV-2  infection and should not be used as the sole basis for treatment or other  patient management decisions. Negative results must be  combined with  clinical observations, patient history, and epidemiological information.  This test has not been FDA cleared or approved.    This test has been authorized by FDA under an Emergency Use Authorization  (EUA). This test is only authorized for the duration of time the  declaration that circumstances exist justifying the authorization of the  emergency use of an in vitro diagnostic tests for detection of SARS-CoV-2  virus and/or diagnosis of COVID-19 infection under section 564(b)(1) of  the Act, 21 U.S.C. 360bbb-3(b)(1), unless the authorization is terminated  or revoked sooner. The test has been validated but independent review by FDA  and CLIA is pending.    Test performed using [employer] GeneXpert: This RT-PCR assay targets N2,  a region unique to SARS-CoV-2. A conserved region in the E-gene was chosen  for pan-Sarbecovirus detection which includes SARS-CoV-2.    According to CMS-2020-01-R, this platform meets the definition of high-throughput technology.    Comprehensive metabolic panel [[id number]] Collected: 01/24/24 1731    Lab Status: Final result Specimen: Blood from Arm, Right Updated: 01/24/24 1806     Sodium 139 mmol/L      Potassium 3.8 mmol/L      Chloride 106 mmol/L      CO2 26 mmol/L      ANION GAP 7 mmol/L      BUN 12 mg/dL      Creatinine 0.66 mg/dL      Glucose 84 mg/dL      Calcium 9.7 mg/dL      AST 25 U/L      ALT 33 U/L      Alkaline Phosphatase 77 U/L      Total Protein 7.9 g/dL      Albumin 4.6 g/dL      Total Bilirubin 0.42 mg/dL      eGFR 97 ml/min/1.73sq m     Narrative:      National Kidney Disease Foundation guidelines for Chronic Kidney Disease (CKD):     Stage 1 with normal or high GFR (GFR > 90 mL/min/1.73 square meters)    Stage 2 Mild CKD (GFR = 60-89 mL/min/1.73 square meters)    Stage 3A Moderate CKD (GFR = 45-59 mL/min/1.73 square meters)    Stage 3B Moderate CKD (GFR = 30-44 mL/min/1.73 square meters)    Stage 4 Severe CKD (GFR = 15-29 mL/min/1.73 square meters)     Stage 5 End Stage CKD (GFR <15 mL/min/1.73 square meters)  Note: GFR calculation is accurate only with a steady state creatinine    Lipase [[id number]]  (Normal) Collected: 01/24/24 1731    Lab Status: Final result Specimen: Blood from Arm, Right Updated: 01/24/24 1806     Lipase 35 u/L     Protime-INR [[id number]]  (Normal) Collected: 01/24/24 1731    Lab Status: Final result Specimen: Blood from Arm, Right Updated: 01/24/24 1801     Protime 12.9 seconds      INR 0.92    APTT [[id number]]  (Normal) Collected: 01/24/24 1731    Lab Status: Final result Specimen: Blood from Arm, Right Updated: 01/24/24 1801     PTT 27 seconds     UA (URINE) with reflex to Scope [[id number]] Collected: 01/24/24 1731    Lab Status: Final result Specimen: Urine, Clean Catch Updated: 01/24/24 1749     Color, UA Colorless     Clarity, UA Clear     Specific Gravity, UA 1.005     pH, UA 5.0     Leukocytes, UA Negative     Nitrite, UA Negative     Protein, UA Negative mg/dl      Glucose, UA Negative mg/dl      Ketones, UA Negative mg/dl      Urobilinogen, UA <2.0 mg/dl      Bilirubin, UA Negative     Occult Blood, UA Negative    CBC and differential [[id number]] Collected: 01/24/24 1731    Lab Status: Final result Specimen: Blood from Arm, Right Updated: 01/24/24 1746     WBC 4.60 Thousand/uL      RBC 4.33 Million/uL      Hemoglobin 12.8 g/dL      Hematocrit 37.6 %      MCV 87 fL      MCH 29.6 pg      MCHC 34.0 g/dL      RDW 12.3 %      MPV 10.9 fL      Platelets 243 Thousands/uL      nRBC 0 /100 WBCs      Neutrophils Relative 47 %      Immat GRANS % 0 %      Lymphocytes Relative 42 %      Monocytes Relative 8 %      Eosinophils Relative 2 %      Basophils Relative 1 %      Neutrophils Absolute 2.20 Thousands/µL      Immature Grans Absolute 0.01 Thousand/uL      Lymphocytes Absolute 1.91 Thousands/µL      Monocytes Absolute 0.37 Thousand/µL      Eosinophils Absolute 0.07 Thousand/µL      Basophils Absolute 0.04 Thousands/µL             All labs  reviewed and utilized in the medical decision making process    Radiology:    CT abdomen pelvis with contrast   Final Result      No acute inflammatory process identified in the abdomen or pelvis.      Myomatous uterus with multiple fibroids the largest in the fundal region measuring 3.1 cm. Simple left ovarian cyst measuring 2.1 cm.         Workstation performed: [id number]             All radiology studies independently viewed by me and interpreted by the radiologist.    Procedure    Procedures        ED Course of Care and Re-Assessments      Pain much improved with Ofirmev, given Zofran for nausea.    Medications   acetaminophen (Ofirmev) injection 1,000 mg (0 mg Intravenous Stopped 1/24/24 1951)   ondansetron (ZOFRAN) injection 4 mg (4 mg Intravenous Given 1/24/24 1731)   iohexol (OMNIPAQUE) 350 MG/ML injection (MULTI-DOSE) 100 mL (100 mL Intravenous Given 1/24/24 1822)           FINAL IMPRESSION    Final diagnoses:   Abdominal pain   Diarrhea         DISPOSITION/PLAN    Presentation as above with abdominal pain and loose stools.  Vital signs reassuring, examination likewise reassuring with overall benign abdominal exam.  Labs sent returned reassuring without significant derangements, CT likewise reassuring with fibroids demonstrated but unlikely because of patient's current symptoms.  Do not suspect appendicitis, cholecystitis, small bowel obstruction, intra-abdominal abscess, bowel perforation, other acute life threat.  Discharged with strict return precautions, follow-up primary care doctor.  Time reflects when diagnosis was documented in both MDM as applicable and the Disposition within this note       Time User Action Codes Description Comment    1/24/2024  7:33 PM Hoang Ziegler Add [R10.9] Abdominal pain     1/24/2024  7:33 PM Hoang Ziegler Add [R19.7] Diarrhea           ED Disposition       ED Disposition   Discharge    Condition   Stable    Date/Time   Wed Jan 24, 2024  7:33 PM    Comment    Fidelia Morales discharge to home/self care.                   Follow-up Information       Follow up With Specialties Details Why Contact Info Additional Information    [facility] Emergency Medicine Go to  If symptoms worsen [address] [id number] [phone] [facility], [address], [place], [address]    Toñito Escalera DO Internal Medicine Call in 1 day To discuss this visit and schedule close follow-up [address]  [address] [facility] [id number] [phone]                 PATIENT REFERRED TO:    [facility]  [address] [address] [phone]  Go to   If symptoms worsen    Toñito Escalera DO  [address]  [address] [facility] [id number] [phone]    Call in 1 day  To discuss this visit and schedule close follow-up      DISCHARGE MEDICATIONS:    Discharge Medication List as of 1/24/2024  7:44 PM        START taking these medications    Details   !! acetaminophen (TYLENOL) 325 mg tablet Take 2 tablets (650 mg total) by mouth every 6 (six) hours as needed for mild pain, Starting Wed 1/24/2024, Print       !! - Potential duplicate medications found. Please discuss with provider.        CONTINUE these medications which have NOT CHANGED    Details   !! acetaminophen (TYLENOL) 325 mg tablet Take 500 mg by mouth as needed for mild pain, Historical Med      Ascorbic Acid (Vitamin C ER) 1000 MG TBCR Take 1,000 mg by mouth, Historical Med      Cholecalciferol 50 MCG (2000 UT) TABS Take 2,000 Units by mouth, Historical Med      gabapentin (NEURONTIN) 800 mg tablet Take 1 tablet (800 mg total) by mouth 3 (three) times a day, Starting Fri 11/17/2023, Normal      hydroxychloroquine (PLAQUENIL) 200 mg tablet Take 1 tablet (200 mg total) by mouth 2 (two) times a day with meals, Starting Thu  "9/21/2023, Until Tue 3/19/2024, Normal      linaCLOtide (Linzess) 145 MCG CAPS Take 1 capsule (145 mcg total) by mouth daily, Starting Wed 12/13/2023, Normal      meloxicam (MOBIC) 7.5 mg tablet Take 1 tablet (7.5 mg total) by mouth daily as needed for moderate pain, Starting Fri 11/17/2023, Normal      minoxidil (LONITEN) 2.5 mg tablet TAKE ONE-HALF (1/2) TABLET DAILY, Normal      mometasone (NASONEX) 50 mcg/act nasal spray USE 2 SPRAYS IN EACH NOSTRIL DAILY, Normal      multivitamin (THERAGRAN) TABS Take 1 tablet by mouth daily, Historical Med       !! - Potential duplicate medications found. Please discuss with provider.          No discharge procedures on file.         Hoang Ziegler MD    Portions of the record may have been created with voice recognition software.  Occasional wrong word or \"sound alike\" substitutions may have occurred due to the inherent limitations of voice recognition software.  Please read the chart carefully and recognize, using context, where substitutions have occurred     Hoang Ziegler MD  01/24/24 8136    "

## 2024-01-31 ENCOUNTER — OFFICE VISIT (OUTPATIENT)
Age: 59
End: 2024-01-31
Payer: COMMERCIAL

## 2024-01-31 VITALS
SYSTOLIC BLOOD PRESSURE: 120 MMHG | WEIGHT: 133 LBS | HEART RATE: 75 BPM | BODY MASS INDEX: 24.48 KG/M2 | HEIGHT: 62 IN | TEMPERATURE: 97.5 F | OXYGEN SATURATION: 99 % | DIASTOLIC BLOOD PRESSURE: 72 MMHG

## 2024-01-31 DIAGNOSIS — M48.07 SPINAL STENOSIS OF LUMBOSACRAL REGION: ICD-10-CM

## 2024-01-31 DIAGNOSIS — Z79.899 ENCOUNTER FOR LONG-TERM (CURRENT) USE OF OTHER MEDICATIONS: ICD-10-CM

## 2024-01-31 DIAGNOSIS — E55.9 VITAMIN D DEFICIENCY: ICD-10-CM

## 2024-01-31 DIAGNOSIS — M18.11 PRIMARY OSTEOARTHRITIS OF FIRST CARPOMETACARPAL JOINT OF RIGHT HAND: ICD-10-CM

## 2024-01-31 DIAGNOSIS — M19.90 INFLAMMATORY OSTEOARTHRITIS: Primary | ICD-10-CM

## 2024-01-31 PROCEDURE — 99214 OFFICE O/P EST MOD 30 MIN: CPT | Performed by: PHYSICIAN ASSISTANT

## 2024-01-31 NOTE — ASSESSMENT & PLAN NOTE
She has been experiencing worsening pain in her right first CMC joint.  She was scheduled for an injection with Dr. Cooper.

## 2024-01-31 NOTE — ASSESSMENT & PLAN NOTE
Inflammatory osteoarthritis symptoms are improving with Plaquenil.  She has no signs of active inflammation or synovitis on exam today.  We will continue to monitor her response to treatment.  Follow-up with eye doctor regularly to monitor for Plaquenil toxicity.  Labs as ordered to monitor for medication side effects and toxicity.  Follow-up in 4 months or sooner if needed.

## 2024-01-31 NOTE — PROGRESS NOTES
Assessment/Plan:    Inflammatory osteoarthritis  Inflammatory osteoarthritis symptoms are improving with Plaquenil.  She has no signs of active inflammation or synovitis on exam today.  We will continue to monitor her response to treatment.  Follow-up with eye doctor regularly to monitor for Plaquenil toxicity.  Labs as ordered to monitor for medication side effects and toxicity.  Follow-up in 4 months or sooner if needed.    Primary osteoarthritis of first carpometacarpal joint of right hand  She has been experiencing worsening pain in her right first CMC joint.  She was scheduled for an injection with Dr. Cooper.    Spinal stenosis of lumbosacral region  History of lumbar degenerative disc disease status post decompression and revision.  Continue following with pain management.    Neutropenia (HCC)  Follow-up with hematology.       Diagnoses and all orders for this visit:    Inflammatory osteoarthritis  -     CBC and differential; Future  -     Comprehensive metabolic panel; Future  -     Sedimentation rate, automated; Future  -     C-reactive protein; Future  -     UA (URINE) with reflex to Scope    Primary osteoarthritis of first carpometacarpal joint of right hand    Spinal stenosis of lumbosacral region    Vitamin D deficiency  -     Vitamin D 25 hydroxy; Future    Encounter for long-term (current) use of other medications  -     CBC and differential; Future  -     Comprehensive metabolic panel; Future  -     Sedimentation rate, automated; Future  -     C-reactive protein; Future  -     UA (URINE) with reflex to Scope        Spent 30 minutes total reviewing labs, chart notes, imaging studies, performing history and physical exam, discussing medication and treatment, counseling patient, and completing chart note.          Subjective:      Patient ID: Fidelia Morales is a 58 y.o. female.    She is here for follow-up of her osteoarthritis.  She has a history of 1st CMC osteoarthritis and had injections done in  the past.  She has had persistent pain and discomfort in her hands and fingers with occasional soft tissue swelling.  She had a musculoskeletal ultrasound done in April 2023 of her bilateral hands and wrists which revealed no evidence for inflammatory arthritis.  She did have degenerative changes with osteophytes in the DIP joints of her right hand.  In February 2023 sulfasalazine was added for inflammatory osteoarthritis symptoms.  This caused GI upset and was discontinued.  She started Plaquenil in October 2023.  Overall she has noticed an improvement in her joint pain with the addition of Plaquenil.  She has less stiffness in her hands and less swelling.  She does note that she is very sensitive to weather changes and has an increase in joint pain and stiffness because of this.  She also takes meloxicam as needed for joint pain.  More recently she has been having an increase in right first CMC joint pain.    She has a history of lumbar degenerative disc disease status post decompression and revision with chronic neuropathic symptoms.  She follows with Pain Management.  She has a history of chronic pain with likely small fiber neuropathy.  She follows with hematology for history of neutropenia.    She had labs done on 1/18/2024.  WBC 3.10, ANC 1.4.  Creatinine 0.87, GFR 73.  ESR, CRP within normal limits.            The following portions of the patient's history were reviewed and updated as appropriate: allergies, current medications, past family history, past medical history, past social history, past surgical history, and problem list.    Review of Systems   Constitutional:  Negative for chills, fatigue and fever.   HENT:  Negative for hearing loss, sore throat and tinnitus.    Eyes:  Negative for pain and visual disturbance.   Respiratory:  Negative for cough and shortness of breath.    Cardiovascular:  Negative for chest pain and palpitations.   Gastrointestinal:  Negative for abdominal pain, nausea and  "vomiting.   Genitourinary:  Negative for difficulty urinating.   Musculoskeletal:  Positive for arthralgias and back pain. Negative for gait problem, joint swelling, myalgias, neck pain and neck stiffness.   Skin:  Negative for rash.   Neurological:  Positive for numbness. Negative for dizziness, seizures, weakness and headaches.   Psychiatric/Behavioral:  Negative for confusion, decreased concentration and sleep disturbance.          Objective:      /72 (BP Location: Right arm, Patient Position: Sitting, Cuff Size: Adult)   Pulse 75   Temp 97.5 °F (36.4 °C) (Tympanic)   Ht 5' 1.5\" (1.562 m)   Wt 60.3 kg (133 lb)   SpO2 99%   BMI 24.72 kg/m²          Physical Exam  Constitutional:       Appearance: Normal appearance.   Cardiovascular:      Rate and Rhythm: Normal rate and regular rhythm.   Pulmonary:      Breath sounds: Normal breath sounds.   Musculoskeletal:      Comments: Decreased lateral flexion neck with triggers posterior cervical and shoulder girdle muscles.  Full range of motion bilateral shoulders, elbows, wrists.  Triggers lateral and medial epicondyles bilaterally.  Interphalangeal OA changes, squaring 1st CMC joints, Heberden's nodes, Masoud's nodes bilateral hands.  No synovitis noted.  Full range of motion bilateral hips, knees, ankles.  Patellofemoral crepitus noted bilateral knees.  Rearfoot hyperpronation, hammertoe deformities, midfoot cavus deformities bilateral feet.   Skin:     General: Skin is warm and dry.   Neurological:      General: No focal deficit present.      Mental Status: She is alert.         Dragon Dictation software was used to dictate this note. It may contain errors with dictating incorrect words/spelling. Please contact provider directly for any questions.    "

## 2024-01-31 NOTE — ASSESSMENT & PLAN NOTE
History of lumbar degenerative disc disease status post decompression and revision.  Continue following with pain management.

## 2024-02-14 ENCOUNTER — CLINICAL SUPPORT (OUTPATIENT)
Age: 59
End: 2024-02-14
Payer: COMMERCIAL

## 2024-02-14 VITALS
OXYGEN SATURATION: 100 % | BODY MASS INDEX: 25.28 KG/M2 | SYSTOLIC BLOOD PRESSURE: 118 MMHG | TEMPERATURE: 97.7 F | WEIGHT: 136 LBS | DIASTOLIC BLOOD PRESSURE: 68 MMHG | HEART RATE: 64 BPM

## 2024-02-14 DIAGNOSIS — M18.11 PRIMARY OSTEOARTHRITIS OF FIRST CARPOMETACARPAL JOINT OF RIGHT HAND: Primary | ICD-10-CM

## 2024-02-14 PROCEDURE — 20600 DRAIN/INJ JOINT/BURSA W/O US: CPT | Performed by: INTERNAL MEDICINE

## 2024-02-14 RX ORDER — METHYLPREDNISOLONE ACETATE 40 MG/ML
1 INJECTION, SUSPENSION INTRA-ARTICULAR; INTRALESIONAL; INTRAMUSCULAR; SOFT TISSUE
Status: COMPLETED | OUTPATIENT
Start: 2024-02-14 | End: 2024-02-14

## 2024-02-14 RX ADMIN — METHYLPREDNISOLONE ACETATE 1 ML: 40 INJECTION, SUSPENSION INTRA-ARTICULAR; INTRALESIONAL; INTRAMUSCULAR; SOFT TISSUE at 10:40

## 2024-02-14 NOTE — PROGRESS NOTES
Small joint arthrocentesis: R thumb CMC  [place] Protocol:  Consent: Verbal consent obtained.  Risks and benefits: risks, benefits and alternatives were discussed  Consent given by: patient  Timeout called at: 2/14/2024 12:00 PM.  Patient understanding: patient states understanding of the procedure being performed  Patient consent: the patient's understanding of the procedure matches consent given  Procedure consent: procedure consent matches procedure scheduled  Relevant documents: relevant documents present and verified  Test results: test results available and properly labeled  Site marked: the operative site was marked  Radiology Images displayed and confirmed. If images not available, report reviewed: imaging studies available  Patient identity confirmed: verbally with patient  Supporting Documentation  Indications: joint swelling and pain   Procedure Details  Location: thumb - R thumb CMC  Preparation: Patient was prepped and draped in the usual sterile fashion  Needle size: 22 G  Ultrasound guidance: no  Approach: dorsal  Medications administered: 1 mL methylPREDNISolone acetate 40 mg/mL    Patient tolerance: patient tolerated the procedure well with no immediate complications  Dressing:  Sterile dressing applied     Told to ice 4 times a day over the next 4 days.

## 2024-03-18 ENCOUNTER — TELEPHONE (OUTPATIENT)
Dept: HEMATOLOGY ONCOLOGY | Facility: CLINIC | Age: 59
End: 2024-03-18

## 2024-03-20 ENCOUNTER — TELEPHONE (OUTPATIENT)
Dept: HEMATOLOGY ONCOLOGY | Facility: CLINIC | Age: 59
End: 2024-03-20

## 2024-03-21 ENCOUNTER — APPOINTMENT (OUTPATIENT)
Dept: LAB | Facility: HOSPITAL | Age: 59
End: 2024-03-21
Payer: COMMERCIAL

## 2024-03-25 ENCOUNTER — OFFICE VISIT (OUTPATIENT)
Dept: HEMATOLOGY ONCOLOGY | Facility: CLINIC | Age: 59
End: 2024-03-25
Payer: COMMERCIAL

## 2024-03-25 ENCOUNTER — OFFICE VISIT (OUTPATIENT)
Age: 59
End: 2024-03-25
Payer: COMMERCIAL

## 2024-03-25 VITALS
DIASTOLIC BLOOD PRESSURE: 78 MMHG | SYSTOLIC BLOOD PRESSURE: 118 MMHG | TEMPERATURE: 97.6 F | BODY MASS INDEX: 25.11 KG/M2 | HEIGHT: 61 IN | WEIGHT: 133 LBS | HEART RATE: 78 BPM | RESPIRATION RATE: 16 BRPM | OXYGEN SATURATION: 98 %

## 2024-03-25 VITALS
DIASTOLIC BLOOD PRESSURE: 72 MMHG | SYSTOLIC BLOOD PRESSURE: 118 MMHG | HEIGHT: 61 IN | RESPIRATION RATE: 17 BRPM | WEIGHT: 133 LBS | OXYGEN SATURATION: 99 % | TEMPERATURE: 96.8 F | HEART RATE: 73 BPM | BODY MASS INDEX: 25.11 KG/M2

## 2024-03-25 DIAGNOSIS — D70.9 NEUTROPENIA, UNSPECIFIED TYPE (HCC): Primary | ICD-10-CM

## 2024-03-25 DIAGNOSIS — R22.1 LOCALIZED SWELLING, MASS AND LUMP, NECK: ICD-10-CM

## 2024-03-25 DIAGNOSIS — M06.09 SERONEGATIVE ARTHROPATHY OF MULTIPLE SITES (HCC): ICD-10-CM

## 2024-03-25 DIAGNOSIS — G44.86 CERVICOGENIC HEADACHE: Primary | ICD-10-CM

## 2024-03-25 PROCEDURE — 99214 OFFICE O/P EST MOD 30 MIN: CPT | Performed by: PHYSICIAN ASSISTANT

## 2024-03-25 PROCEDURE — 99214 OFFICE O/P EST MOD 30 MIN: CPT | Performed by: INTERNAL MEDICINE

## 2024-03-25 RX ORDER — CYCLOBENZAPRINE HCL 5 MG
5 TABLET ORAL 3 TIMES DAILY PRN
Qty: 90 TABLET | Refills: 0 | Status: SHIPPED | OUTPATIENT
Start: 2024-03-25 | End: 2024-03-25

## 2024-03-25 RX ORDER — METHYLPREDNISOLONE 4 MG/1
TABLET ORAL
Qty: 21 EACH | Refills: 0 | Status: SHIPPED | OUTPATIENT
Start: 2024-03-25 | End: 2024-03-25

## 2024-03-25 RX ORDER — METHYLPREDNISOLONE 4 MG/1
TABLET ORAL
Qty: 21 EACH | Refills: 0 | Status: SHIPPED | OUTPATIENT
Start: 2024-03-25

## 2024-03-25 RX ORDER — CYCLOBENZAPRINE HCL 5 MG
5 TABLET ORAL 3 TIMES DAILY PRN
Qty: 90 TABLET | Refills: 0 | Status: SHIPPED | OUTPATIENT
Start: 2024-03-25

## 2024-03-25 NOTE — PROGRESS NOTES
[facility] [place]  [address]  [place] [address]  Hematology Ambulatory Follow-Up  Fidelia Morales, 1965, [id number]  3/25/2024      Assessment and Plan   1. Neutropenia, unspecified type (HCC)  - Protein electrophoresis, serum; Future  - Kappa/Lambda Light Chains Free With Ratio, Serum; Future  - CBC and differential; Future  - CBC and differential; Future    2. Localized swelling, mass and lump, neck  - US head neck soft tissue; Future    58-year-old female with osteoarthritis on Plaquenil who presents as follow-up for leukopenia with reduced neutrophils.  Bone marrow biopsy in 2022 showed mild increase in plasma cell (4%) otherwise normal.  SPEP has been unremarkable.  She had a very mildly abnormal sFLC 1.69 previously.  These findings are likely reactive. Most recent CBC 03/2024: WBC 3.08 ANC 1.47, hgb 12.6, plt 278k. Patient denies consitutional symptoms.     She was found to have enlarged lymph node in left neck previously which  was palpated on exam today. She believes is getting larger.  Last ultrasound neck was performed 09/2022 which revealed 1.8 x 0.3 x 1.0 cm morphologically normal-appearing lymph node.     Discussion/Plan   Leukopenia is stable. Suspect benign etiology. Previous work up negative. Continue observation q6m   Repeat SPEP, serum free light chains   Obtain US neck to reevalaute left posterolateral enlarged LN   Follow up with PCP for headaches as planned   RTC 1 year of sooner if he develops frequent infections, unexplained fever, weight loss, significant fatigue, night sweats etc.    Patient voiced agreement and understanding to the above.   Patient advised to call the Hematology/Oncology office with any questions and concerns regarding the above.    Barrier(s) to care: None  The patient is able to self care.    Patrica Breen PA-C   Medical Oncology/Hematology  [facility]    Subjective   No chief complaint on file.      History of present illness: 58-year-old female with fatty liver disease,IBS-C, osteoarthritis on Plaquenil who presents as follow-up for isolated neutropenia.    On chart review, she has had intermittent leukopenia since 2019 with WBC 3.51 and normal differential at this time.  ANC 1.89.  No history of frequent infections. Patient has known osteoarthritis and follows with rheumatology for this. Previously on meloxicam and not taking Plaquenil since October 2023.  Avoids dairy and gluten products. History of Fatty liver since 20s, following with gastroenterology.     Acute and Chronic hepatitis testing 2/2019 - nonreactive   HIV 1/2 AG-AB combo nonreactive 08/2020  B12 10/2022 807 MMA Normal. Folate >20. Copper normal.   MAY, RF, Lyme antibody, TSH, CCP all normal. 1/19/2021 EBV IgG and EBNA-IgG elevated, past infection .   SPEP 03/2023.  No monoclonal bands.  Immunoglobulins were normal. sFLC 1.69   G6PD 2023: elevated (431)    US neck 09/2022  Ultrasound of head and neck soft tissue for localized swelling mass and lump found to have 1.8 x 0.3 x 1.0 cm morphologically normal-appearing lymph node.    Bone marrow biopsy 11/2022   Addendum 2   [facility] ([facility]#[id number], evaluated by Gunner Kimbrough, PhD, [facility]):     RESULT SUMMARY: NORMAL     DETECTED GENOMIC ALTERATIONS: No Mutations Identified     PERTINENT NEGATIVE RESULTS:  The following genes are NEGATIVE for clinically relevant mutations. Mutational hotspots and surrounding exonic regions were interrogated for DNA level point mutations and indels (fusions not assayed).  ABL1, ANKRD26, ASXL1, ATRX, BCOR, BCORL1, BRAF, CALR, CBL, CCND2, CDKN2A, CEBPA, CSF3R, CUX1, DDX41, DNMT3A, ETNK1, ETV6, EZH2, FBXW7, FLT3, GATA2, HRAS, IDH1, IDH2, JAK2, KDM6A, KIT, KMT2A, KRAS, MAP2K1, MPL, MYD88, NF1, NPM1, NRAS, PDGFRA, PHF6, PTEN, PTPN11, RUNX1, SETBP1, SF3B1, SRSF2, STAG2, TET2, TP53,  U2AF1, WT1, ZRSR2     Congo red stain  No apple green birefringence material is identified on Congo red stain (Block A1).   Addendum electronically signed by Maricarmen Brambila MD on 12/5/2022 at 11:44 AM   Addendum   Cytogenetic studies ([facility]#[id number], evaluated by Bolivar Weinstein, PhD, [facility]):     Karyotype:  46,XX[20]     Cytogenetics Analysis:  Metaphases Counted Metaphases Analyzed Metaphases Karyotyped GTG Band level  Culture Type(s)   20 20 3 350-400 24hrU      Interpretation:    A normal female chromosome complement was observed in twenty metaphases analyzed.  Within the limits of the technology utilized in this study, no consistent numerical or structural abnormality was identified.   Addendum electronically signed by Maricarmen Brambila MD on 12/2/2022 at 12:45 PM   Final Diagnosis   A-C. Bone Marrow, Left Iliac Crest, Core, Clot and Aspirate:  - Normocellular flanagan (50% cellularity) with trilineage hematopoiesis and 4% plasma cells (Kappa to Lambda ratio 2:1), cannot exclude monoclonal gammopathy of undetermined significance (MGUS) versus reactive changes.   - No evidence of leukemia or lymphoma.  - Adequate iron stores.  - No reticulin fibrosis.  - Cytogenetic/Molecular study pending.       osteoarthritis     stable, no b sx. Hx bmbx 11/2022 normal however mgus not excluded, f/u myeloma labs unrevealing, no mspike on spep. Continue to monitor. F/u 6 months labs every 3. Having early satiety, worsened over past month, will order US abdomen to exclude hepatosplenomegaly. Will be starting plaquenil, made patient aware of risk of progressive leukopenia and need for close monitoring.      She has been on plaquenil 010/2023  Prior HIV/Hep C negative   EBV IgG    03/25/24 :  Lab Results   Component Value Date    IRON 126 10/18/2022    TIBC 271 10/18/2022    FERRITIN 110 10/18/2022     Lab Results   Component Value Date    WBC 3.08 (L) 03/21/2024    HGB 12.6 03/21/2024    HCT 37.9 03/21/2024    MCV 89 03/21/2024      03/21/2024       Interval history: new daily headache x 4 days. No vision changes. She has follow up with her PCP later today.  Denies fever, drenching night sweats, unintentional weight loss or significant fatigue.  She has occasional fatigue fatigue from time to time when she overexerts herself.  Denies frequent infections requiring antibiotics.    Review of Systems   All other systems reviewed and are negative.      Patient Active Problem List   Diagnosis    Extrinsic asthma    Spinal stenosis of lumbosacral region    Irritable bowel syndrome    Borderline hypercholesterolemia    HNP (herniated nucleus pulposus), lumbar    S/P lumbar spine operation    Intramural leiomyoma of uterus    Chronic idiopathic constipation    Primary generalized (osteo)arthritis    Paresthesias    Fatty liver    Primary osteoarthritis of both first carpometacarpal joints    Neutropenia (HCC)    Primary osteoarthritis of first carpometacarpal joint of right hand    Inflammatory osteoarthritis    Erosive osteoarthritis    Seronegative arthropathy of multiple sites (HCC)    Post laminectomy syndrome     Past Medical History:   Diagnosis Date    Abdominal pain     Arthralgia of pelvic region and thigh     Chest pain     CTS (carpal tunnel syndrome)     Fibromyalgia     Hepatitis     Leukocytopenia     Leukorrhea     Temporomandibular joint sounds on opening and/or closing the jaw      Past Surgical History:   Procedure Laterality Date    BACK SURGERY      CHOLECYSTECTOMY      FOOT SURGERY Right     INCONTINENCE SURGERY      vaginal sling for stress incontinence    IR BIOPSY BONE MARROW  11/29/2022    LUMBAR FUSION  03/06/2019    [facility]    SINUS SURGERY      SPINAL FUSION  3/5/2019    SPINE SURGERY  03/05/2019     Family History   Problem Relation Age of Onset    Alzheimer's disease Father     Asthma Father     Heart attack Father     COPD Father     Hypertension Father     Alzheimer's disease Maternal Grandmother     Lymphoma Maternal Aunt      Emphysema Maternal Grandfather      Social History     Socioeconomic History    Marital status: /Civil Union     Spouse name: None    Number of children: None    Years of education: None    Highest education level: None   Occupational History    Occupation: homemaker   Tobacco Use    Smoking status: Never    Smokeless tobacco: Never   Vaping Use    Vaping status: Never Used   Substance and Sexual Activity    Alcohol use: No    Drug use: No    Sexual activity: Yes     Partners: Male     Birth control/protection: Female Sterilization, None   Other Topics Concern    None   Social History Narrative    None     Social Determinants of Health     Financial Resource Strain: Not on file   Food Insecurity: Not on file   Transportation Needs: Not on file   Physical Activity: Insufficiently Active (8/25/2020)    Exercise Vital Sign     Days of Exercise per Week: 2 days     Minutes of Exercise per Session: 40 min   Stress: No Stress Concern Present (8/25/2020)    [employer] [place] [employer] - Occupational Stress Questionnaire     Feeling of Stress : Only a little   Social Connections: Not on file   Intimate Partner Violence: Not on file   Housing Stability: Not on file       Current Outpatient Medications:     acetaminophen (TYLENOL) 325 mg tablet, Take 500 mg by mouth as needed for mild pain, Disp: , Rfl:     acetaminophen (TYLENOL) 325 mg tablet, Take 2 tablets (650 mg total) by mouth every 6 (six) hours as needed for mild pain, Disp: 40 tablet, Rfl: 0    Ascorbic Acid (Vitamin C ER) 1000 MG TBCR, Take 1,000 mg by mouth, Disp: , Rfl:     Cholecalciferol 50 MCG (2000 UT) TABS, Take 2,000 Units by mouth, Disp: , Rfl:     gabapentin (NEURONTIN) 800 mg tablet, Take 1 tablet (800 mg total) by mouth 3 (three) times a day, Disp: 270 tablet, Rfl: 1    hydroxychloroquine (PLAQUENIL) 200 mg tablet, Take 1 tablet (200 mg total) by mouth 2 (two) times a day with meals, Disp: 180 tablet, Rfl: 0    linaCLOtide  "(Linzess) 145 MCG CAPS, Take 1 capsule (145 mcg total) by mouth daily, Disp: 90 capsule, Rfl: 3    meloxicam (MOBIC) 7.5 mg tablet, Take 1 tablet (7.5 mg total) by mouth daily as needed for moderate pain, Disp: 90 tablet, Rfl: 1    minoxidil (LONITEN) 2.5 mg tablet, TAKE ONE-HALF (1/2) TABLET DAILY, Disp: 45 tablet, Rfl: 3    mometasone (NASONEX) 50 mcg/act nasal spray, USE 2 SPRAYS IN EACH NOSTRIL DAILY, Disp: 51 g, Rfl: 3    multivitamin (THERAGRAN) TABS, Take 1 tablet by mouth daily, Disp: , Rfl:   Allergies   Allergen Reactions    Other Sneezing and Nasal Congestion     SEASONAL ALLERGIES       Objective   /78   Pulse 78   Temp 97.6 °F (36.4 °C) (Temporal)   Resp 16   Ht 5' 1\" (1.549 m)   Wt 60.3 kg (133 lb)   SpO2 98%   BMI 25.13 kg/m²    Physical Exam  Vitals reviewed.   HENT:      Head: Normocephalic.   Eyes:      Extraocular Movements: Extraocular movements intact.      Pupils: Pupils are equal, round, and reactive to light.   Cardiovascular:      Rate and Rhythm: Normal rate and regular rhythm.      Heart sounds: Normal heart sounds.   Pulmonary:      Effort: Pulmonary effort is normal.      Breath sounds: Normal breath sounds.   Abdominal:      Palpations: Abdomen is soft.      Tenderness: There is no abdominal tenderness.   Musculoskeletal:      Cervical back: Neck supple.      Right lower leg: No edema.      Left lower leg: No edema.   Lymphadenopathy:      Cervical: Cervical adenopathy (left posterolateral cervcial chain. [place]. ~1.5cm) present.   Skin:     Findings: No rash.   Neurological:      Mental Status: She is alert.         Result Review  Labs:  No visits with results within 30 Day(s) from this visit.   Latest known visit with results is:   Admission on 01/24/2024, Discharged on 01/24/2024   Component Date Value Ref Range Status    WBC 01/24/2024 4.60  4.31 - 10.16 Thousand/uL Final    RBC 01/24/2024 4.33  3.81 - 5.12 Million/uL Final    Hemoglobin 01/24/2024 12.8  11.5 - 15.4 " g/dL Final    Hematocrit 01/24/2024 37.6  34.8 - 46.1 % Final    MCV 01/24/2024 87  82 - 98 fL Final    MCH 01/24/2024 29.6  26.8 - 34.3 pg Final    MCHC 01/24/2024 34.0  31.4 - 37.4 g/dL Final    RDW 01/24/2024 12.3  11.6 - 15.1 % Final    MPV 01/24/2024 10.9  8.9 - 12.7 fL Final    Platelets 01/24/2024 243  149 - 390 Thousands/uL Final    nRBC 01/24/2024 0  /100 WBCs Final    Neutrophils Relative 01/24/2024 47  43 - 75 % Final    Immature Grans % 01/24/2024 0  0 - 2 % Final    Lymphocytes Relative 01/24/2024 42  14 - 44 % Final    Monocytes Relative 01/24/2024 8  4 - 12 % Final    Eosinophils Relative 01/24/2024 2  0 - 6 % Final    Basophils Relative 01/24/2024 1  0 - 1 % Final    Neutrophils Absolute 01/24/2024 2.20  1.85 - 7.62 Thousands/µL Final    Absolute Immature Grans 01/24/2024 0.01  0.00 - 0.20 Thousand/uL Final    Absolute Lymphocytes 01/24/2024 1.91  0.60 - 4.47 Thousands/µL Final    Absolute Monocytes 01/24/2024 0.37  0.17 - 1.22 Thousand/µL Final    Eosinophils Absolute 01/24/2024 0.07  0.00 - 0.61 Thousand/µL Final    Basophils Absolute 01/24/2024 0.04  0.00 - 0.10 Thousands/µL Final    Protime 01/24/2024 12.9  11.6 - 14.5 seconds Final    INR 01/24/2024 0.92  0.84 - 1.19 Final    PTT 01/24/2024 27  23 - 37 seconds Final    Therapeutic Heparin Range =  60-90 seconds    Sodium 01/24/2024 139  135 - 147 mmol/L Final    Potassium 01/24/2024 3.8  3.5 - 5.3 mmol/L Final    Chloride 01/24/2024 106  96 - 108 mmol/L Final    CO2 01/24/2024 26  21 - 32 mmol/L Final    ANION GAP 01/24/2024 7  mmol/L Final    BUN 01/24/2024 12  5 - 25 mg/dL Final    Creatinine 01/24/2024 0.66  0.60 - 1.30 mg/dL Final    Standardized to IDMS reference method    Glucose 01/24/2024 84  65 - 140 mg/dL Final    If the patient is fasting, the ADA then defines impaired fasting glucose as > 100 mg/dL and diabetes as > or equal to 123 mg/dL.    Calcium 01/24/2024 9.7  8.4 - 10.2 mg/dL Final    AST 01/24/2024 25  13 - 39 U/L Final    ALT  01/24/2024 33  7 - 52 U/L Final    Specimen collection should occur prior to Sulfasalazine administration due to the potential for falsely depressed results.     Alkaline Phosphatase 01/24/2024 77  34 - 104 U/L Final    Total Protein 01/24/2024 7.9  6.4 - 8.4 g/dL Final    Albumin 01/24/2024 4.6  3.5 - 5.0 g/dL Final    Total Bilirubin 01/24/2024 0.42  0.20 - 1.00 mg/dL Final    Use of this assay is not recommended for patients undergoing treatment with eltrombopag due to the potential for falsely elevated results.  N-acetyl-p-benzoquinone imine (metabolite of Acetaminophen) will generate erroneously low results in samples for patients that have taken an overdose of Acetaminophen.    eGFR 01/24/2024 97  ml/min/1.73sq m Final    Lipase 01/24/2024 35  11 - 82 u/L Final    SARS-CoV-2 01/24/2024 Negative  Negative Final    INFLUENZA A PCR 01/24/2024 Negative  Negative Final    INFLUENZA B PCR 01/24/2024 Negative  Negative Final    RSV PCR 01/24/2024 Negative  Negative Final    Color, UA 01/24/2024 Colorless   Final    Clarity, UA 01/24/2024 Clear   Final    Specific Gravity, UA 01/24/2024 1.005  1.003 - 1.030 Final    pH, UA 01/24/2024 5.0  4.5, 5.0, 5.5, 6.0, 6.5, 7.0, 7.5, 8.0 Final    Leukocytes, UA 01/24/2024 Negative  Negative Final    Nitrite, UA 01/24/2024 Negative  Negative Final    Protein, UA 01/24/2024 Negative  Negative mg/dl Final    Glucose, UA 01/24/2024 Negative  Negative mg/dl Final    Ketones, UA 01/24/2024 Negative  Negative mg/dl Final    Urobilinogen, UA 01/24/2024 <2.0  <2.0 mg/dl mg/dl Final    Bilirubin, UA 01/24/2024 Negative  Negative Final    Occult Blood, UA 01/24/2024 Negative  Negative Final       Imaging:   I reviewed relevant imaging    Please note:  This report has been generated by a voice recognition software system. Therefore there may be syntax, spelling, and/or grammatical errors. Please call if you have any questions.

## 2024-03-25 NOTE — PROGRESS NOTES
Assessment/Plan       Cervicogenic headache    Work on range of motion and stretching exercises with the neck. Use heat and massage prn.  Continue gabapentin and meloxicam as prescribed  Will add on course of muscle relaxer and medrol dose pack  No neurological abnormalities on exam today to warrant neuroimaging  Discussed signs/symptoms to monitor for that would warrant ER evaluation    - cyclobenzaprine (FLEXERIL) 5 mg tablet; Take 1 tablet (5 mg total) by mouth 3 (three) times a day as needed for muscle spasms  Dispense: 90 tablet; Refill: 0  - methylPREDNISolone 4 MG tablet therapy pack; Use as directed on package  Dispense: 21 each; Refill: 0    Seronegative arthropathy of multiple sites (HCC)    Stable on plaquenil. Continue regular follow-up with rheumatology.            Subjective     History of Present Illness  The patient presents for evaluation of headaches.    She has been having really bad headaches. It started last Friday. When she woke up, her neck was very stiff and she could not even turn to the right. From then, the headaches started coming up on the neck side behind the ear. It takes the whole head from the back. Anything quiet in a dark room gives her relief. She can not even look at the phone as she sees blur and it gives her a headache right away. She does not suffer from headaches. On Friday, it was just a stiff neck, but on Tuesday, it was just pounding headache. Last night, she did not even sleep at all. She developed foot pain last night. She could not even step on the ground and her  had to carry her everywhere. Today, she is fine. She denies any weakness in the foot. Nothing makes the headache worse. Her neck area has been bothering her more. She takes meloxicam. She has taken a muscle relaxant before. She denies any ear symptoms. She was sick a few months ago. She drinks 1 cup of caffeine a day.     Review of Systems   Constitutional: Negative.    Respiratory: Negative.    "  Cardiovascular: Negative.    Gastrointestinal: Negative.    Musculoskeletal:  Positive for neck pain and neck stiffness.   Neurological:  Positive for headaches.   Psychiatric/Behavioral:  Positive for sleep disturbance.      Objective     /72 (BP Location: Left arm, Patient Position: Sitting, Cuff Size: Large)   Pulse 73   Temp (!) 96.8 °F (36 °C) (Tympanic)   Resp 17   Ht 5' 1\" (1.549 m)   Wt 60.3 kg (133 lb)   SpO2 99%   BMI 25.13 kg/m²     Physical Exam  Constitutional:       General: She is not in acute distress.     Appearance: She is not ill-appearing.   Cardiovascular:      Rate and Rhythm: Normal rate and regular rhythm.      Heart sounds: No murmur heard.  Pulmonary:      Effort: Pulmonary effort is normal. No respiratory distress.      Breath sounds: No wheezing.   Musculoskeletal:         General: Deformity (cervical and trapezius paraspinal muscle spasm and hypertonicity) present.      Right lower leg: No edema.      Left lower leg: No edema.   Skin:     General: Skin is warm.      Findings: No lesion or rash.   Neurological:      Mental Status: She is alert. Mental status is at baseline.      Cranial Nerves: No cranial nerve deficit.      Motor: No weakness.      Gait: Gait normal.        Toñito Escalera, DO   "

## 2024-03-29 ENCOUNTER — HOSPITAL ENCOUNTER (OUTPATIENT)
Dept: GASTROENTEROLOGY | Facility: HOSPITAL | Age: 59
Setting detail: OUTPATIENT SURGERY
End: 2024-03-29
Payer: COMMERCIAL

## 2024-03-29 ENCOUNTER — ANESTHESIA (OUTPATIENT)
Dept: GASTROENTEROLOGY | Facility: HOSPITAL | Age: 59
End: 2024-03-29

## 2024-03-29 ENCOUNTER — ANESTHESIA EVENT (OUTPATIENT)
Dept: GASTROENTEROLOGY | Facility: HOSPITAL | Age: 59
End: 2024-03-29

## 2024-03-29 VITALS
HEIGHT: 61 IN | OXYGEN SATURATION: 100 % | WEIGHT: 132.06 LBS | HEART RATE: 71 BPM | BODY MASS INDEX: 24.93 KG/M2 | DIASTOLIC BLOOD PRESSURE: 65 MMHG | TEMPERATURE: 97.4 F | RESPIRATION RATE: 16 BRPM | SYSTOLIC BLOOD PRESSURE: 144 MMHG

## 2024-03-29 DIAGNOSIS — Z12.11 SCREENING FOR COLON CANCER: ICD-10-CM

## 2024-03-29 PROBLEM — D69.6 THROMBOCYTOPENIA (HCC): Status: ACTIVE | Noted: 2024-03-29

## 2024-03-29 PROCEDURE — 88305 TISSUE EXAM BY PATHOLOGIST: CPT | Performed by: PATHOLOGY

## 2024-03-29 RX ORDER — LIDOCAINE HYDROCHLORIDE 20 MG/ML
INJECTION, SOLUTION EPIDURAL; INFILTRATION; INTRACAUDAL; PERINEURAL AS NEEDED
Status: DISCONTINUED | OUTPATIENT
Start: 2024-03-29 | End: 2024-03-29

## 2024-03-29 RX ORDER — SODIUM CHLORIDE, SODIUM LACTATE, POTASSIUM CHLORIDE, CALCIUM CHLORIDE 600; 310; 30; 20 MG/100ML; MG/100ML; MG/100ML; MG/100ML
INJECTION, SOLUTION INTRAVENOUS CONTINUOUS PRN
Status: DISCONTINUED | OUTPATIENT
Start: 2024-03-29 | End: 2024-03-29

## 2024-03-29 RX ORDER — PROPOFOL 10 MG/ML
INJECTION, EMULSION INTRAVENOUS AS NEEDED
Status: DISCONTINUED | OUTPATIENT
Start: 2024-03-29 | End: 2024-03-29

## 2024-03-29 RX ADMIN — PROPOFOL 50 MG: 10 INJECTION, EMULSION INTRAVENOUS at 12:49

## 2024-03-29 RX ADMIN — PROPOFOL 50 MG: 10 INJECTION, EMULSION INTRAVENOUS at 12:47

## 2024-03-29 RX ADMIN — LIDOCAINE HYDROCHLORIDE 60 MG: 20 INJECTION, SOLUTION EPIDURAL; INFILTRATION; INTRACAUDAL; PERINEURAL at 12:45

## 2024-03-29 RX ADMIN — SODIUM CHLORIDE, SODIUM LACTATE, POTASSIUM CHLORIDE, AND CALCIUM CHLORIDE: .6; .31; .03; .02 INJECTION, SOLUTION INTRAVENOUS at 12:42

## 2024-03-29 RX ADMIN — PROPOFOL 100 MG: 10 INJECTION, EMULSION INTRAVENOUS at 12:45

## 2024-03-29 NOTE — ANESTHESIA PREPROCEDURE EVALUATION
Procedure:  COLONOSCOPY    Relevant Problems   ANESTHESIA (within normal limits)      CARDIO   (+) Borderline hypercholesterolemia      GI/HEPATIC   (+) Fatty liver      /RENAL (within normal limits)      HEMATOLOGY (within normal limits)      MUSCULOSKELETAL   (+) Erosive osteoarthritis   (+) Inflammatory osteoarthritis   (+) Primary generalized (osteo)arthritis   (+) Primary osteoarthritis of both first carpometacarpal joints   (+) Primary osteoarthritis of first carpometacarpal joint of right hand      NEURO/PSYCH   (+) Paresthesias      PULMONARY   (+) Extrinsic asthma (Denies)      Neurology/Sleep   (+) S/P lumbar spine operation        Physical Exam    Airway    Mallampati score: I  TM Distance: >3 FB  Neck ROM: full     Dental   No notable dental hx     Cardiovascular  Cardiovascular exam normal    Pulmonary  Pulmonary exam normal     Other Findings  post-pubertal.      Anesthesia Plan  ASA Score- 2     Anesthesia Type- IV sedation with anesthesia with ASA Monitors.         Additional Monitors:     Airway Plan:            Plan Factors-Exercise tolerance (METS): >4 METS.    Chart reviewed. EKG reviewed. Imaging results reviewed. Existing labs reviewed. Patient summary reviewed.    Patient is not a current smoker.              Induction- intravenous.    Postoperative Plan-     Informed Consent- Anesthetic plan and risks discussed with patient.  I personally reviewed this patient with the CRNA. Discussed and agreed on the Anesthesia Plan with the CRNA..

## 2024-03-29 NOTE — ANESTHESIA POSTPROCEDURE EVALUATION
Post-Op Assessment Note    CV Status:  Stable  Pain Score: 0    Pain management: adequate       Mental Status:  Alert and awake   Hydration Status:  Euvolemic   PONV Controlled:  Controlled   Airway Patency:  Patent     Post Op Vitals Reviewed: Yes    No anethesia notable event occurred.    Staff: CRNA               /57 (03/29/24 1256)    Temp (!) 97.4 °F (36.3 °C) (03/29/24 1256)    Pulse 72 (03/29/24 1256)   Resp 16 (03/29/24 1256)    SpO2 98 % (03/29/24 1256)

## 2024-03-29 NOTE — H&P
"History and Physical -  [facility]  Fidelia Morales 58 y.o. female MRN: [id number]                  HPI: Fidelia Morales is a 58 y.o. year old female who presents for colonoscopy for colon cancer screening      REVIEW OF SYSTEMS: Per the HPI, and otherwise unremarkable.    Historical Information   Past Medical History:   Diagnosis Date    Abdominal pain     Arthralgia of pelvic region and thigh     Chest pain     CTS (carpal tunnel syndrome)     Fibromyalgia     Hepatitis     Leukocytopenia     Leukorrhea     Temporomandibular joint sounds on opening and/or closing the jaw      Past Surgical History:   Procedure Laterality Date    BACK SURGERY      CHOLECYSTECTOMY      FOOT SURGERY Right     INCONTINENCE SURGERY      vaginal sling for stress incontinence    IR BIOPSY BONE MARROW  11/29/2022    LUMBAR FUSION  03/06/2019    [facility]    SINUS SURGERY      SPINAL FUSION  3/5/2019    SPINE SURGERY  03/05/2019     Social History   Social History     Substance and Sexual Activity   Alcohol Use No     Social History     Substance and Sexual Activity   Drug Use No     Social History     Tobacco Use   Smoking Status Never   Smokeless Tobacco Never     Family History   Problem Relation Age of Onset    Alzheimer's disease Father     Asthma Father     Heart attack Father     COPD Father     Hypertension Father     Alzheimer's disease Maternal Grandmother     Lymphoma Maternal Aunt     Emphysema Maternal Grandfather        Meds/Allergies     (Not in a hospital admission)      Allergies   Allergen Reactions    Other Sneezing and Nasal Congestion     SEASONAL ALLERGIES       Objective     Blood pressure 147/84, pulse 75, temperature (!) 97.1 °F (36.2 °C), temperature source Temporal, resp. rate 17, height 5' 1\" (1.549 m), weight 59.9 kg (132 lb 0.9 oz), SpO2 100%, not currently breastfeeding.      PHYSICAL EXAM    /84   Pulse 75   Temp (!) 97.1 °F (36.2 °C) (Temporal)   Resp 17   Ht 5' 1\" (1.549 m)   Wt " 59.9 kg (132 lb 0.9 oz)   SpO2 100%   BMI 24.95 kg/m²       Gen: NAD  CV: RRR  CHEST: Clear  ABD: soft, NT/ND  EXT: no edema      ASSESSMENT/PLAN:  This is a 58 y.o. year old female here for colonoscopy, and she is stable and optimized for her procedure.

## 2024-04-09 ENCOUNTER — HOSPITAL ENCOUNTER (OUTPATIENT)
Dept: ULTRASOUND IMAGING | Facility: HOSPITAL | Age: 59
Discharge: HOME | End: 2024-04-09
Payer: COMMERCIAL

## 2024-04-09 DIAGNOSIS — R22.1 LOCALIZED SWELLING, MASS AND LUMP, NECK: ICD-10-CM

## 2024-04-09 PROCEDURE — 76536 US EXAM OF HEAD AND NECK: CPT

## 2024-05-02 DIAGNOSIS — L65.9 HAIR LOSS: ICD-10-CM

## 2024-05-02 RX ORDER — MINOXIDIL 2.5 MG/1
TABLET ORAL
Qty: 45 TABLET | Refills: 3 | Status: SHIPPED | OUTPATIENT
Start: 2024-05-02

## 2024-05-16 NOTE — PROGRESS NOTES
Pain Medicine Follow-Up Note    Assessment:  1. Chronic pain syndrome    2. Lumbar radiculopathy    3. Lumbar post-laminectomy syndrome    4. Neuropathy        Plan:    New Medications Ordered This Visit   Medications    gabapentin (NEURONTIN) 800 mg tablet     Sig: Take 1 tablet (800 mg total) by mouth 2 (two) times a day AND 1.5 tablets (1,200 mg total) daily at bedtime.     Dispense:  315 tablet     Refill:  1    meloxicam (MOBIC) 7.5 mg tablet     Sig: Take 1 tablet (7.5 mg total) by mouth daily as needed for moderate pain     Dispense:  90 tablet     Refill:  1       My impressions and treatment recommendations were discussed in detail with the patient who verbalized understanding and had no further questions.      The patient returns the office stating that her pain symptoms are worse, she feels that the gabapentin is not as effective.  Patient is currently using 800 mg 3 times daily, patient states that her pain is most severe at night.  Patient may take 800 mg twice daily and 1200 mg at bedtime however I did express to the patient that this would be the maximum amount of gabapentin that I would likely prescribe.  Discussed amitriptyline patient states that she is not interested in taking antidepressant medications.  Patient expressed that sometimes her skin is just so sensitive to even touch due to pain.  I discussed her trialing a compounded cream from [employer] for neuropathy.  Patient is willing to trial this and also continue meloxicam 7.5 mg tablet daily as needed for moderate pain.  Recent labs reviewed. The patient was cautioned about possible side effects and risks of NSAID medications including stomach upset, stomach ulcers, kidney risks and cardiovascular risks to include hypertension and slightly increased risks of stroke and MI.      Follow-up is planned in 6 months time or sooner as warranted.  Discharge instructions were provided. I personally saw and examined the patient and I agree  with the above discussed plan of care.    History of Present Illness:    Fidelia Morales is a 59 y.o. female who presents to [facility] for interval re-evaluation of the above stated pain complaints. The patient has a past medical and chronic pain history as outlined in the assessment section. She was last seen on 11/17/2023.    At today's visit patient states that their pain symptoms are worse with a pain score of 8/10 on the verbal numeric pain scale.  The patient's pain is worse in the morning and at night.  The patient's pain is constant in nature.  And the quality of the patient's pain is described as sharp, cramping, pressure-like, shooting, and pins-and-needles.  The patient's pain is located in the bilateral lower legs.  Patient states the amount of pain relief she is now obtaining from her current pain relievers is not enough to make a difference in her life.  Patient denies any significant side effects using gabapentin or meloxicam.    Other than as stated above, the patient denies any interval changes in medications, medical condition, mental condition, symptoms, or allergies since the last office visit.         Review of Systems:    Review of Systems   Respiratory:  Negative for shortness of breath.    Cardiovascular:  Negative for chest pain.   Gastrointestinal:  Negative for constipation, diarrhea, nausea and vomiting.   Musculoskeletal:  Positive for arthralgias, back pain and gait problem. Negative for joint swelling and myalgias.        DROM   Skin:  Negative for rash.   Neurological:  Negative for dizziness, seizures and weakness.   All other systems reviewed and are negative.        Past Medical History:   Diagnosis Date    Abdominal pain     Arthralgia of pelvic region and thigh     Chest pain     CTS (carpal tunnel syndrome)     Fibromyalgia     Hepatitis     Leukocytopenia     Leukorrhea     Temporomandibular joint sounds on opening and/or closing the jaw        Past  Surgical History:   Procedure Laterality Date    BACK SURGERY      CHOLECYSTECTOMY      FOOT SURGERY Right     INCONTINENCE SURGERY      vaginal sling for stress incontinence    IR BIOPSY BONE MARROW  11/29/2022    LUMBAR FUSION  03/06/2019    [facility]    SINUS SURGERY      SPINAL FUSION  3/5/2019    SPINE SURGERY  03/05/2019       Family History   Problem Relation Age of Onset    Alzheimer's disease Father     Asthma Father     Heart attack Father     COPD Father     Hypertension Father     Alzheimer's disease Maternal Grandmother     Lymphoma Maternal Aunt     Emphysema Maternal Grandfather        Social History     Occupational History    Occupation: homemaker   Tobacco Use    Smoking status: Never    Smokeless tobacco: Never   Vaping Use    Vaping status: Never Used   Substance and Sexual Activity    Alcohol use: No    Drug use: No    Sexual activity: Yes     Partners: Male     Birth control/protection: Female Sterilization, None         Current Outpatient Medications:     acetaminophen (TYLENOL) 325 mg tablet, Take 500 mg by mouth as needed for mild pain, Disp: , Rfl:     Ascorbic Acid (Vitamin C ER) 1000 MG TBCR, Take 1,000 mg by mouth, Disp: , Rfl:     Cholecalciferol 50 MCG (2000 UT) TABS, Take 2,000 Units by mouth, Disp: , Rfl:     cyclobenzaprine (FLEXERIL) 5 mg tablet, Take 1 tablet (5 mg total) by mouth 3 (three) times a day as needed for muscle spasms, Disp: 90 tablet, Rfl: 0    gabapentin (NEURONTIN) 800 mg tablet, Take 1 tablet (800 mg total) by mouth 2 (two) times a day AND 1.5 tablets (1,200 mg total) daily at bedtime., Disp: 315 tablet, Rfl: 1    hydroxychloroquine (PLAQUENIL) 200 mg tablet, Take 1 tablet (200 mg total) by mouth 2 (two) times a day with meals, Disp: 180 tablet, Rfl: 0    linaCLOtide (Linzess) 145 MCG CAPS, Take 1 capsule (145 mcg total) by mouth daily, Disp: 90 capsule, Rfl: 3    meloxicam (MOBIC) 7.5 mg tablet, Take 1 tablet (7.5 mg total) by mouth daily as needed for moderate pain,  "Disp: 90 tablet, Rfl: 1    methylPREDNISolone 4 MG tablet therapy pack, Use as directed on package, Disp: 21 each, Rfl: 0    minoxidil (LONITEN) 2.5 mg tablet, TAKE ONE-HALF (1/2) TABLET DAILY, Disp: 45 tablet, Rfl: 3    mometasone (NASONEX) 50 mcg/act nasal spray, USE 2 SPRAYS IN EACH NOSTRIL DAILY, Disp: 51 g, Rfl: 3    multivitamin (THERAGRAN) TABS, Take 1 tablet by mouth daily, Disp: , Rfl:     Allergies   Allergen Reactions    Other Sneezing and Nasal Congestion     SEASONAL ALLERGIES       Physical Exam:    /77   Pulse 67   Ht 5' 1\" (1.549 m)   Wt 61.2 kg (135 lb)   BMI 25.51 kg/m²     Constitutional:normal, well developed, well nourished, alert, in no distress and non-toxic and no overt pain behavior.  Eyes:anicteric  HEENT:grossly intact  Neck:supple, symmetric, trachea midline and no masses   Pulmonary:even and unlabored  Cardiovascular:No edema or pitting edema present  Skin:Normal without rashes or lesions and well hydrated  Psychiatric:Mood and affect appropriate  Neurologic:Cranial Nerves II-XII grossly intact  Musculoskeletal:normal gait      This document was created using speech voice recognition software.   Grammatical errors, random word insertions, pronoun errors, and incomplete sentences are an occasional consequence of this system due to software limitations, ambient noise, and hardware issues.   Any formal questions or concerns about content, text, or information contained within the body of this dictation should be directly addressed to the provider for clarification.  "

## 2024-05-17 ENCOUNTER — OFFICE VISIT (OUTPATIENT)
Dept: PAIN MEDICINE | Facility: CLINIC | Age: 59
End: 2024-05-17

## 2024-05-17 VITALS
DIASTOLIC BLOOD PRESSURE: 77 MMHG | SYSTOLIC BLOOD PRESSURE: 114 MMHG | WEIGHT: 135 LBS | HEIGHT: 61 IN | HEART RATE: 67 BPM | BODY MASS INDEX: 25.49 KG/M2

## 2024-05-17 DIAGNOSIS — M96.1 LUMBAR POST-LAMINECTOMY SYNDROME: ICD-10-CM

## 2024-05-17 DIAGNOSIS — G62.9 NEUROPATHY: ICD-10-CM

## 2024-05-17 DIAGNOSIS — G89.4 CHRONIC PAIN SYNDROME: Primary | ICD-10-CM

## 2024-05-17 DIAGNOSIS — M54.16 LUMBAR RADICULOPATHY: ICD-10-CM

## 2024-05-17 RX ORDER — MELOXICAM 7.5 MG/1
7.5 TABLET ORAL DAILY PRN
Qty: 90 TABLET | Refills: 1 | Status: SHIPPED | OUTPATIENT
Start: 2024-05-17

## 2024-05-17 RX ORDER — GABAPENTIN 800 MG/1
TABLET ORAL
Qty: 315 TABLET | Refills: 1 | Status: SHIPPED | OUTPATIENT
Start: 2024-05-17

## 2024-05-17 NOTE — PATIENT INSTRUCTIONS
Gabapentin (By mouth)   Gabapentin (bert-a-PEN-tin)  Treats seizures and pain caused by shingles.   Brand Name(s): FusePaq Fanatrex, Neurontin   There may be other brand names for this medicine.  When This Medicine Should Not Be Used:   This medicine is not right for everyone. Do not use it if you had an allergic reaction to gabapentin.  How to Use This Medicine:   Capsule, Liquid, Tablet  Take your medicine as directed. Your dose may need to be changed several times to find what works best for you. If you have epilepsy, do not allow more than 12 hours to pass between doses.  Capsule: Swallow the capsule whole with plenty of water. Do not open, crush, or chew it.  Gralise® tablet: Swallow the tablet whole . Do not crush, break, or chew it.  Neurontin® tablet: If you break a tablet into 2 pieces, use the second half as your next dose. Do not use the half-tablet if the whole tablet has been cut or broken after 28 days.  Oral liquid: Measure the oral liquid medicine with a marked measuring spoon, oral syringe, or medicine cup.  This medicine should come with a Medication Guide. Ask your pharmacist for a copy if you do not have one.  Missed dose: Take a dose as soon as you remember. If it is almost time for your next dose, wait until then and take a regular dose. Do not take extra medicine to make up for a missed dose.  Store the medicine in a closed container at room temperature, away from heat, moisture, and direct light. Store the Neurontin® oral liquid in the refrigerator. Do not freeze.  Drugs and Foods to Avoid:   Ask your doctor or pharmacist before using any other medicine, including over-the-counter medicines, vitamins, and herbal products.  Some medicines can affect how gabapentin works. Tell your doctor if you also using hydrocodone or morphine.  If you take an antacid, wait at least 2 hours before you take gabapentin.  Do not drink alcohol while you are using this medicine.  Tell your doctor if you use  anything else that makes you sleepy. Some examples are allergy medicine, narcotic pain medicine, and alcohol. Tell your doctor if you are also using lorazepam, oxycodone, or zolpidem.  Warnings While Using This Medicine:   Tell your doctor if you are pregnant or breastfeeding, or if you have kidney problems (including patients receiving dialysis) or lung problems. Tell your doctor if you have a history of depression or mental health problems.  This medicine may cause the following problems:  Drug reaction with eosinophilia and systemic symptoms (DRESS) or multiorgan hypersensitivity, which may damage the liver, kidney, blood, heart, or muscles  Changes in mood or behavior, including suicidal thoughts or behavior  Respiratory depression (serious breathing problem that can be life-threatening), when used with narcotic pain medicines  Do not stop using this medicine suddenly. Your doctor will need to slowly decrease your dose before you stop it completely.  This medicine may make you dizzy or drowsy. Do not drive or do anything else that could be dangerous until you know how this medicine affects you.  Tell any doctor or dentist who treats you that you are using this medicine. This medicine may affect certain medical test results.  Your doctor will check your progress and the effects of this medicine at regular visits. Keep all appointments.  Keep all medicine out of the reach of children. Never share your medicine with anyone.  Possible Side Effects While Using This Medicine:   Call your doctor right away if you notice any of these side effects:  Allergic reaction: Itching or hives, swelling in your face or hands, swelling or tingling in your mouth or throat, chest tightness, trouble breathing  Behavior problems, aggression, restlessness, trouble concentrating, moodiness (especially in children)  Blistering, peeling, red skin rash  Blue lips, fingernails, or skin, chest pain, fast heartbeat, trouble breathing  Change  in how much or how often you urinate, bloody or cloudy urine  Dark urine or pale stools, nausea, vomiting, loss of appetite, stomach pain, yellow skin or eyes  Fever, chills, cough, sore throat, body aches  Problems with coordination, shakiness, unsteadiness, unusual eye movement  Rapid weight gain, swelling in your hands, ankles, or feet  Rash, swollen or tender glands in the neck, armpit, or groin  Unusual moods or behaviors, thoughts of hurting yourself, feeling depressed  If you notice these less serious side effects, talk with your doctor:   Dizziness, drowsiness, sleepiness, tiredness  If you notice other side effects that you think are caused by this medicine, tell your doctor.   Call your doctor for medical advice about side effects. You may report side effects to FDA at 9-659-FDA-0771    © Copyright [employer] 2022 Information is for End User's use only and may not be sold, redistributed or otherwise used for commercial purposes.  The above information is an  only. It is not intended as medical advice for individual conditions or treatments. Talk to your doctor, nurse or pharmacist before following any medical regimen to see if it is safe and effective for you.    Never

## 2024-05-22 ENCOUNTER — DOCUMENTATION (OUTPATIENT)
Dept: PAIN MEDICINE | Facility: CLINIC | Age: 59
End: 2024-05-22

## 2024-05-29 DIAGNOSIS — K58.1 IRRITABLE BOWEL SYNDROME WITH CONSTIPATION: Chronic | ICD-10-CM

## 2024-05-30 RX ORDER — LINACLOTIDE 145 UG/1
145 CAPSULE, GELATIN COATED ORAL DAILY
Qty: 90 CAPSULE | Refills: 1 | Status: SHIPPED | OUTPATIENT
Start: 2024-05-30

## 2024-07-05 ENCOUNTER — TELEPHONE (OUTPATIENT)
Age: 59
End: 2024-07-05

## 2024-07-11 ENCOUNTER — APPOINTMENT (OUTPATIENT)
Dept: LAB | Facility: HOSPITAL | Age: 59
End: 2024-07-11
Payer: COMMERCIAL

## 2024-07-11 DIAGNOSIS — M19.90 INFLAMMATORY OSTEOARTHRITIS: ICD-10-CM

## 2024-07-11 DIAGNOSIS — E55.9 VITAMIN D DEFICIENCY: ICD-10-CM

## 2024-07-11 DIAGNOSIS — Z79.899 ENCOUNTER FOR LONG-TERM (CURRENT) USE OF OTHER MEDICATIONS: ICD-10-CM

## 2024-07-11 LAB
25(OH)D3 SERPL-MCNC: 69.9 NG/ML (ref 30–100)
ALBUMIN SERPL BCG-MCNC: 4.6 G/DL (ref 3.5–5)
ALP SERPL-CCNC: 77 U/L (ref 34–104)
ALT SERPL W P-5'-P-CCNC: 44 U/L (ref 7–52)
ANION GAP SERPL CALCULATED.3IONS-SCNC: 7 MMOL/L (ref 4–13)
AST SERPL W P-5'-P-CCNC: 31 U/L (ref 13–39)
BASOPHILS # BLD AUTO: 0.04 THOUSANDS/ÂΜL (ref 0–0.1)
BASOPHILS NFR BLD AUTO: 1 % (ref 0–1)
BILIRUB SERPL-MCNC: 0.61 MG/DL (ref 0.2–1)
BILIRUB UR QL STRIP: NEGATIVE
BUN SERPL-MCNC: 12 MG/DL (ref 5–25)
CALCIUM SERPL-MCNC: 10.3 MG/DL (ref 8.4–10.2)
CHLORIDE SERPL-SCNC: 105 MMOL/L (ref 96–108)
CLARITY UR: CLEAR
CO2 SERPL-SCNC: 28 MMOL/L (ref 21–32)
COLOR UR: NORMAL
CREAT SERPL-MCNC: 0.77 MG/DL (ref 0.6–1.3)
CRP SERPL QL: <1 MG/L
EOSINOPHIL # BLD AUTO: 0.05 THOUSAND/ÂΜL (ref 0–0.61)
EOSINOPHIL NFR BLD AUTO: 2 % (ref 0–6)
ERYTHROCYTE [DISTWIDTH] IN BLOOD BY AUTOMATED COUNT: 11.9 % (ref 11.6–15.1)
ERYTHROCYTE [SEDIMENTATION RATE] IN BLOOD: 12 MM/HOUR (ref 0–29)
GFR SERPL CREATININE-BSD FRML MDRD: 84 ML/MIN/1.73SQ M
GLUCOSE P FAST SERPL-MCNC: 85 MG/DL (ref 65–99)
GLUCOSE UR STRIP-MCNC: NEGATIVE MG/DL
HCT VFR BLD AUTO: 40.7 % (ref 34.8–46.1)
HGB BLD-MCNC: 13.6 G/DL (ref 11.5–15.4)
HGB UR QL STRIP.AUTO: NEGATIVE
IMM GRANULOCYTES # BLD AUTO: 0.01 THOUSAND/UL (ref 0–0.2)
IMM GRANULOCYTES NFR BLD AUTO: 0 % (ref 0–2)
KETONES UR STRIP-MCNC: NEGATIVE MG/DL
LEUKOCYTE ESTERASE UR QL STRIP: NEGATIVE
LYMPHOCYTES # BLD AUTO: 1.42 THOUSANDS/ÂΜL (ref 0.6–4.47)
LYMPHOCYTES NFR BLD AUTO: 46 % (ref 14–44)
MCH RBC QN AUTO: 29.8 PG (ref 26.8–34.3)
MCHC RBC AUTO-ENTMCNC: 33.4 G/DL (ref 31.4–37.4)
MCV RBC AUTO: 89 FL (ref 82–98)
MONOCYTES # BLD AUTO: 0.24 THOUSAND/ÂΜL (ref 0.17–1.22)
MONOCYTES NFR BLD AUTO: 8 % (ref 4–12)
NEUTROPHILS # BLD AUTO: 1.32 THOUSANDS/ÂΜL (ref 1.85–7.62)
NEUTS SEG NFR BLD AUTO: 43 % (ref 43–75)
NITRITE UR QL STRIP: NEGATIVE
NRBC BLD AUTO-RTO: 0 /100 WBCS
PH UR STRIP.AUTO: 5 [PH]
PLATELET # BLD AUTO: 280 THOUSANDS/UL (ref 149–390)
PMV BLD AUTO: 10.3 FL (ref 8.9–12.7)
POTASSIUM SERPL-SCNC: 4.1 MMOL/L (ref 3.5–5.3)
PROT SERPL-MCNC: 8.2 G/DL (ref 6.4–8.4)
PROT UR STRIP-MCNC: NEGATIVE MG/DL
RBC # BLD AUTO: 4.56 MILLION/UL (ref 3.81–5.12)
SODIUM SERPL-SCNC: 140 MMOL/L (ref 135–147)
SP GR UR STRIP.AUTO: 1.02 (ref 1–1.03)
UROBILINOGEN UR STRIP-ACNC: <2 MG/DL
WBC # BLD AUTO: 3.08 THOUSAND/UL (ref 4.31–10.16)

## 2024-07-11 PROCEDURE — 85652 RBC SED RATE AUTOMATED: CPT

## 2024-07-11 PROCEDURE — 80053 COMPREHEN METABOLIC PANEL: CPT

## 2024-07-11 PROCEDURE — 36415 COLL VENOUS BLD VENIPUNCTURE: CPT

## 2024-07-11 PROCEDURE — 82306 VITAMIN D 25 HYDROXY: CPT

## 2024-07-11 PROCEDURE — 86140 C-REACTIVE PROTEIN: CPT

## 2024-07-11 PROCEDURE — 85025 COMPLETE CBC W/AUTO DIFF WBC: CPT

## 2024-07-16 ENCOUNTER — OFFICE VISIT (OUTPATIENT)
Age: 59
End: 2024-07-16
Payer: COMMERCIAL

## 2024-07-16 VITALS
BODY MASS INDEX: 25.86 KG/M2 | HEIGHT: 61 IN | TEMPERATURE: 97.9 F | WEIGHT: 137 LBS | HEART RATE: 81 BPM | SYSTOLIC BLOOD PRESSURE: 122 MMHG | OXYGEN SATURATION: 96 % | DIASTOLIC BLOOD PRESSURE: 70 MMHG

## 2024-07-16 DIAGNOSIS — M06.09 SERONEGATIVE ARTHROPATHY OF MULTIPLE SITES (HCC): ICD-10-CM

## 2024-07-16 DIAGNOSIS — M18.0 PRIMARY OSTEOARTHRITIS OF BOTH FIRST CARPOMETACARPAL JOINTS: ICD-10-CM

## 2024-07-16 DIAGNOSIS — M15.0 PRIMARY GENERALIZED (OSTEO)ARTHRITIS: ICD-10-CM

## 2024-07-16 DIAGNOSIS — M19.90 INFLAMMATORY OSTEOARTHRITIS: Primary | ICD-10-CM

## 2024-07-16 DIAGNOSIS — M15.4 EROSIVE OSTEOARTHRITIS: ICD-10-CM

## 2024-07-16 DIAGNOSIS — M18.11 PRIMARY OSTEOARTHRITIS OF FIRST CARPOMETACARPAL JOINT OF RIGHT HAND: ICD-10-CM

## 2024-07-16 DIAGNOSIS — M96.1 POST LAMINECTOMY SYNDROME: ICD-10-CM

## 2024-07-16 DIAGNOSIS — Z79.899 ENCOUNTER FOR LONG-TERM (CURRENT) USE OF OTHER MEDICATIONS: ICD-10-CM

## 2024-07-16 DIAGNOSIS — D72.819 LEUKOPENIA, UNSPECIFIED TYPE: ICD-10-CM

## 2024-07-16 DIAGNOSIS — M48.07 SPINAL STENOSIS OF LUMBOSACRAL REGION: ICD-10-CM

## 2024-07-16 PROCEDURE — 20600 DRAIN/INJ JOINT/BURSA W/O US: CPT | Performed by: INTERNAL MEDICINE

## 2024-07-16 PROCEDURE — 99214 OFFICE O/P EST MOD 30 MIN: CPT | Performed by: INTERNAL MEDICINE

## 2024-07-16 RX ADMIN — METHYLPREDNISOLONE ACETATE 1 ML: 40 INJECTION, SUSPENSION INTRA-ARTICULAR; INTRALESIONAL; INTRAMUSCULAR; SOFT TISSUE at 08:00

## 2024-07-16 NOTE — PATIENT INSTRUCTIONS
Ice the right thumb near the injection.  Do the gentle massage therapy demonstrated for the right middle finger tendinitis.  It may help with the locking.  If it gets significant with regard to locking, call the office and come in for an injection.  Continue the Plaquenil.  Follow-up with the eye doctor yearly.  Get lab work 2 weeks before your next visit.  I am giving you a kit to check your Plaquenil level.

## 2024-07-16 NOTE — PROGRESS NOTES
Assessment/Plan:    Primary generalized (osteo)arthritis  Primary generalized osteoarthritis with interphalangeal osteoarthritis with overall improvement on Plaquenil which she started in October 2023.  She does note less swelling in her hands as well as morning stiffness, but does note more symptoms at night after using her hands all day.  She was intolerant to sulfasalazine.  She does have increased symptoms right first CMC joint and is requesting injection.  She also notes right middle finger locking sensation.  Continue Plaquenil as ordered.  Will give Depo steroid injection right first CMC joint.  I have instructed her on gentle massage of the right middle finger flexor tendon sheath to help break up scar tissue.  If symptoms do worsen, she can call and come in for an injection along the flexor tendon sheath.  Follow-up with eye doctor every 6 to 12 months for Plaquenil screening.  Will plan to check her Plaquenil level with lab work ordered for the next office visit.  Monitor disease activity and medication toxicity with lab work as ordered.  Follow-up 6 months or earlier if needed.    Primary osteoarthritis of first carpometacarpal joint of right hand  Flare of increased pain right first CMC joint with exquisite tenderness on exam.  She does have osteoarthritis first CMC joints bilaterally.  Patient requests steroid injection.  See procedure note.  She was told to ice the area several times daily over the next 4 days.  Continue to monitor.    Seronegative arthropathy of multiple sites (HCC)  Seronegative arthropathy bilateral hands with first CMC osteoarthritis and intermittent joint swelling.  Symptoms consistent with inflammatory osteoarthritis versus erosive osteoarthritis.  Musculoskeletal ultrasound from April 2023 revealed no evidence for inflammatory arthritis.  She did have degenerative changes with osteophytes in the DIP joints of the right hand.  Patient did try sulfasalazine but discontinued  because of GI upset.  She started Plaquenil in October of last year, and has noted some benefit.  Will continue to monitor patient clinically and with lab work as ordered.  Follow-up with eye doctor on a regular basis to rule out Plaquenil toxicity.    Erosive osteoarthritis  Clinical presentation consistent with inflammatory osteoarthritis with musculoskeletal ultrasound negative for inflammation.  She has no erosions, however, clinically her course is consistent with inflammatory osteoarthritis.  She has had some benefit with Plaquenil which she started October 2023.  She will continue with this.  Monitor for eye toxicity with follow-up ophthalmology visits every 6 to 12 months.  Monitor disease activity and medication toxicity with lab work as ordered.  Follow-up 6 months or sooner if needed.    Leukopenia  Chronic leukopenia with white blood cell count 3.08 and absolute neutrophil count 1.32 on lab work from July 11, 2024.  No history of recurrent fevers or recurrent infections.  Follow-up with hematology.  Continue to monitor.    Post laminectomy syndrome  History of postlaminectomy syndrome status post decompression and revision with chronic neuropathic symptoms.  EMG studies bilateral lower extremities from March 2023 significant for no evidence of neuropathy or radiculopathy.  Suspect a component of small fiber neuropathy.  She continues on gabapentin as before.  Follow-up pain management.  Monitor labs for medication toxicity.    Spinal stenosis of lumbosacral region  Post laminectomy syndrome.  Continue follow-up with pain management.         Problem List Items Addressed This Visit       Spinal stenosis of lumbosacral region     Post laminectomy syndrome.  Continue follow-up with pain management.         Primary generalized (osteo)arthritis     Primary generalized osteoarthritis with interphalangeal osteoarthritis with overall improvement on Plaquenil which she started in October 2023.  She does note less  swelling in her hands as well as morning stiffness, but does note more symptoms at night after using her hands all day.  She was intolerant to sulfasalazine.  She does have increased symptoms right first CMC joint and is requesting injection.  She also notes right middle finger locking sensation.  Continue Plaquenil as ordered.  Will give Depo steroid injection right first CMC joint.  I have instructed her on gentle massage of the right middle finger flexor tendon sheath to help break up scar tissue.  If symptoms do worsen, she can call and come in for an injection along the flexor tendon sheath.  Follow-up with eye doctor every 6 to 12 months for Plaquenil screening.  Will plan to check her Plaquenil level with lab work ordered for the next office visit.  Monitor disease activity and medication toxicity with lab work as ordered.  Follow-up 6 months or earlier if needed.         Primary osteoarthritis of both first carpometacarpal joints    Primary osteoarthritis of first carpometacarpal joint of right hand     Flare of increased pain right first CMC joint with exquisite tenderness on exam.  She does have osteoarthritis first CMC joints bilaterally.  Patient requests steroid injection.  See procedure note.  She was told to ice the area several times daily over the next 4 days.  Continue to monitor.         Relevant Orders    Small joint arthrocentesis: R thumb CMC    Inflammatory osteoarthritis - Primary    Relevant Orders    C-reactive protein    Sedimentation rate, automated    CBC and differential    Comprehensive metabolic panel    Urinalysis with microscopic    MISCELLANEOUS LAB TEST    C-reactive protein    Sedimentation rate, automated    CBC and differential    Comprehensive metabolic panel    Urinalysis with microscopic    Erosive osteoarthritis     Clinical presentation consistent with inflammatory osteoarthritis with musculoskeletal ultrasound negative for inflammation.  She has no erosions, however,  clinically her course is consistent with inflammatory osteoarthritis.  She has had some benefit with Plaquenil which she started October 2023.  She will continue with this.  Monitor for eye toxicity with follow-up ophthalmology visits every 6 to 12 months.  Monitor disease activity and medication toxicity with lab work as ordered.  Follow-up 6 months or sooner if needed.         Relevant Orders    MISCELLANEOUS LAB TEST    C-reactive protein    Sedimentation rate, automated    CBC and differential    Comprehensive metabolic panel    Urinalysis with microscopic    Seronegative arthropathy of multiple sites (HCC)     Seronegative arthropathy bilateral hands with first CMC osteoarthritis and intermittent joint swelling.  Symptoms consistent with inflammatory osteoarthritis versus erosive osteoarthritis.  Musculoskeletal ultrasound from April 2023 revealed no evidence for inflammatory arthritis.  She did have degenerative changes with osteophytes in the DIP joints of the right hand.  Patient did try sulfasalazine but discontinued because of GI upset.  She started Plaquenil in October of last year, and has noted some benefit.  Will continue to monitor patient clinically and with lab work as ordered.  Follow-up with eye doctor on a regular basis to rule out Plaquenil toxicity.         Post laminectomy syndrome     History of postlaminectomy syndrome status post decompression and revision with chronic neuropathic symptoms.  EMG studies bilateral lower extremities from March 2023 significant for no evidence of neuropathy or radiculopathy.  Suspect a component of small fiber neuropathy.  She continues on gabapentin as before.  Follow-up pain management.  Monitor labs for medication toxicity.         Leukopenia     Chronic leukopenia with white blood cell count 3.08 and absolute neutrophil count 1.32 on lab work from July 11, 2024.  No history of recurrent fevers or recurrent infections.  Follow-up with hematology.  Continue  to monitor.         Relevant Orders    CBC and differential     Other Visit Diagnoses       Encounter for long-term (current) use of other medications        Relevant Orders    MISCELLANEOUS LAB TEST                Reviewed records, labs, and imaging with the patient in detail.  Counseled patient.  Discussion regarding my findings and recommendations.  Office visit with documentation 35 min.    Subjective:      Patient ID: Fidelia Morales is a 59 y.o. female.    Patient presents for follow-up of her osteoarthritis.  She has a history of 1st CMC osteoarthritis and had injections done in the past.  She has had persistent pain and discomfort in her hands and fingers with occasional soft tissue swelling.  She had a musculoskeletal ultrasound done in April 2023 of her bilateral hands and wrists which revealed no evidence for inflammatory arthritis.  She did have degenerative changes with osteophytes in the DIP joints of her right hand.  In February 2023 sulfasalazine was added for inflammatory osteoarthritis symptoms.  This caused GI upset and was discontinued.  She started Plaquenil in October 2023.  Overall she has noticed an improvement in her joint pain with the addition of Plaquenil.  She has less swelling in her hands.  Her hand stiffness is generally worse at night after using her hands all day.  She does note that she is sensitive to weather changes with rain and has an increase in joint pain and stiffness due to this.  She also takes meloxicam as needed for joint pain.  More recently she has been having an increase in right first CMC joint pain.  She is requesting a steroid injection right first CMC joint.  She also has pain in the right middle finger with locking sensation.    She has a history of lumbar degenerative disc disease status post decompression and revision with chronic neuropathic symptoms.  She follows with Pain Management.  She has a history of chronic pain with likely small fiber neuropathy.   She has been using a compounded cream prescribed by pain management with benefit.  She follows with hematology for history of neutropenia.    Lab work dated 7/11/2024 significant for vitamin D 69.9.  White blood cell count 3.08 with absolute neutrophil count 1.32 stable.  Sed rate 12.  CRP less than 1.0.  Calcium 10.3 with corrected calcium 9.8 normal.  Estimated GFR 84.  Creatinine 0.77.        Small joint arthrocentesis: R thumb CMC  [place] Protocol:  Consent: Verbal consent obtained.  Risks and benefits: risks, benefits and alternatives were discussed  Consent given by: patient  Timeout called at: 7/16/2024 9:29 AM.  Patient understanding: patient states understanding of the procedure being performed  Patient consent: the patient's understanding of the procedure matches consent given  Procedure consent: procedure consent matches procedure scheduled  Relevant documents: relevant documents present and verified  Test results: test results available and properly labeled  Site marked: the operative site was marked  Radiology Images displayed and confirmed. If images not available, report reviewed: imaging studies available  Patient identity confirmed: verbally with patient  Supporting Documentation  Indications: joint swelling and pain   Procedure Details  Location: thumb - R thumb CMC  Preparation: Patient was prepped and draped in the usual sterile fashion  Needle size: 22 G  Ultrasound guidance: no  Approach: dorsal  Medications administered: 1 mL methylPREDNISolone acetate 40 mg/mL    Patient tolerance: patient tolerated the procedure well with no immediate complications  Dressing:  Sterile dressing applied     Told to ice 4 times a day over the next 4 days.           Allergies  Allergies   Allergen Reactions    Other Sneezing and Nasal Congestion     SEASONAL ALLERGIES       Home Medications    Current Outpatient Medications:     acetaminophen (TYLENOL) 325 mg tablet, Take 500 mg by mouth as needed for mild  pain, Disp: , Rfl:     Ascorbic Acid (Vitamin C ER) 1000 MG TBCR, Take 1,000 mg by mouth, Disp: , Rfl:     Cholecalciferol 50 MCG (2000 UT) TABS, Take 2,000 Units by mouth, Disp: , Rfl:     cyclobenzaprine (FLEXERIL) 5 mg tablet, Take 1 tablet (5 mg total) by mouth 3 (three) times a day as needed for muscle spasms, Disp: 90 tablet, Rfl: 0    gabapentin (NEURONTIN) 800 mg tablet, Take 1 tablet (800 mg total) by mouth 2 (two) times a day AND 1.5 tablets (1,200 mg total) daily at bedtime., Disp: 315 tablet, Rfl: 1    hydroxychloroquine (PLAQUENIL) 200 mg tablet, Take 1 tablet (200 mg total) by mouth 2 (two) times a day with meals, Disp: 180 tablet, Rfl: 0    linaCLOtide (Linzess) 145 MCG CAPS, Take 1 capsule (145 mcg total) by mouth daily, Disp: 90 capsule, Rfl: 1    meloxicam (MOBIC) 7.5 mg tablet, Take 1 tablet (7.5 mg total) by mouth daily as needed for moderate pain, Disp: 90 tablet, Rfl: 1    methylPREDNISolone 4 MG tablet therapy pack, Use as directed on package, Disp: 21 each, Rfl: 0    minoxidil (LONITEN) 2.5 mg tablet, TAKE ONE-HALF (1/2) TABLET DAILY, Disp: 45 tablet, Rfl: 3    mometasone (NASONEX) 50 mcg/act nasal spray, USE 2 SPRAYS IN EACH NOSTRIL DAILY, Disp: 51 g, Rfl: 3    multivitamin (THERAGRAN) TABS, Take 1 tablet by mouth daily, Disp: , Rfl:     Past Medical History  Past Medical History:   Diagnosis Date    Abdominal pain     Arthralgia of pelvic region and thigh     Chest pain     CTS (carpal tunnel syndrome)     Fibromyalgia     Hepatitis     Leukocytopenia     Leukorrhea     Temporomandibular joint sounds on opening and/or closing the jaw        Past Surgical History   Past Surgical History:   Procedure Laterality Date    BACK SURGERY      CHOLECYSTECTOMY      FOOT SURGERY Right     INCONTINENCE SURGERY      vaginal sling for stress incontinence    IR BIOPSY BONE MARROW  11/29/2022    LUMBAR FUSION  03/06/2019    [facility]    SINUS SURGERY      SPINAL FUSION  3/5/2019    SPINE SURGERY  03/05/2019  "      Family History   Family History   Problem Relation Age of Onset    Alzheimer's disease Father     Asthma Father     Heart attack Father     COPD Father     Hypertension Father     Alzheimer's disease Maternal Grandmother     Lymphoma Maternal Aunt     Emphysema Maternal Grandfather        The following portions of the patient's history were reviewed and updated as appropriate: allergies, current medications, past family history, past medical history, past social history, past surgical history, and problem list.    Review of Systems   Constitutional:  Negative for chills, fatigue and fever.   HENT:  Negative for hearing loss, sore throat and tinnitus.    Eyes:  Negative for pain and visual disturbance.   Respiratory:  Negative for cough and shortness of breath.    Cardiovascular:  Negative for chest pain and palpitations.   Gastrointestinal:  Negative for abdominal pain, nausea and vomiting.   Genitourinary:  Negative for difficulty urinating.   Musculoskeletal:  Positive for arthralgias and back pain. Negative for gait problem, joint swelling, myalgias, neck pain and neck stiffness.   Skin:  Negative for rash.   Neurological:  Positive for numbness. Negative for dizziness, seizures, weakness and headaches.   Psychiatric/Behavioral:  Negative for confusion, decreased concentration and sleep disturbance.          Objective:      /70 (BP Location: Right arm, Patient Position: Sitting, Cuff Size: Adult)   Pulse 81   Temp 97.9 °F (36.6 °C) (Temporal)   Ht 5' 1\" (1.549 m)   Wt 62.1 kg (137 lb)   SpO2 96%   BMI 25.89 kg/m²          Physical Exam  Vitals reviewed.   Constitutional:       Appearance: Normal appearance.   HENT:      Head: Normocephalic.      Nose:      Comments: Nose and throat unremarkable.  Eyes:      Extraocular Movements: Extraocular movements intact.   Neck:      Comments: Without masses, thyromegaly, lymphadenopathy  Cardiovascular:      Rate and Rhythm: Normal rate and regular rhythm. "   Pulmonary:      Breath sounds: Normal breath sounds.   Abdominal:      Palpations: Abdomen is soft.   Musculoskeletal:      Cervical back: Neck supple.      Comments: Decreased lateral flexion neck with triggers posterior cervical and shoulder girdle muscles.  Full range of motion bilateral shoulders, elbows, wrists.  Triggers lateral and medial epicondyles bilaterally.  Interphalangeal OA changes, squaring 1st CMC joints, Heberden's nodes, Masoud's nodes bilateral hands.  Exquisite tenderness right first carpometacarpal joint to palpation.  Tenderness left basal thumb joint as well as right middle finger PIP and DIP joints.  No synovitis noted.  Thickening right middle finger flexor tendon sheath with no triggering noted.  Straight leg raising negative bilaterally.  Marked spasm noted lumbosacral paraspinals.  Full range of motion bilateral hips, knees, ankles.  Patellofemoral crepitus noted bilateral knees.  Rearfoot hyperpronation, hammertoe deformities, midfoot cavus deformities bilateral feet.   Skin:     General: Skin is warm and dry.   Neurological:      General: No focal deficit present.      Mental Status: She is alert.               This note was written in part using the assistance of the [employer] [id number] microphone system. Those portions using this system have been dictated and not read.

## 2024-07-31 RX ORDER — METHYLPREDNISOLONE ACETATE 40 MG/ML
1 INJECTION, SUSPENSION INTRA-ARTICULAR; INTRALESIONAL; INTRAMUSCULAR; SOFT TISSUE
Status: COMPLETED | OUTPATIENT
Start: 2024-07-16 | End: 2024-07-16

## 2024-07-31 NOTE — ASSESSMENT & PLAN NOTE
Flare of increased pain right first CMC joint with exquisite tenderness on exam.  She does have osteoarthritis first CMC joints bilaterally.  Patient requests steroid injection.  See procedure note.  She was told to ice the area several times daily over the next 4 days.  Continue to monitor.

## 2024-07-31 NOTE — ASSESSMENT & PLAN NOTE
Chronic leukopenia with white blood cell count 3.08 and absolute neutrophil count 1.32 on lab work from July 11, 2024.  No history of recurrent fevers or recurrent infections.  Follow-up with hematology.  Continue to monitor.

## 2024-07-31 NOTE — ASSESSMENT & PLAN NOTE
Seronegative arthropathy bilateral hands with first CMC osteoarthritis and intermittent joint swelling.  Symptoms consistent with inflammatory osteoarthritis versus erosive osteoarthritis.  Musculoskeletal ultrasound from April 2023 revealed no evidence for inflammatory arthritis.  She did have degenerative changes with osteophytes in the DIP joints of the right hand.  Patient did try sulfasalazine but discontinued because of GI upset.  She started Plaquenil in October of last year, and has noted some benefit.  Will continue to monitor patient clinically and with lab work as ordered.  Follow-up with eye doctor on a regular basis to rule out Plaquenil toxicity.   No

## 2024-07-31 NOTE — ASSESSMENT & PLAN NOTE
Clinical presentation consistent with inflammatory osteoarthritis with musculoskeletal ultrasound negative for inflammation.  She has no erosions, however, clinically her course is consistent with inflammatory osteoarthritis.  She has had some benefit with Plaquenil which she started October 2023.  She will continue with this.  Monitor for eye toxicity with follow-up ophthalmology visits every 6 to 12 months.  Monitor disease activity and medication toxicity with lab work as ordered.  Follow-up 6 months or sooner if needed.

## 2024-07-31 NOTE — ASSESSMENT & PLAN NOTE
History of postlaminectomy syndrome status post decompression and revision with chronic neuropathic symptoms.  EMG studies bilateral lower extremities from March 2023 significant for no evidence of neuropathy or radiculopathy.  Suspect a component of small fiber neuropathy.  She continues on gabapentin as before.  Follow-up pain management.  Monitor labs for medication toxicity.

## 2024-07-31 NOTE — ASSESSMENT & PLAN NOTE
Primary generalized osteoarthritis with interphalangeal osteoarthritis with overall improvement on Plaquenil which she started in October 2023.  She does note less swelling in her hands as well as morning stiffness, but does note more symptoms at night after using her hands all day.  She was intolerant to sulfasalazine.  She does have increased symptoms right first CMC joint and is requesting injection.  She also notes right middle finger locking sensation.  Continue Plaquenil as ordered.  Will give Depo steroid injection right first CMC joint.  I have instructed her on gentle massage of the right middle finger flexor tendon sheath to help break up scar tissue.  If symptoms do worsen, she can call and come in for an injection along the flexor tendon sheath.  Follow-up with eye doctor every 6 to 12 months for Plaquenil screening.  Will plan to check her Plaquenil level with lab work ordered for the next office visit.  Monitor disease activity and medication toxicity with lab work as ordered.  Follow-up 6 months or earlier if needed.

## 2024-08-14 DIAGNOSIS — R09.82 POST-NASAL DRIP: ICD-10-CM

## 2024-08-14 RX ORDER — MOMETASONE FUROATE MONOHYDRATE 50 UG/1
SPRAY, METERED NASAL
Qty: 51 G | Refills: 3 | Status: SHIPPED | OUTPATIENT
Start: 2024-08-14

## 2024-09-10 ENCOUNTER — HOSPITAL ENCOUNTER (OUTPATIENT)
Dept: CT IMAGING | Facility: HOSPITAL | Age: 59
Discharge: HOME | End: 2024-09-10
Attending: INTERNAL MEDICINE
Payer: COMMERCIAL

## 2024-09-10 DIAGNOSIS — E78.00 BORDERLINE HYPERCHOLESTEROLEMIA: Chronic | ICD-10-CM

## 2024-09-10 PROCEDURE — 75571 CT HRT W/O DYE W/CA TEST: CPT

## 2024-09-10 PROCEDURE — G1004 CDSM NDSC: HCPCS

## 2024-09-10 NOTE — PROGRESS NOTES
Assessment    1  Screening for thyroid disorder (V77 0) (Z13 29)   2  Abdominal pain, suprapubic (789 09) (R10 2)    Plan  Abdominal pain, suprapubic    · (1) URINALYSIS w URINE C/S REFLEX (will reflex a microscopy if leukocytes, occult  blood, or nitrites are not within normal limits); Status:Active; Requested for:75Hjm1136;   Fatigue    · (1) CBC/PLT/DIFF; Status:Active; Requested for:83Ctn1532; Health Maintenance    · COLONOSCOPY ; every 10 years; Last 24OYO9374; Next 59VIG0150; Status:Active  Irritable bowel syndrome    · Linzess 145 MCG Oral Capsule; 1 qd  Screening for thyroid disorder    · (1) TSH; Status:Active; Requested for:16Iud2262; Unlinked    · (1) COMPREHENSIVE METABOLIC PANEL; Status:Active; Requested for:86Lqh0642;    · (1) LIPID PANEL, FASTING; Status:Active; Requested for:56Xic2377;     Discussion/Summary  health maintenance visit Currently, she eats an adequate diet and has an inadequate exercise regimen  cervical cancer screening is current Breast cancer screening: mammogram is current  Colorectal cancer screening: colorectal cancer screening is current  Screening lab work includes hemoglobin, glucose, lipid profile, thyroid function testing and urinalysis  Advice and education were given regarding nutrition, aerobic exercise and weight bearing exercise  Patient discussion: discussed with the patient  ROUTINE CHECKUP- PT DOING WELL OVERALL  BW ORDERED  DEPRESSION SCREENING NEGATIVE    FLU VACCINE IN 9/2016 AT [email or web address]    LAST MAMMOGRAM 8/2016    LAST PAP 7/2016    LAST COLONOSCOPE 2014, DUE 2024       Chief Complaint  ROUTINE CHECKUP      History of Present Illness  HM, Adult Female: The patient is being seen for a health maintenance evaluation  The last health maintenance visit was 1 5 year(s) ago  Social History: Household members include spouse  She is   The patient has never smoked cigarettes  She reports rare alcohol use   The patient has no concerns about alcohol abuse  She has never used illicit drugs  General Health: The patient's health since the last visit is described as good  Lifestyle:  She consumes a diverse and healthy diet  She has weight concerns  She does not exercise regularly  She does not use tobacco  She denies alcohol use  She denies drug use  Reproductive health: the patient is premenopausal   she reports normal menses  Screening:       HPI: PT HERE FOR ROUTINE CHECKUP  NO BW OR CHECKUP IN ABOUT 1 5 YRS  SHE FEELS WELL, OVERALL  HAS A COMPLAINT OF LEFT HIP AND GROIN PAIN  LEFT HIP PAIN IS CHRONIC  SHE'S SEEN DR CATHY LASSITER FOR THIS AND HAD SOME JOINT INJECTIONS  GROIN PAIN IS NEW  NOT PRESENT NOW  WORSE WITH STANDING ON ONE FOOT  SOME LOWER ABD, SUPRAPUBIC PRESSURE  NO DYSURIA, URINARY FREQ, ARKS COLOR OR STRONG ODOR TO URINE  NO F/C/S  NO RASH    SHE IS SEEING DR PENA NEXT WEEK FOR HIP PAIN  NO NUMBNESS OR TINGLING  LMP- TODAY, 2/23/17          Abdominal Pain (Brief): The patient is being seen for abdominal pain   Symptoms: abdominal pain and vaginal bleeding, but no nausea, no vomiting, no diarrhea, no anorexia, no dysuria, no hematuria, no dark urine, no vaginal discharge, no missed menstrual period and no confirmed pregnancy  Associated Symptoms: abdominal bloating, but no fever, no chills, no lightheadedness, no jaundice, no hematemesis, no melena and no bloody stools  Review of Systems    Constitutional: No fever, no chills, feels well, no tiredness, no recent weight gain or weight loss  Eyes: No complaints of eye pain, no red eyes, no eyesight problems, no discharge, no dry eyes, no itching of eyes  ENT: no complaints of earache, no loss of hearing, no nose bleeds, no nasal discharge, no sore throat, no hoarseness  Cardiovascular: No complaints of slow heart rate, no fast heart rate, no chest pain, no palpitations, no leg claudication, no lower extremity edema     Respiratory: No complaints of shortness of breath, no wheezing, no cough, no SOB on exertion, no orthopnea, no PND  Gastrointestinal: abdominal pain, constipation and IBS, SUPRAPUBIC PAIN, but no nausea, no vomiting, no diarrhea and no blood in stools  Genitourinary: No complaints of dysuria, no incontinence, no pelvic pain, no dysmenorrhea, no vaginal discharge or bleeding  Musculoskeletal: as noted in HPI  Integumentary: No complaints of skin rash or lesions, no itching, no skin wounds, no breast pain or lump  Over the past 2 weeks, how often have you been bothered by the following problems? 1 ) Little interest or pleasure in doing things? Not at all    2 ) Feeling down, depressed or hopeless? Not at all    3 ) Trouble falling asleep or sleeping too much? Not at all    4 ) Feeling tired or having little energy? Not at all    5 ) Poor appetite or overeating? Not at all    6 ) Feeling bad about yourself, or that you are a failure, or have let yourself or your family down? Not at all    7 ) Trouble concentrating on things, such as reading a newspaper or watching television? Not at all    8 ) Moving or speaking so slowly that other people could have noticed, or the opposite, moving or speaking faster than usual? Not at all  How difficult have these problems made it for you to do your work, take care of things at home, or get along with people? Not at all  Score 0     ROS reviewed  Active Problems    1  Abdominal pain (789 00) (R10 9)   2  Abdominal pain, suprapubic (789 09) (R10 2)   3  Abnormal blood chemistry (790 6) (R79 9)   4  Abnormal CT scan, gastrointestinal tract (793 4) (R93 3)   5  Abnormal vaginal bleeding (623 8) (N93 9)   6  Acute maxillary sinusitis, recurrence not specified (461 0) (J01 00)   7  Allergic rhinitis due to pollen (477 0) (J30 1)   8  Change in bowel habits (787 99) (R19 4)   9  Chest wall tenderness (786 52) (R07 89)   10  Extrinsic asthma (493 00) (J45 909)   11  Fatigue (780 79) (R53 83)   12   Gastroesophageal reflux disease (530 81) (K21 9)   13  Irritable bowel syndrome (564 1) (K58 9)   14  Parotid mass (784 2) (R22 0)   15  Posterior cervical lymphadenopathy (785 6) (R59 0)   16  Rash, skin (782 1) (R21)   17  Right lower quadrant pain (789 03) (R10 31)   18  Screening for thyroid disorder (V77 0) (Z13 29)   19  Sialoadenitis (527 2) (K11 20)   20  Thumb pain, left (729 5) (M79 645)   21  Tick bites (919 4,E906 4) (W57 XXXA)    Past Medical History    · History of Acute upper respiratory infection of multiple sites (465 8) (J06 9)   · History of Arthralgia of pelvic region and thigh (719 45) (M25 559)   · History of Cramp of limb (729 82) (R25 2)   · History of abdominal pain (V13 89) (E27 353)   · History of carpal tunnel syndrome (V12 49) (Z86 69)   · History of chest pain (V13 89) (E59 801)   · History of hepatitis (V12 09) (Z86 19)   · History of Leukocytopenia (288 50) (D72 819)   · History of Leukorrhea (623 5) (N89 8)   · Personal history of fibromyalgia (V13 59) (Z87 39)   · History of Temporomandibular joint sounds on opening and/or closing the jaw (524 64)  (M26 69)    Surgical History    · History of Cholecystectomy   · History of Foot Surgery Right   · History of Sinus Surgery   · History of Vaginal Sling Operation For Stress Incontinence    Family History  Father    · Family history of Alzheimer's disease (V17 2) (Z82 0)   · Family history of asthma (V17 5) (Z82 5)   · Family history of diabetes mellitus (V18 0) (Z83 3)   · Family history of emphysema (V17 6) (Z82 5)   · Family history of myocardial infarction (V17 3) (Z82 49)  Maternal Grandmother    · Family history of Alzheimer's disease (V17 2) (Z82 0)  Paternal Grandmother    · Family history of emphysema (V17 6) (Z82 5)  Maternal Aunt    · Family history of lymphoma (V16 7) (Z80 2)    Social History    · Housewife or homemaker   ·    · Never smoker   · No alcohol use   · No drug use    Current Meds   1  Acidophilus Oral Tablet;    Therapy: 24YEA6248 to Recorded   2  Nasonex 50 MCG/ACT Nasal Suspension; USE 2 SPRAYS IN EACH NOSTRIL ONCE   DAILY; Therapy: 79CDQ0013 to (Last Ge Arambula)  Requested for: 75Xdq6038 Ordered   3  ProAir  (90 Base) MCG/ACT Inhalation Aerosol Solution; INHALE 2 PUFFS   EVERY 4 HOURS AS NEEDED; Therapy: 76PMR7923 to Recorded   4  Tylenol 325 MG Oral Tablet; TAKE 1 TABLET Every morning PRN; Therapy: 12RQM6596 to Recorded    Allergies    1  No Known Drug Allergies    Vitals   Recorded: 20Ztj5998 01:13PM   Temperature 98 1 F   Heart Rate 88   Systolic 936   Diastolic 86   Height 5 ft    Weight 158 lb 8 0 oz   BMI Calculated 30 95   BSA Calculated 1 68   O2 Saturation 97     Physical Exam    Constitutional   General appearance: No acute distress, well appearing and well nourished  Eyes   Conjunctiva and lids: No swelling, erythema or discharge  Pupils and irises: Equal, round, reactive to light  Ears, Nose, Mouth, and Throat   External inspection of ears and nose: Normal     Otoscopic examination: Tympanic membranes translucent with normal light reflex  Canals patent without erythema  Nasal mucosa, septum, and turbinates: Normal without edema or erythema  Oropharynx: Normal with no erythema, edema, exudate or lesions  Neck   Neck: Supple, symmetric, trachea midline, no masses  Pulmonary   Respiratory effort: No increased work of breathing or signs of respiratory distress  Auscultation of lungs: Clear to auscultation  Cardiovascular   Auscultation of heart: Normal rate and rhythm, normal S1 and S2, no murmurs  Pedal pulses: 2+ bilaterally  Examination of extremities for edema and/or varicosities: Normal     Abdomen   Abdomen: Non-tender, no masses  Liver and spleen: No hepatomegaly or splenomegaly  Lymphatic   Palpation of lymph nodes in neck: No lymphadenopathy      Musculoskeletal   Gait and station: Normal     Joints, bones, and muscles: Normal     Skin   Skin and subcutaneous tissue: Normal without rashes or lesions  Palpation of skin and subcutaneous tissue: Normal turgor  Neurologic   Cranial nerves: Cranial nerves II-XII intact  Psychiatric   Judgment and insight: Normal     Orientation to person, place, and time: Normal     Mood and affect: Normal        Results/Data  Health Maintenance Flow Sheet 88NEO3647 03:18PM      Test Name Result Flag Reference   Pap      faxed to [facility] obgyn dr [facility]  Cassia Tate     PHQ-2 Adult Depression Screening 36TPN0987 01:22PM User, [facility]     Test Name Result Flag Reference   PHQ-2 Adult Depression Score 0     Over the last two weeks, how often have you been bothered by any of the following problems? Little interest or pleasure in doing things: Not at all - 0  Feeling down, depressed, or hopeless: Not at all - 0   PHQ-2 Adult Depression Screening Negative         Health Management  Health Maintenance   COLONOSCOPY; every 10 years; Last 03JTD7693; Next Due: 79VQE0843; Active  Fluzone Intramuscular Injectable; every 1 year; Last 36HJJ1611; Next Due: 56Hxx8112; Overdue  Tdap (Adacel); every 10 years; Last 18ADF4614; Next Due: 90ENO3605;  Active    Signatures   Electronically signed by : Josie La [facility]; Feb 23 2017  2:03PM EST                       (Author)    Electronically signed by : Yesy Dumont DO; Feb 23 2017  3:39PM EST                       (Co-author) Patient with no past medical history presents for left ankle pain to play basketball    Independent interpretation of the Xray film(s) performed by: Angel Robertson  Fracture of left digit    Tenderness in the left PIP fourth digit, decreased range of motion due to pain    Appropriate medications for patient's presenting complaints were ordered and effects were reassessed.   Patient's external records were reviewed.     Escalation to admission and/or observation was considered.  Patient feels much better and is comfortable with discharge.  Appropriate follow-up was arranged.

## 2024-09-19 ENCOUNTER — APPOINTMENT (OUTPATIENT)
Dept: LAB | Facility: HOSPITAL | Age: 59
End: 2024-09-19
Payer: COMMERCIAL

## 2024-09-19 DIAGNOSIS — D70.9 NEUTROPENIA, UNSPECIFIED TYPE (HCC): ICD-10-CM

## 2024-09-19 DIAGNOSIS — E78.00 HYPERCHOLESTEREMIA: ICD-10-CM

## 2024-09-19 LAB
BASOPHILS # BLD AUTO: 0.03 THOUSANDS/ΜL (ref 0–0.1)
BASOPHILS NFR BLD AUTO: 1 % (ref 0–1)
CHOLEST SERPL-MCNC: 238 MG/DL
EOSINOPHIL # BLD AUTO: 0.07 THOUSAND/ΜL (ref 0–0.61)
EOSINOPHIL NFR BLD AUTO: 2 % (ref 0–6)
ERYTHROCYTE [DISTWIDTH] IN BLOOD BY AUTOMATED COUNT: 12.1 % (ref 11.6–15.1)
HCT VFR BLD AUTO: 37.9 % (ref 34.8–46.1)
HDLC SERPL-MCNC: 76 MG/DL
HGB BLD-MCNC: 12.2 G/DL (ref 11.5–15.4)
IMM GRANULOCYTES # BLD AUTO: 0.01 THOUSAND/UL (ref 0–0.2)
IMM GRANULOCYTES NFR BLD AUTO: 0 % (ref 0–2)
LDLC SERPL CALC-MCNC: 141 MG/DL (ref 0–100)
LYMPHOCYTES # BLD AUTO: 1.34 THOUSANDS/ΜL (ref 0.6–4.47)
LYMPHOCYTES NFR BLD AUTO: 42 % (ref 14–44)
MCH RBC QN AUTO: 29.4 PG (ref 26.8–34.3)
MCHC RBC AUTO-ENTMCNC: 32.2 G/DL (ref 31.4–37.4)
MCV RBC AUTO: 91 FL (ref 82–98)
MONOCYTES # BLD AUTO: 0.26 THOUSAND/ΜL (ref 0.17–1.22)
MONOCYTES NFR BLD AUTO: 8 % (ref 4–12)
NEUTROPHILS # BLD AUTO: 1.52 THOUSANDS/ΜL (ref 1.85–7.62)
NEUTS SEG NFR BLD AUTO: 47 % (ref 43–75)
NRBC BLD AUTO-RTO: 0 /100 WBCS
PLATELET # BLD AUTO: 259 THOUSANDS/UL (ref 149–390)
PMV BLD AUTO: 10 FL (ref 8.9–12.7)
RBC # BLD AUTO: 4.15 MILLION/UL (ref 3.81–5.12)
TRIGL SERPL-MCNC: 105 MG/DL
WBC # BLD AUTO: 3.23 THOUSAND/UL (ref 4.31–10.16)

## 2024-09-19 PROCEDURE — 83695 ASSAY OF LIPOPROTEIN(A): CPT

## 2024-09-19 PROCEDURE — 83521 IG LIGHT CHAINS FREE EACH: CPT

## 2024-09-19 PROCEDURE — 82172 ASSAY OF APOLIPOPROTEIN: CPT

## 2024-09-19 PROCEDURE — 80061 LIPID PANEL: CPT

## 2024-09-19 PROCEDURE — 84165 PROTEIN E-PHORESIS SERUM: CPT

## 2024-09-19 PROCEDURE — 36415 COLL VENOUS BLD VENIPUNCTURE: CPT

## 2024-09-19 PROCEDURE — 85025 COMPLETE CBC W/AUTO DIFF WBC: CPT

## 2024-09-20 LAB
KAPPA LC FREE SER-MCNC: 17.7 MG/L (ref 3.3–19.4)
KAPPA LC FREE/LAMBDA FREE SER: 1.69 {RATIO} (ref 0.26–1.65)
LAMBDA LC FREE SERPL-MCNC: 10.5 MG/L (ref 5.7–26.3)
LPA SERPL-SCNC: 178.6 NMOL/L

## 2024-09-21 LAB
ALBUMIN SERPL ELPH-MCNC: 4.43 G/DL (ref 3.2–5.1)
ALBUMIN SERPL ELPH-MCNC: 59.9 % (ref 48–70)
ALPHA1 GLOB SERPL ELPH-MCNC: 0.26 G/DL (ref 0.15–0.47)
ALPHA1 GLOB SERPL ELPH-MCNC: 3.5 % (ref 1.8–7)
ALPHA2 GLOB SERPL ELPH-MCNC: 0.6 G/DL (ref 0.42–1.04)
ALPHA2 GLOB SERPL ELPH-MCNC: 8.1 % (ref 5.9–14.9)
BETA GLOB ABNORMAL SERPL ELPH-MCNC: 0.41 G/DL (ref 0.31–0.57)
BETA1 GLOB SERPL ELPH-MCNC: 5.5 % (ref 4.7–7.7)
BETA2 GLOB SERPL ELPH-MCNC: 6 % (ref 3.1–7.9)
BETA2+GAMMA GLOB SERPL ELPH-MCNC: 0.44 G/DL (ref 0.2–0.58)
GAMMA GLOB ABNORMAL SERPL ELPH-MCNC: 1.26 G/DL (ref 0.4–1.66)
GAMMA GLOB SERPL ELPH-MCNC: 17 % (ref 6.9–22.3)
IGG/ALB SER: 1.49 {RATIO} (ref 1.1–1.8)
PROT PATTERN SERPL ELPH-IMP: NORMAL
PROT SERPL-MCNC: 7.4 G/DL (ref 6.4–8.2)

## 2024-09-21 PROCEDURE — 84165 PROTEIN E-PHORESIS SERUM: CPT | Performed by: STUDENT IN AN ORGANIZED HEALTH CARE EDUCATION/TRAINING PROGRAM

## 2024-09-27 ENCOUNTER — OFFICE VISIT (OUTPATIENT)
Age: 59
End: 2024-09-27
Payer: COMMERCIAL

## 2024-09-27 ENCOUNTER — TELEPHONE (OUTPATIENT)
Dept: ADMINISTRATIVE | Facility: OTHER | Age: 59
End: 2024-09-27

## 2024-09-27 VITALS
RESPIRATION RATE: 17 BRPM | HEIGHT: 61 IN | SYSTOLIC BLOOD PRESSURE: 114 MMHG | WEIGHT: 137.8 LBS | DIASTOLIC BLOOD PRESSURE: 70 MMHG | TEMPERATURE: 96.9 F | BODY MASS INDEX: 26.01 KG/M2 | OXYGEN SATURATION: 99 % | HEART RATE: 69 BPM

## 2024-09-27 DIAGNOSIS — Z00.00 ADULT GENERAL MEDICAL EXAMINATION: Primary | ICD-10-CM

## 2024-09-27 DIAGNOSIS — E78.00 BORDERLINE HYPERCHOLESTEROLEMIA: Chronic | ICD-10-CM

## 2024-09-27 PROBLEM — Z80.3 FAMILY HISTORY OF BREAST CANCER: Status: ACTIVE | Noted: 2024-02-14

## 2024-09-27 PROCEDURE — 99396 PREV VISIT EST AGE 40-64: CPT | Performed by: INTERNAL MEDICINE

## 2024-09-27 NOTE — ASSESSMENT & PLAN NOTE
Can hold off statin for now. Would recommend repeating CT coronary calcium score in 3-5 years.    Nutrition and Diet:  A diet emphasizing intake of vegetables, fruits, legumes, nuts, whole grains, and fish is recommended. A diet containing reduced amounts of cholesterol and sodium can be beneficial.  Replacement of saturated fat with dietary mono- and poly-unsaturated fats can be beneficial.  Minimizing the intake of trans fats, processed meats, refined carbohydrates, and sweetened beverages as part of a heart healthy diet.    Physical Activity:  Engage in at least 150 minutes per week of accumulated moderate intensity or 75 minutes per week of vigorous intensity aerobic physical activity (or an equivalent combination of moderate and vigorous activity)    Engaging in some moderate or vigorous intensity physical activity, even if less than this recommended amount, can be beneficial to reduce cardiovascular risk.    Intensity METS Examples   Sedentary Behavior* 1-1.5 Sitting, reclining, or lying; watching TV   Light 1.6-2.9 Walking slowly, cooking, light house work   Moderate 3.0-5.9 Brisk walking (2.4-4mph), biking 5-9mph, ballroom dancing, active yoga, recreational swimming   Vigorous >=6 Jogging/running, biking >=10mph, singles tennis, swimming laps     *Sedentary behavior is defined as any waking behavior characterized by an energy expenditure <=1.5 metabolic equivalents (METs), while in a sitting, reclining, or lying posture. Standing is a sedentary activity in that it involves <=1.5 METs, but is not considered a component of sedentary behavior; mph indicates miles per hour.    Orders:    Lipid Panel with Direct LDL reflex; Future    Comprehensive metabolic panel; Future

## 2024-09-27 NOTE — PATIENT INSTRUCTIONS
"Patient Education     Routine physical for adults   The Basics   Written by the doctors and editors at [facility]   What is a physical? -- A physical is a routine visit, or \"check-up,\" with your doctor. You might also hear it called a \"wellness visit\" or \"preventive visit.\"  During each visit, the doctor will:   Ask about your physical and mental health   Ask about your habits, behaviors, and lifestyle   Do an exam   Give you vaccines if needed   Talk to you about any medicines you take   Give advice about your health   Answer your questions  Getting regular check-ups is an important part of taking care of your health. It can help your doctor find and treat any problems you have. But it's also important for preventing health problems.  A routine physical is different from a \"sick visit.\" A sick visit is when you see a doctor because of a health concern or problem. Since physicals are scheduled ahead of time, you can think about what you want to ask the doctor.  How often should I get a physical? -- It depends on your age and health. In general, for people age 21 years and older:   If you are younger than 50 years, you might be able to get a physical every 3 years.   If you are 50 years or older, your doctor might recommend a physical every year.  If you have an ongoing health condition, like diabetes or high blood pressure, your doctor will probably want to see you more often.  What happens during a physical? -- In general, each visit will include:   Physical exam - The doctor or nurse will check your height, weight, heart rate, and blood pressure. They will also look at your eyes and ears. They will ask about how you are feeling and whether you have any symptoms that bother you.   Medicines - It's a good idea to bring a list of all the medicines you take to each doctor visit. Your doctor will talk to you about your medicines and answer any questions. Tell them if you are having any side effects that bother you. You " "should also tell them if you are having trouble paying for any of your medicines.   Habits and behaviors - This includes:   Your diet   Your exercise habits   Whether you smoke, drink alcohol, or use drugs   Whether you are sexually active   Whether you feel safe at home  Your doctor will talk to you about things you can do to improve your health and lower your risk of health problems. They will also offer help and support. For example, if you want to quit smoking, they can give you advice and might prescribe medicines. If you want to improve your diet or get more physical activity, they can help you with this, too.   Lab tests, if needed - The tests you get will depend on your age and situation. For example, your doctor might want to check your:   Cholesterol   Blood sugar   Iron level   Vaccines - The recommended vaccines will depend on your age, health, and what vaccines you already had. Vaccines are very important because they can prevent certain serious or deadly infections.   Discussion of screening - \"Screening\" means checking for diseases or other health problems before they cause symptoms. Your doctor can recommend screening based on your age, risk, and preferences. This might include tests to check for:   Cancer, such as breast, prostate, cervical, ovarian, colorectal, prostate, lung, or skin cancer   Sexually transmitted infections, such as chlamydia and gonorrhea   Mental health conditions like depression and anxiety  Your doctor will talk to you about the different types of screening tests. They can help you decide which screenings to have. They can also explain what the results might mean.   Answering questions - The physical is a good time to ask the doctor or nurse questions about your health. If needed, they can refer you to other doctors or specialists, too.  Adults older than 65 years often need other care, too. As you get older, your doctor will talk to you about:   How to prevent falling at " home   Hearing or vision tests   Memory testing   How to take your medicines safely   Making sure that you have the help and support you need at home  All topics are updated as new evidence becomes available and our peer review process is complete.  This topic retrieved from [employer] on: May 02, 2024.  Topic 905099 Version 1.0  Release: 32.4.3 - C32.122  © 2024 UpToDate, Inc. and/or its affiliates. All rights reserved.  Consumer Information Use and Disclaimer   Disclaimer: This generalized information is a limited summary of diagnosis, treatment, and/or medication information. It is not meant to be comprehensive and should be used as a tool to help the user understand and/or assess potential diagnostic and treatment options. It does NOT include all information about conditions, treatments, medications, side effects, or risks that may apply to a specific patient. It is not intended to be medical advice or a substitute for the medical advice, diagnosis, or treatment of a health care provider based on the health care provider's examination and assessment of a patient's specific and unique circumstances. Patients must speak with a health care provider for complete information about their health, medical questions, and treatment options, including any risks or benefits regarding use of medications. This information does not endorse any treatments or medications as safe, effective, or approved for treating a specific patient. UpToDate, Inc. and its affiliates disclaim any warranty or liability relating to this information or the use thereof.The use of this information is governed by the Terms of Use, available at https://www.[employer]Dr[employer] Lawuwer.com/en/know/clinical-effectiveness-terms. 2024© UpToDate, Inc. and its affiliates and/or licensors. All rights reserved.  Copyright   © 2024 UpToDate, Inc. and/or its affiliates. All rights reserved.

## 2024-09-27 NOTE — TELEPHONE ENCOUNTER
----- Message from Toñito Escalera DO sent at 9/27/2024  7:41 AM EDT -----  Regarding: Thinprep Pap  Contact: [phone]  09/27/24 7:41 AM    Hello, our patient Fidelia Morales has had Pap Smear (HPV) aka Cervical Cancer Screening completed/performed. Please assist in updating the patient chart by pulling the Care Everywhere (CE) document. The date of service is 2021.     Thank you,  Toñito Escalera DO   [facility] [place]

## 2024-09-30 NOTE — TELEPHONE ENCOUNTER
Upon review of the In Basket request we were able to locate, review, and update the patient chart as requested for Pap Smear (HPV) aka Cervical Cancer Screening.    Any additional questions or concerns should be emailed to the Practice Liaisons via the appropriate education email address, please do not reply via In Basket.    Thank you  Nica Kim MA   [facility]

## 2024-10-10 LAB — APO B SERPL-MCNC: NORMAL MG/DL

## 2024-11-13 NOTE — PROGRESS NOTES
Pain Medicine Follow-Up Note    Assessment:  1. Spinal stenosis of lumbosacral region    2. S/P lumbar spinal fusion    3. Lumbar radiculopathy    4. Chronic pain syndrome    5. Lumbar post-laminectomy syndrome    6. Anxiety due to invasive procedure        Plan:  Orders Placed This Encounter   Procedures    FL spine and pain procedure     Standing Status:   Future     Expected Date:   11/15/2024     Expiration Date:   11/15/2028     Reason for Exam::   caudal TAMMI     Is the patient pregnant?:   No     Anticoagulant hold needed?:   no    MRI lumbar spine with and without contrast     Standing Status:   Future     Expected Date:   11/15/2024     Expiration Date:   11/15/2028     Scheduling Instructions:      There is no preparation for this test. Please leave your jewelry and valuables at home, wedding rings are the exception. All patients will be required to change into a hospital gown and pants.  Street clothes are not permitted in the MRI.  Magnetic nail polish must be removed prior to arrival for your test. Please bring your insurance cards, a form of photo ID and a list of your medications with you. Arrive 15 minutes prior to your appointment time in order to register. Please bring any prior CT or MRI studies of this area that were not performed at a [facility].            To schedule this appointment, please contact Central Scheduling at [phone].            Prior to your appointment, please make sure you complete the MRI Screening Form when you e-Check in for your appointment. This will be available starting 7 days before your appointment in [employer]. You may receive an e-mail with an activation code if you do not have a [employer] account. If you do not have access to a device, we will complete your screening at your appointment.     Reason for Exam:   worsening lumbar radiculopathy     Is the patient pregnant?:   No     What is the patient's sedation requirement?:   No Sedation     Does the  patient need medication for Claustrophobia? If yes, order medication at this point.:   Yes              ativan     Does the patient wear a life vest, have an implanted cardiac device, a stimulation device, a sleep apnea stimulator, or a breast tissue expansion device?:   No     Please evaluate if any patient has any of the following risk factors that require renal function labs within 90 days prior to the MRI appointment.:   None of the Above     [facility]   Im[facility]       New Medications Ordered This Visit   Medications    gabapentin (NEURONTIN) 800 mg tablet     Sig: Take 1 tablet (800 mg total) by mouth 2 (two) times a day AND 1.5 tablets (1,200 mg total) daily at bedtime.     Dispense:  315 tablet     Refill:  1    meloxicam (MOBIC) 7.5 mg tablet     Sig: Take 1 tablet (7.5 mg total) by mouth daily as needed for moderate pain     Dispense:  90 tablet     Refill:  1    LORazepam (Ativan) 0.5 mg tablet     Sig: Take 1 tablet 1 hour prior to procedure may take 2nd tablet at time of procedure ( 2 events MRI+TAMMI)     Dispense:  4 tablet     Refill:  0       My impressions and treatment recommendations were discussed in detail with the patient who verbalized understanding and had no further questions.      Patient returns to the office stating that her pain has worsened patient looking for additional options to treat her pain that radiates down her bilateral lower extremities.  Patient has been using gabapentin 800 mg twice daily and 1200 mg at bedtime she states she feels that the bedtime dose helps her sleep but she really does not notice any significant difference using the 800 twice a day.  She continues to use meloxicam 7.5 mg daily. The patient was cautioned about possible side effects and risks of NSAID medications including stomach upset, stomach ulcers, kidney risks and cardiovascular risks to include hypertension and slightly increased risks of stroke and MI.  Recent labs reviewed  I will refill meloxicam 7.5 mg daily as needed for moderate pain and patient to remain on gabapentin 800 mg twice daily and 1200 mg at bedtime.    Patient had a lumbar fusion in 2019 and has not had updated imaging since.  I will order a MRI with and without contrast to further investigate the cause of the patient's pain, I will order a caudal epidural injection since the patient is fused at L5-S1 and pain follows a L5 dermatomal distribution bilaterally. Complete risks and benefits including bleeding, infection, tissue reaction, nerve injury and allergic reaction were discussed. The approach was demonstrated using models and literature was provided. Verbal and written consent was obtained.    Patient is concerned about having a panic attack with epidural injection I will provide the patient Ativan 0.5 mg tablet patient may take 1 tablet an hour prior to procedure and may take a second tablet immediately before procedure.  Patient asking if any chance of being completely sedated.  Discussed with patient that if she feels this is necessary that she may need to seek a different provider to perform the procedure.    Follow-up is planned in 6 months time or sooner as warranted.  Discharge instructions were provided. I personally saw and examined the patient and I agree with the above discussed plan of care.    History of Present Illness:    Fidelia Morales is a 59 y.o. female who presents to [facility] for interval re-evaluation of the above stated pain complaints. The patient has a past medical and chronic pain history as outlined in the assessment section. She was last seen on 5/17/2024.    At today's visit patient states that their pain symptoms are worse with a pain score of 7/10 on the verbal numeric pain scale.  The patient's pain is worse in the morning, evening, and at night.  The patient's pain is constant in nature.  And the quality of the patient's pain is described as sharp,  throbbing, cramping, pressure-like, shooting, and pins-and-needles.  The patient's pain is located in the bilateral lower extremities.  Patient does feel that the amount of pain relief she is obtaining from her current pain relievers is enough to make a difference in her life but she mostly feels that it only helps during the nighttime.  Patient denies any side effects using gabapentin or meloxicam.    Other than as stated above, the patient denies any interval changes in medications, medical condition, mental condition, symptoms, or allergies since the last office visit.         Review of Systems:    Review of Systems   Respiratory:  Negative for shortness of breath.    Cardiovascular:  Negative for chest pain.   Gastrointestinal:  Negative for constipation, diarrhea, nausea and vomiting.   Musculoskeletal:  Positive for arthralgias, back pain and gait problem. Negative for joint swelling and myalgias.   Skin:  Negative for rash.   Neurological:  Positive for weakness. Negative for dizziness and seizures.   All other systems reviewed and are negative.        Past Medical History:   Diagnosis Date    Abdominal pain     Allergic 1/1/2000    Arthralgia of pelvic region and thigh     Arthritis 11/4/2020    Chest pain     CTS (carpal tunnel syndrome)     Fibromyalgia     Hepatitis     Leukocytopenia     Leukorrhea     Temporomandibular joint sounds on opening and/or closing the jaw        Past Surgical History:   Procedure Laterality Date    BACK SURGERY      CHOLECYSTECTOMY      FOOT SURGERY Right     INCONTINENCE SURGERY      vaginal sling for stress incontinence    IR BIOPSY BONE MARROW  11/29/2022    LUMBAR FUSION  03/06/2019    [facility]    SINUS SURGERY      SPINAL FUSION  3/5/2019    SPINE SURGERY  03/05/2019    TUBAL LIGATION  1995       Family History   Problem Relation Age of Onset    Alzheimer's disease Father     Asthma Father     Heart attack Father     COPD Father     Hypertension Father     Heart disease Father      Alzheimer's disease Maternal Grandmother     Lymphoma Maternal Aunt     Diabetes Mother     Emphysema Maternal Grandfather        Social History     Occupational History    Occupation: homemaker   Tobacco Use    Smoking status: Never    Smokeless tobacco: Never   Vaping Use    Vaping status: Never Used   Substance and Sexual Activity    Alcohol use: No    Drug use: No    Sexual activity: Yes     Partners: Male     Birth control/protection: Female Sterilization, None         Current Outpatient Medications:     acetaminophen (TYLENOL) 325 mg tablet, Take 500 mg by mouth as needed for mild pain, Disp: , Rfl:     Ascorbic Acid (Vitamin C ER) 1000 MG TBCR, Take 1,000 mg by mouth, Disp: , Rfl:     Cholecalciferol 50 MCG (2000 UT) TABS, Take 2,000 Units by mouth, Disp: , Rfl:     cyclobenzaprine (FLEXERIL) 5 mg tablet, Take 1 tablet (5 mg total) by mouth 3 (three) times a day as needed for muscle spasms, Disp: 90 tablet, Rfl: 0    gabapentin (NEURONTIN) 800 mg tablet, Take 1 tablet (800 mg total) by mouth 2 (two) times a day AND 1.5 tablets (1,200 mg total) daily at bedtime., Disp: 315 tablet, Rfl: 1    hydroxychloroquine (PLAQUENIL) 200 mg tablet, Take 1 tablet (200 mg total) by mouth 2 (two) times a day with meals, Disp: 180 tablet, Rfl: 1    linaCLOtide (Linzess) 145 MCG CAPS, Take 1 capsule (145 mcg total) by mouth daily, Disp: 90 capsule, Rfl: 1    LORazepam (Ativan) 0.5 mg tablet, Take 1 tablet 1 hour prior to procedure may take 2nd tablet at time of procedure ( 2 events MRI+TAMMI), Disp: 4 tablet, Rfl: 0    meloxicam (MOBIC) 7.5 mg tablet, Take 1 tablet (7.5 mg total) by mouth daily as needed for moderate pain, Disp: 90 tablet, Rfl: 1    minoxidil (LONITEN) 2.5 mg tablet, TAKE ONE-HALF (1/2) TABLET DAILY, Disp: 45 tablet, Rfl: 3    mometasone (NASONEX) 50 mcg/act nasal spray, USE 2 SPRAYS IN EACH NOSTRIL DAILY, Disp: 51 g, Rfl: 3    multivitamin (THERAGRAN) TABS, Take 1 tablet by mouth daily, Disp: , Rfl:  "    Allergies   Allergen Reactions    Other Sneezing and Nasal Congestion     SEASONAL ALLERGIES       Physical Exam:    /79 (BP Location: Left arm)   Pulse 65   Ht 5' 1\" (1.549 m)   Wt 62.1 kg (137 lb)   BMI 25.89 kg/m²     Constitutional:normal, well developed, well nourished, alert, in no distress and non-toxic and no overt pain behavior.  Eyes:anicteric  HEENT:grossly intact  Neck:supple, symmetric, trachea midline and no masses   Pulmonary:even and unlabored  Cardiovascular:No edema or pitting edema present  Skin:Normal without rashes or lesions and well hydrated, surgical scars noted low back  Psychiatric:Mood and affect appropriate  Neurologic:Cranial Nerves II-XII grossly intact  Musculoskeletal:normal gait      Imaging  FL spine and pain procedure    (Results Pending)   MRI lumbar spine with and without contrast    (Results Pending)         Orders Placed This Encounter   Procedures    FL spine and pain procedure    MRI lumbar spine with and without contrast       This document was created using speech voice recognition software.   Grammatical errors, random word insertions, pronoun errors, and incomplete sentences are an occasional consequence of this system due to software limitations, ambient noise, and hardware issues.   Any formal questions or concerns about content, text, or information contained within the body of this dictation should be directly addressed to the provider for clarification.  "

## 2024-11-15 ENCOUNTER — PATIENT MESSAGE (OUTPATIENT)
Dept: PAIN MEDICINE | Facility: CLINIC | Age: 59
End: 2024-11-15

## 2024-11-15 ENCOUNTER — OFFICE VISIT (OUTPATIENT)
Dept: PAIN MEDICINE | Facility: CLINIC | Age: 59
End: 2024-11-15
Payer: COMMERCIAL

## 2024-11-15 ENCOUNTER — TELEPHONE (OUTPATIENT)
Age: 59
End: 2024-11-15

## 2024-11-15 VITALS
SYSTOLIC BLOOD PRESSURE: 121 MMHG | BODY MASS INDEX: 25.86 KG/M2 | HEART RATE: 65 BPM | HEIGHT: 61 IN | WEIGHT: 137 LBS | DIASTOLIC BLOOD PRESSURE: 79 MMHG

## 2024-11-15 DIAGNOSIS — M96.1 LUMBAR POST-LAMINECTOMY SYNDROME: ICD-10-CM

## 2024-11-15 DIAGNOSIS — Z98.1 S/P LUMBAR SPINAL FUSION: ICD-10-CM

## 2024-11-15 DIAGNOSIS — M54.16 LUMBAR RADICULOPATHY: ICD-10-CM

## 2024-11-15 DIAGNOSIS — M48.07 SPINAL STENOSIS OF LUMBOSACRAL REGION: Primary | ICD-10-CM

## 2024-11-15 DIAGNOSIS — F41.9 ANXIETY DUE TO INVASIVE PROCEDURE: ICD-10-CM

## 2024-11-15 DIAGNOSIS — G89.4 CHRONIC PAIN SYNDROME: ICD-10-CM

## 2024-11-15 PROCEDURE — 99214 OFFICE O/P EST MOD 30 MIN: CPT

## 2024-11-15 RX ORDER — LORAZEPAM 0.5 MG/1
TABLET ORAL
Qty: 4 TABLET | Refills: 0 | Status: SHIPPED | OUTPATIENT
Start: 2024-11-15

## 2024-11-15 RX ORDER — MELOXICAM 7.5 MG/1
7.5 TABLET ORAL DAILY PRN
Qty: 90 TABLET | Refills: 1 | Status: SHIPPED | OUTPATIENT
Start: 2024-11-15

## 2024-11-15 RX ORDER — GABAPENTIN 800 MG/1
TABLET ORAL
Qty: 315 TABLET | Refills: 1 | Status: SHIPPED | OUTPATIENT
Start: 2024-11-15

## 2024-11-15 NOTE — TELEPHONE ENCOUNTER
Caller: evi Mistry    Doctor: Sonny    Reason for call: pt requesting to speak with     Pt is looking to r/s to 16th, 17th, 18th or 19th of December.  She is ok with anytime of the day but prefers morning    Call back#: [phone]

## 2024-11-15 NOTE — PATIENT INSTRUCTIONS
Epidural Steroid Injection, Ambulatory Care   GENERAL INFORMATION:   What do I need to know about an epidural steroid injection?  An epidural steroid injection (TAMMI) is a procedure to inject steroid medicine into the epidural space. The epidural space is between your spinal cord and vertebrae. Steroids reduce inflammation and fluid buildup in your spine that may be causing pain. You may be given pain medicine along with the steroids.   How do I prepare for an TAMMI?  Your healthcare provider will talk to you about how to prepare for your procedure. He will tell you what medicines to take or not take on the day of your procedure. You may need to stop taking blood thinners or other medicines several days before your procedure. You may need to adjust any diabetes medicine you take on the day of your procedure. Steroid medicine can increase your blood sugar level.   What will happen during an TAMMI?   You will be given medicine to numb the procedure area. You will be awake for the procedure, but you will not feel pain. You may also be given medicine to help you relax during the procedure. Contrast liquid will be used to help your healthcare provider see the area better. Tell the healthcare provider if you have ever had an allergic reaction to contrast liquid.    Your healthcare provider may place the needle into your neck area, middle of your back, or tailbone area. He may inject the medicine next to the nerves that are causing your pain. He may instead inject the medicine into a larger area of the epidural space. This helps the medicine spread to more nerves. Your healthcare provider will use a fluoroscope to help guide the needle to the right place. A fluoroscope is a type of x-ray. After the procedure, a bandage will be placed over the injection site to prevent infection.  What are the risks of an TAMMI?  You may have temporary or permanent nerve damage or paralysis. You may have bleeding or develop a serious infection,  such as meningitis (swelling of the brain coverings). An abscess may also develop. You may need surgery to fix the abscess. You may have a seizure, anxiety, or trouble sleeping. If you are a man, you may have temporary erectile dysfunction (not able to have an erection).   CARE AGREEMENT:   You have the right to help plan your care. Learn about your health condition and how it may be treated. Discuss treatment options with your caregivers to decide what care you want to receive. You always have the right to refuse treatment. The above information is an  only. It is not intended as medical advice for individual conditions or treatments. Talk to your doctor, nurse or pharmacist before following any medical regimen to see if it is safe and effective for you.  © 2014 [employer] Information is for End User's use only and may not be sold, redistributed or otherwise used for commercial purposes. All illustrations and images included in CareNotes® are the copyrighted property of [employer] or [employer].

## 2024-11-15 NOTE — PATIENT COMMUNICATION
Pt is scheduled for caudal morris with Dr Dennis on 12/19/24    Pt is not diabetic and does not take prescription blood thinners or aspirin    Pt given instructions in office and via myc message    Have you completed PT/HEP/Chiro in the past 6 months for dedicated area? No -- pt has had spine sx and tried meds for pain relief  If yes, how long did you complete?  What was the frequency?  Did it provide relief?  If no, reason therapy was not completed?

## 2024-12-13 ENCOUNTER — HOSPITAL ENCOUNTER (OUTPATIENT)
Dept: MRI IMAGING | Facility: CLINIC | Age: 59
Discharge: HOME | End: 2024-12-13

## 2024-12-13 DIAGNOSIS — Z98.1 S/P LUMBAR SPINAL FUSION: ICD-10-CM

## 2024-12-13 DIAGNOSIS — M48.07 SPINAL STENOSIS OF LUMBOSACRAL REGION: ICD-10-CM

## 2024-12-16 ENCOUNTER — TELEPHONE (OUTPATIENT)
Age: 59
End: 2024-12-16

## 2024-12-16 NOTE — TELEPHONE ENCOUNTER
Patient called in stating that she would be coming iby the office on Thursday to  the kit to complete her lab work for Dr. Cooper. Please advise.

## 2024-12-16 NOTE — TELEPHONE ENCOUNTER
Caller: Fidelia ROMERO    Doctor: Dr Dennis    Reason for call: CX procedure will call back to reschedule    Call back#: N/A

## 2024-12-19 ENCOUNTER — TELEPHONE (OUTPATIENT)
Age: 59
End: 2024-12-19

## 2025-01-20 ENCOUNTER — HOSPITAL ENCOUNTER (OUTPATIENT)
Dept: MRI IMAGING | Facility: HOSPITAL | Age: 60
Discharge: HOME | End: 2025-01-20
Payer: COMMERCIAL

## 2025-01-20 PROCEDURE — A9585 GADOBUTROL INJECTION: HCPCS

## 2025-01-20 PROCEDURE — 72158 MRI LUMBAR SPINE W/O & W/DYE: CPT

## 2025-01-20 RX ORDER — GADOBUTROL 604.72 MG/ML
6 INJECTION INTRAVENOUS
Status: COMPLETED | OUTPATIENT
Start: 2025-01-20 | End: 2025-01-20

## 2025-01-20 RX ADMIN — GADOBUTROL 6 ML: 604.72 INJECTION INTRAVENOUS at 06:40

## 2025-01-21 ENCOUNTER — RESULTS FOLLOW-UP (OUTPATIENT)
Dept: PAIN MEDICINE | Facility: CLINIC | Age: 60
End: 2025-01-21

## 2025-01-21 ENCOUNTER — APPOINTMENT (OUTPATIENT)
Dept: LAB | Facility: HOSPITAL | Age: 60
End: 2025-01-21
Attending: INTERNAL MEDICINE
Payer: COMMERCIAL

## 2025-01-21 DIAGNOSIS — Z79.899 NEED FOR PROPHYLACTIC CHEMOTHERAPY: ICD-10-CM

## 2025-01-21 DIAGNOSIS — M15.4 EROSIVE OSTEOARTHRITIS: ICD-10-CM

## 2025-01-21 DIAGNOSIS — M19.90 SENILE ARTHRITIS: ICD-10-CM

## 2025-01-21 PROCEDURE — 36415 COLL VENOUS BLD VENIPUNCTURE: CPT

## 2025-01-26 ENCOUNTER — RESULTS FOLLOW-UP (OUTPATIENT)
Age: 60
End: 2025-01-26

## 2025-01-26 DIAGNOSIS — K58.1 IRRITABLE BOWEL SYNDROME WITH CONSTIPATION: Chronic | ICD-10-CM

## 2025-01-27 DIAGNOSIS — M19.90 INFLAMMATORY OSTEOARTHRITIS: ICD-10-CM

## 2025-01-27 DIAGNOSIS — K58.1 IRRITABLE BOWEL SYNDROME WITH CONSTIPATION: Chronic | ICD-10-CM

## 2025-01-27 RX ORDER — LINACLOTIDE 145 UG/1
145 CAPSULE, GELATIN COATED ORAL DAILY
Qty: 90 CAPSULE | Refills: 0 | Status: SHIPPED | OUTPATIENT
Start: 2025-01-27

## 2025-01-27 NOTE — TELEPHONE ENCOUNTER
Reason for call:   [x] Refill   [] Prior Auth  [] Other:     Office:   [] PCP/Provider -   [x] Specialty/Provider - GASTRO    Medication: LINZESS  145 MCG Quantity: 90 MG  Hydroxychloroquine 200 mg    Pharmacy: [facility]    Does the patient have enough for 3 days?   [x] Yes   [] No - Send as HP to POD

## 2025-01-28 DIAGNOSIS — K58.1 IRRITABLE BOWEL SYNDROME WITH CONSTIPATION: Chronic | ICD-10-CM

## 2025-01-28 RX ORDER — LINACLOTIDE 145 UG/1
145 CAPSULE, GELATIN COATED ORAL DAILY
Qty: 90 CAPSULE | Refills: 1 | OUTPATIENT
Start: 2025-01-28

## 2025-01-28 RX ORDER — HYDROXYCHLOROQUINE SULFATE 200 MG/1
200 TABLET, FILM COATED ORAL 2 TIMES DAILY WITH MEALS
Qty: 180 TABLET | Refills: 1 | Status: SHIPPED | OUTPATIENT
Start: 2025-01-28 | End: 2025-07-27

## 2025-01-28 RX ORDER — LINACLOTIDE 145 UG/1
145 CAPSULE, GELATIN COATED ORAL DAILY
Qty: 90 CAPSULE | Refills: 0 | OUTPATIENT
Start: 2025-01-28

## 2025-02-19 ENCOUNTER — APPOINTMENT (OUTPATIENT)
Dept: LAB | Facility: HOSPITAL | Age: 60
End: 2025-02-19
Attending: INTERNAL MEDICINE
Payer: COMMERCIAL

## 2025-02-19 DIAGNOSIS — Z79.899 ENCOUNTER FOR LONG-TERM (CURRENT) USE OF OTHER MEDICATIONS: ICD-10-CM

## 2025-02-19 DIAGNOSIS — D72.819 LEUKOPENIA, UNSPECIFIED TYPE: ICD-10-CM

## 2025-02-19 DIAGNOSIS — M15.4 EROSIVE OSTEOARTHRITIS: ICD-10-CM

## 2025-02-19 DIAGNOSIS — M19.90 INFLAMMATORY OSTEOARTHRITIS: ICD-10-CM

## 2025-02-19 LAB
ALBUMIN SERPL BCG-MCNC: 4.1 G/DL (ref 3.5–5)
ALP SERPL-CCNC: 84 U/L (ref 34–104)
ALT SERPL W P-5'-P-CCNC: 32 U/L (ref 7–52)
ANION GAP SERPL CALCULATED.3IONS-SCNC: 7 MMOL/L (ref 4–13)
AST SERPL W P-5'-P-CCNC: 26 U/L (ref 13–39)
BACTERIA UR QL AUTO: NORMAL /HPF
BASOPHILS # BLD AUTO: 0.05 THOUSANDS/ΜL (ref 0–0.1)
BASOPHILS NFR BLD AUTO: 1 % (ref 0–1)
BILIRUB SERPL-MCNC: 0.43 MG/DL (ref 0.2–1)
BILIRUB UR QL STRIP: NEGATIVE
BUN SERPL-MCNC: 13 MG/DL (ref 5–25)
CALCIUM SERPL-MCNC: 9.6 MG/DL (ref 8.4–10.2)
CHLORIDE SERPL-SCNC: 105 MMOL/L (ref 96–108)
CLARITY UR: CLEAR
CO2 SERPL-SCNC: 29 MMOL/L (ref 21–32)
COLOR UR: COLORLESS
CREAT SERPL-MCNC: 0.81 MG/DL (ref 0.6–1.3)
CRP SERPL QL: 1.2 MG/L
EOSINOPHIL # BLD AUTO: 0.07 THOUSAND/ΜL (ref 0–0.61)
EOSINOPHIL NFR BLD AUTO: 2 % (ref 0–6)
ERYTHROCYTE [DISTWIDTH] IN BLOOD BY AUTOMATED COUNT: 12.1 % (ref 11.6–15.1)
ERYTHROCYTE [SEDIMENTATION RATE] IN BLOOD: 10 MM/HOUR (ref 0–29)
GFR SERPL CREATININE-BSD FRML MDRD: 79 ML/MIN/1.73SQ M
GLUCOSE P FAST SERPL-MCNC: 95 MG/DL (ref 65–99)
GLUCOSE UR STRIP-MCNC: NEGATIVE MG/DL
HCT VFR BLD AUTO: 39.3 % (ref 34.8–46.1)
HGB BLD-MCNC: 13 G/DL (ref 11.5–15.4)
HGB UR QL STRIP.AUTO: NEGATIVE
IMM GRANULOCYTES # BLD AUTO: 0.02 THOUSAND/UL (ref 0–0.2)
IMM GRANULOCYTES NFR BLD AUTO: 1 % (ref 0–2)
KETONES UR STRIP-MCNC: NEGATIVE MG/DL
LEUKOCYTE ESTERASE UR QL STRIP: NEGATIVE
LYMPHOCYTES # BLD AUTO: 1.73 THOUSANDS/ΜL (ref 0.6–4.47)
LYMPHOCYTES NFR BLD AUTO: 42 % (ref 14–44)
MCH RBC QN AUTO: 29.7 PG (ref 26.8–34.3)
MCHC RBC AUTO-ENTMCNC: 33.1 G/DL (ref 31.4–37.4)
MCV RBC AUTO: 90 FL (ref 82–98)
MONOCYTES # BLD AUTO: 0.22 THOUSAND/ΜL (ref 0.17–1.22)
MONOCYTES NFR BLD AUTO: 5 % (ref 4–12)
NEUTROPHILS # BLD AUTO: 2.01 THOUSANDS/ΜL (ref 1.85–7.62)
NEUTS SEG NFR BLD AUTO: 49 % (ref 43–75)
NITRITE UR QL STRIP: NEGATIVE
NON-SQ EPI CELLS URNS QL MICRO: NORMAL /HPF
NRBC BLD AUTO-RTO: 0 /100 WBCS
PH UR STRIP.AUTO: 5 [PH]
PLATELET # BLD AUTO: 296 THOUSANDS/UL (ref 149–390)
PMV BLD AUTO: 9.6 FL (ref 8.9–12.7)
POTASSIUM SERPL-SCNC: 4.1 MMOL/L (ref 3.5–5.3)
PROT SERPL-MCNC: 7.8 G/DL (ref 6.4–8.4)
PROT UR STRIP-MCNC: NEGATIVE MG/DL
RBC # BLD AUTO: 4.37 MILLION/UL (ref 3.81–5.12)
RBC #/AREA URNS AUTO: NORMAL /HPF
SODIUM SERPL-SCNC: 141 MMOL/L (ref 135–147)
SP GR UR STRIP.AUTO: 1.01 (ref 1–1.03)
UROBILINOGEN UR STRIP-ACNC: <2 MG/DL
WBC # BLD AUTO: 4.1 THOUSAND/UL (ref 4.31–10.16)
WBC #/AREA URNS AUTO: NORMAL /HPF

## 2025-02-19 PROCEDURE — 85652 RBC SED RATE AUTOMATED: CPT

## 2025-02-19 PROCEDURE — 86140 C-REACTIVE PROTEIN: CPT

## 2025-02-19 PROCEDURE — 85025 COMPLETE CBC W/AUTO DIFF WBC: CPT

## 2025-02-19 PROCEDURE — 36415 COLL VENOUS BLD VENIPUNCTURE: CPT

## 2025-02-19 PROCEDURE — 80053 COMPREHEN METABOLIC PANEL: CPT

## 2025-03-03 ENCOUNTER — TELEPHONE (OUTPATIENT)
Age: 60
End: 2025-03-03

## 2025-03-03 ENCOUNTER — OFFICE VISIT (OUTPATIENT)
Age: 60
End: 2025-03-03
Payer: COMMERCIAL

## 2025-03-03 VITALS
SYSTOLIC BLOOD PRESSURE: 122 MMHG | HEART RATE: 64 BPM | BODY MASS INDEX: 28.36 KG/M2 | DIASTOLIC BLOOD PRESSURE: 78 MMHG | OXYGEN SATURATION: 98 % | WEIGHT: 150.2 LBS | HEIGHT: 61 IN

## 2025-03-03 DIAGNOSIS — M48.07 SPINAL STENOSIS OF LUMBOSACRAL REGION: ICD-10-CM

## 2025-03-03 DIAGNOSIS — M96.1 POST LAMINECTOMY SYNDROME: ICD-10-CM

## 2025-03-03 DIAGNOSIS — Z79.899 LONG-TERM USE OF PLAQUENIL: ICD-10-CM

## 2025-03-03 DIAGNOSIS — M18.0 PRIMARY OSTEOARTHRITIS OF BOTH FIRST CARPOMETACARPAL JOINTS: ICD-10-CM

## 2025-03-03 DIAGNOSIS — D72.819 LEUKOPENIA, UNSPECIFIED TYPE: ICD-10-CM

## 2025-03-03 DIAGNOSIS — M19.90 INFLAMMATORY OSTEOARTHRITIS: ICD-10-CM

## 2025-03-03 DIAGNOSIS — M15.4 EROSIVE OSTEOARTHRITIS: ICD-10-CM

## 2025-03-03 DIAGNOSIS — M15.0 PRIMARY GENERALIZED (OSTEO)ARTHRITIS: Primary | ICD-10-CM

## 2025-03-03 DIAGNOSIS — K58.1 IRRITABLE BOWEL SYNDROME WITH CONSTIPATION: Chronic | ICD-10-CM

## 2025-03-03 PROCEDURE — 20600 DRAIN/INJ JOINT/BURSA W/O US: CPT | Performed by: INTERNAL MEDICINE

## 2025-03-03 PROCEDURE — 99214 OFFICE O/P EST MOD 30 MIN: CPT | Performed by: INTERNAL MEDICINE

## 2025-03-03 RX ORDER — METHYLPREDNISOLONE ACETATE 40 MG/ML
1 INJECTION, SUSPENSION INTRA-ARTICULAR; INTRALESIONAL; INTRAMUSCULAR; SOFT TISSUE
Status: COMPLETED | OUTPATIENT
Start: 2025-03-03 | End: 2025-03-03

## 2025-03-03 RX ORDER — PREDNISONE 1 MG/1
TABLET ORAL
Qty: 90 TABLET | Refills: 0 | Status: SHIPPED | OUTPATIENT
Start: 2025-03-03

## 2025-03-03 RX ADMIN — METHYLPREDNISOLONE ACETATE 1 ML: 40 INJECTION, SUSPENSION INTRA-ARTICULAR; INTRALESIONAL; INTRAMUSCULAR; SOFT TISSUE at 09:30

## 2025-03-03 NOTE — TELEPHONE ENCOUNTER
Caller: Fidelia ROMERO    Doctor: Dr. Dennis    Reason for call: Reschedule procedure     Call back#: [phone]

## 2025-03-03 NOTE — ASSESSMENT & PLAN NOTE
-Notes chronic neuropathic pain in legs.  -On Gabapentin 800 mg morning and afternoon, 1200 mg evening.  -Continue following with pain management,

## 2025-03-03 NOTE — PROGRESS NOTES
Name: Fidelia Morales      : 1965      MRN: [id number]  Encounter Provider: Enid Cooper MD  Encounter Date: 3/3/2025   Encounter department: [facility] RHEUMATOLOGY [facility] [address]  :  Assessment & Plan  Primary generalized (osteo)arthritis  -Followed for generalized OA with some improvement on Plaquenil - started 2023.  -Plaquenil level previously elevated, 1792, so her dose was reduced.  -Currently on Plaquenil 200 mg daily during the week, 200 mg BID on weekends.  -Has previously had Depo steroid injection to the right first CMC joint.  -Notes pain in right pinky, right middle finger, B/L thumbs.  -Using Tylenol and Mobic with some relief.    Plan:  -Injection of B/L thumbs for symptom relief done in office today.  -Will order low dose Prednisone - 3 mg for 2 weeks, then 2 mg for 2 weeks, then 1 mg for 2 weeks.  -Will order updated ultrasound of B/L hands and wrists - to be performed after completion of steroid course.  -Continue Plaquenil on current reduced dose.  -Repeat Plaquenil bloodwork in 6 months.  -Routine labwork monitoring.  -Follow up in 6 months.       Post laminectomy syndrome  -Notes chronic neuropathic pain in legs.  -On Gabapentin 800 mg morning and afternoon, 1200 mg evening.  -Continue following with pain management,       Irritable bowel syndrome with constipation  -On Linzess 145 mcg daily.  -Notes good effect overall.         Inflammatory osteoarthritis  -See A&P above.  Orders:    Small joint arthrocentesis: bilateral thumb CMC    predniSONE 1 mg tablet; Take 3 tablets daily for 1 week then decrease to 2 tablets daily for 1 week then decrease to 1 tablet daily for 1 week then discontinue prednisone.  Take with food.    US MSK limited; Future    C-reactive protein; Future    Sedimentation rate, automated; Future    CBC and differential; Future    Comprehensive metabolic panel; Future    Urinalysis with microscopic; Future    Erosive  "osteoarthritis  -See A&P above.  Orders:    Small joint arthrocentesis: bilateral thumb CMC    predniSONE 1 mg tablet; Take 3 tablets daily for 1 week then decrease to 2 tablets daily for 1 week then decrease to 1 tablet daily for 1 week then discontinue prednisone.  Take with food.    US MSK limited; Future    C-reactive protein; Future    Sedimentation rate, automated; Future    CBC and differential; Future    Comprehensive metabolic panel; Future    Urinalysis with microscopic; Future    Primary osteoarthritis of both first carpometacarpal joints         Spinal stenosis of lumbosacral region         Leukopenia, unspecified type         Long-term use of Plaquenil    Orders:    MISCELLANEOUS LAB TEST; Future        Pertinent Medical History   Past Medical History:   Diagnosis Date    Abdominal pain     Allergic 1/1/2000    Arthralgia of pelvic region and thigh     Arthritis 11/4/2020    Chest pain     CTS (carpal tunnel syndrome)     Fibromyalgia     Hepatitis     Leukocytopenia     Leukorrhea     Temporomandibular joint sounds on opening and/or closing the jaw          History of Present Illness   Fidelia Morales is a 59 y.o. female who presents for follow up.    Patient is a 58 y/o female with PMH including OA, leukopenia, asthma who presents today for follow up appointment. At her last visit, she was seen for her primary generalized OA with interphalangeal involvement. Today she notes overall worsening of her symptoms since last visit. She has had pain in her right pinky, right middle finger, B/L thumbs - thumbs and pinky are largely new. Notes 1 hour of morning stiffness daily. Her pain is worst, however, at night, when she \"gets stiff everywhere.\" She had her Plaquenil level checked, which was elevated, so her dose was reduced. She takes Tylenol and Mobic to help with her pain - it helps a bit, but does not fully take the pain away. Has seen pain management for her legs and spine - offered block but refused " at that time. She still has back pain, but it has been largely stable. Notes neuropathic pain in her legs.       Review of Systems   Constitutional:  Negative for chills and fever.   HENT:  Negative for ear pain and sore throat.    Eyes:  Negative for pain and visual disturbance.   Respiratory:  Negative for cough and shortness of breath.    Cardiovascular:  Negative for chest pain and palpitations.   Gastrointestinal:  Positive for constipation. Negative for abdominal pain, diarrhea, nausea and vomiting.        IBS   Genitourinary:  Negative for dysuria and hematuria.   Musculoskeletal:  Positive for arthralgias, back pain and joint swelling.   Skin:  Negative for color change and rash.   Neurological:  Negative for seizures and syncope.   Psychiatric/Behavioral:  The patient is not nervous/anxious.    All other systems reviewed and are negative.    Current Outpatient Medications on File Prior to Visit   Medication Sig Dispense Refill    acetaminophen (TYLENOL) 325 mg tablet Take 500 mg by mouth as needed for mild pain      Ascorbic Acid (Vitamin C ER) 1000 MG TBCR Take 1,000 mg by mouth      Cholecalciferol 50 MCG (2000 UT) TABS Take 2,000 Units by mouth      cyclobenzaprine (FLEXERIL) 5 mg tablet Take 1 tablet (5 mg total) by mouth 3 (three) times a day as needed for muscle spasms 90 tablet 0    gabapentin (NEURONTIN) 800 mg tablet Take 1 tablet (800 mg total) by mouth 2 (two) times a day AND 1.5 tablets (1,200 mg total) daily at bedtime. 315 tablet 1    hydroxychloroquine (PLAQUENIL) 200 mg tablet Take 1 tablet (200 mg total) by mouth 2 (two) times a day with meals 180 tablet 1    linaCLOtide (Linzess) 145 MCG CAPS Take 1 capsule (145 mcg total) by mouth daily 90 capsule 0    meloxicam (MOBIC) 7.5 mg tablet Take 1 tablet (7.5 mg total) by mouth daily as needed for moderate pain 90 tablet 1    minoxidil (LONITEN) 2.5 mg tablet TAKE ONE-HALF (1/2) TABLET DAILY 45 tablet 3    mometasone (NASONEX) 50 mcg/act nasal  "spray USE 2 SPRAYS IN EACH NOSTRIL DAILY 51 g 3    multivitamin (THERAGRAN) TABS Take 1 tablet by mouth daily      LORazepam (Ativan) 0.5 mg tablet Take 1 tablet 1 hour prior to procedure may take 2nd tablet at time of procedure ( 2 events MRI+TAMMI) (Patient not taking: Reported on 3/3/2025) 4 tablet 0     No current facility-administered medications on file prior to visit.          Objective   /78   Pulse 64   Ht 5' 0.5\" (1.537 m)   Wt 68.1 kg (150 lb 3.2 oz)   SpO2 98%   BMI 28.85 kg/m²     Physical Exam  Vitals and nursing note reviewed.   Constitutional:       General: She is not in acute distress.     Appearance: She is well-developed.   HENT:      Head: Normocephalic and atraumatic.      Right Ear: External ear normal.      Left Ear: External ear normal.      Nose: Nose normal.      Mouth/Throat:      Mouth: Mucous membranes are moist.   Eyes:      Conjunctiva/sclera: Conjunctivae normal.   Cardiovascular:      Rate and Rhythm: Normal rate and regular rhythm.      Heart sounds: No murmur heard.  Pulmonary:      Effort: Pulmonary effort is normal. No respiratory distress.      Breath sounds: Normal breath sounds.   Abdominal:      Palpations: Abdomen is soft.      Tenderness: There is no abdominal tenderness.   Musculoskeletal:         General: Swelling present.      Cervical back: Neck supple.      Comments: Tenderness of B/L wrist and thumbs, swelling of B/L hands noted. Arthritic changes of fingers.   Skin:     General: Skin is warm and dry.      Capillary Refill: Capillary refill takes less than 2 seconds.   Neurological:      Mental Status: She is alert.   Psychiatric:         Mood and Affect: Mood normal.         Recent labs:  Lab Results   Component Value Date/Time    SODIUM 141 02/19/2025 07:29 AM    K 4.1 02/19/2025 07:29 AM    K 4.3 06/17/2015 10:34 AM    BUN 13 02/19/2025 07:29 AM    BUN 12 06/17/2015 10:34 AM    CREATININE 0.81 02/19/2025 07:29 AM    CREATININE 0.6 06/17/2015 10:34 AM    " GLUC 84 01/24/2024 05:31 PM    GLUC 90 06/08/2022 08:07 AM    CALCIUM 9.6 02/19/2025 07:29 AM    CALCIUM 9.2 06/17/2015 10:34 AM    AST 26 02/19/2025 07:29 AM    AST 23 10/17/2014 08:15 AM    ALT 32 02/19/2025 07:29 AM    ALT 35 10/17/2014 08:15 AM    ALB 4.1 02/19/2025 07:29 AM    ALB 3.8 10/17/2014 08:15 AM    TP 7.8 02/19/2025 07:29 AM    EGFR 79 02/19/2025 07:29 AM     Lab Results   Component Value Date/Time    HGB 13.0 02/19/2025 07:29 AM    HGB 13.8 10/17/2014 08:15 AM    WBC 4.10 (L) 02/19/2025 07:29 AM    WBC 4.8 10/17/2014 08:15 AM     02/19/2025 07:29 AM     10/17/2014 08:15 AM    INR 0.92 01/24/2024 05:31 PM    PTT 27 01/24/2024 05:31 PM     Lab Results   Component Value Date/Time    FHZ5BQHGATDN 3.415 01/19/2021 07:15 AM    KBT4WQDGYFWZ 1.998 08/29/2014 11:05 AM

## 2025-03-03 NOTE — PROGRESS NOTES
Assessment/Plan:    Reviewed assessment and plan notes per resident and agree with the assessment and plan as documented.  I did perform bilateral first CMC steroid injections at this visit.  See procedure note.  Patient was referred for updated musculoskeletal ultrasound to further evaluate her ongoing symptoms.  While she has no evidence of erosions on the prior scan, clinically her symptoms are consistent with erosive osteoarthritis.  I have asked her to continue with Plaquenil at the reduced dosage of 200 mg each weekday and 400 mg each weekend day.  I have also given her prescription for trial of low-dose prednisone, as utilized in patients with erosive osteoarthritis, and a dose of 3 mg daily for 1 week to be decreased to 2 mg daily for 1 week and then to be decreased to 1 mg daily for 1 week prior to discontinuation.  I asked her to call me to report on response to low-dose prednisone.  Her right middle finger flexor tenosynovitis has no triggering, and therefore I do not recommend steroid injection at this time, but will continue to monitor.  With regard to her lumbar spondylosis with radicular symptoms left greater than right lower extremities, I have asked her to follow-up with pain management and I have recommended that she does go for the epidural as suggested by pain management for potential symptomatic relief.  Her signs and symptoms are consistent with postlaminectomy syndrome.  Plan follow-up in 6 months with lab work prior to the office visit to monitor disease activity and medication toxicity.  Will recheck Plaquenil level to make sure that she is therapeutic on the reduced dose.        Primary generalized (osteo)arthritis  -Followed for generalized OA with some improvement on Plaquenil - started October 2023.  -Plaquenil level previously elevated, 1792, so her dose was reduced.  -Currently on Plaquenil 200 mg daily during the week, 200 mg BID on weekends.  -Has previously had Depo steroid  injection to the right first CMC joint.  -Notes pain in right pinky, right middle finger, B/L thumbs.  -Using Tylenol and Mobic with some relief.    Plan:  -Injection of B/L thumbs for symptom relief done in office today.  -Will order low dose Prednisone - 3 mg for 2 weeks, then 2 mg for 2 weeks, then 1 mg for 2 weeks.  -Will order updated ultrasound of B/L hands and wrists - to be performed after completion of steroid course.  -Continue Plaquenil on current reduced dose.  -Repeat Plaquenil bloodwork in 6 months.  -Routine labwork monitoring.  -Follow up in 6 months.         Leukopenia           Spinal stenosis of lumbosacral region           Primary osteoarthritis of both first carpometacarpal joints           Erosive osteoarthritis  -See A&P above.  Orders:    Small joint arthrocentesis: bilateral thumb CMC    predniSONE 1 mg tablet; Take 3 tablets daily for 1 week then decrease to 2 tablets daily for 1 week then decrease to 1 tablet daily for 1 week then discontinue prednisone.  Take with food.    US MSK limited; Future    C-reactive protein; Future    Sedimentation rate, automated; Future    CBC and differential; Future    Comprehensive metabolic panel; Future    Urinalysis with microscopic; Future      Inflammatory osteoarthritis  -See A&P above.  Orders:    Small joint arthrocentesis: bilateral thumb CMC    predniSONE 1 mg tablet; Take 3 tablets daily for 1 week then decrease to 2 tablets daily for 1 week then decrease to 1 tablet daily for 1 week then discontinue prednisone.  Take with food.    US MSK limited; Future    C-reactive protein; Future    Sedimentation rate, automated; Future    CBC and differential; Future    Comprehensive metabolic panel; Future    Urinalysis with microscopic; Future      Irritable bowel syndrome  -On Linzess 145 mcg daily.  -Notes good effect overall.           Post laminectomy syndrome  -Notes chronic neuropathic pain in legs.  -On Gabapentin 800 mg morning and afternoon, 1200  mg evening.  -Continue following with pain management,       Leukopenia  Patient did have hematology consultation with bone marrow aspirate and biopsy significant for normocellular marrow with 4% plasma cells with kappa lambda ratio 2:1.  Bone marrow was negative for malignancy.  Continue follow-up with hematology as scheduled.    Spinal stenosis of lumbosacral region  Postlaminectomy syndrome.  Continue follow-up with pain management.         Problem List Items Addressed This Visit       Spinal stenosis of lumbosacral region                  Irritable bowel syndrome (Chronic)    -On Linzess 145 mcg daily.  -Notes good effect overall.                Primary generalized (osteo)arthritis - Primary    -Followed for generalized OA with some improvement on Plaquenil - started October 2023.  -Plaquenil level previously elevated, 1792, so her dose was reduced.  -Currently on Plaquenil 200 mg daily during the week, 200 mg BID on weekends.  -Has previously had Depo steroid injection to the right first CMC joint.  -Notes pain in right pinky, right middle finger, B/L thumbs.  -Using Tylenol and Mobic with some relief.    Plan:  -Injection of B/L thumbs for symptom relief done in office today.  -Will order low dose Prednisone - 3 mg for 2 weeks, then 2 mg for 2 weeks, then 1 mg for 2 weeks.  -Will order updated ultrasound of B/L hands and wrists - to be performed after completion of steroid course.  -Continue Plaquenil on current reduced dose.  -Repeat Plaquenil bloodwork in 6 months.  -Routine labwork monitoring.  -Follow up in 6 months.              Primary osteoarthritis of both first carpometacarpal joints                  Inflammatory osteoarthritis    -See A&P above.  Orders:    Small joint arthrocentesis: bilateral thumb CMC    predniSONE 1 mg tablet; Take 3 tablets daily for 1 week then decrease to 2 tablets daily for 1 week then decrease to 1 tablet daily for 1 week then discontinue prednisone.  Take with food.    US  MSK limited; Future    C-reactive protein; Future    Sedimentation rate, automated; Future    CBC and differential; Future    Comprehensive metabolic panel; Future    Urinalysis with microscopic; Future           Relevant Medications    predniSONE 1 mg tablet    methylPREDNISolone acetate (DEPO-MEDROL) injection 1 mL (Completed)    methylPREDNISolone acetate (DEPO-MEDROL) injection 1 mL (Completed)    Other Relevant Orders    Small joint arthrocentesis: bilateral thumb CMC (Completed)    US MSK limited    C-reactive protein    Sedimentation rate, automated    CBC and differential    Comprehensive metabolic panel    Urinalysis with microscopic    Erosive osteoarthritis    -See A&P above.  Orders:    Small joint arthrocentesis: bilateral thumb CMC    predniSONE 1 mg tablet; Take 3 tablets daily for 1 week then decrease to 2 tablets daily for 1 week then decrease to 1 tablet daily for 1 week then discontinue prednisone.  Take with food.    US MSK limited; Future    C-reactive protein; Future    Sedimentation rate, automated; Future    CBC and differential; Future    Comprehensive metabolic panel; Future    Urinalysis with microscopic; Future           Relevant Medications    predniSONE 1 mg tablet    methylPREDNISolone acetate (DEPO-MEDROL) injection 1 mL (Completed)    methylPREDNISolone acetate (DEPO-MEDROL) injection 1 mL (Completed)    Other Relevant Orders    Small joint arthrocentesis: bilateral thumb CMC (Completed)    US MSK limited    C-reactive protein    Sedimentation rate, automated    CBC and differential    Comprehensive metabolic panel    Urinalysis with microscopic    Post laminectomy syndrome    -Notes chronic neuropathic pain in legs.  -On Gabapentin 800 mg morning and afternoon, 1200 mg evening.  -Continue following with pain management,              Leukopenia                  Relevant Medications    predniSONE 1 mg tablet    methylPREDNISolone acetate (DEPO-MEDROL) injection 1 mL (Completed)     methylPREDNISolone acetate (DEPO-MEDROL) injection 1 mL (Completed)     Other Visit Diagnoses         Long-term use of Plaquenil        Relevant Orders    MISCELLANEOUS LAB TEST                Reviewed records, labs, and imaging with the patient in detail.  Counseled patient.  Discussion regarding my findings and recommendations.  Office visit with documentation 35 min.    Subjective:      Patient ID: Fidelia Morales is a 59 y.o. female.    Patient presents for follow-up of her osteoarthritis.  She has a history of 1st CMC osteoarthritis and had injections done in the past.  She has had persistent pain and discomfort in her hands and fingers with occasional soft tissue swelling.  She had a musculoskeletal ultrasound done in April 2023 of her bilateral hands and wrists which revealed no evidence for inflammatory arthritis.  She did have degenerative changes with osteophytes in the DIP joints of her right hand.  In February 2023 sulfasalazine was added for inflammatory osteoarthritis symptoms.  This caused GI upset and was discontinued.  She started Plaquenil in October 2023.  Overall she has noticed an improvement in her joint pain with the addition of Plaquenil.  Her hand stiffness is generally worse at night after using her hands all day.  She does note that she is sensitive to weather changes with rain and has an increase in joint pain and stiffness due to this.  She also takes meloxicam as needed for joint pain.  More recently she notes an increase in bilateral first CMC joint pain and right middle finger PIP arthralgias.  She is requesting a steroid injections bilateral first CMC joint.  She also has pain in the right middle finger with locking sensation.    She has a history of lumbar degenerative disc disease status post decompression and revision with chronic neuropathic symptoms.  She follows with Pain Management.  I have suggested a follow-up epidural steroid injection, however, she has not yet scheduled  this.  She has a history of chronic pain with likely small fiber neuropathy.  She has been using a compounded cream prescribed by pain management with benefit.  She follows with hematology for history of neutropenia.    Lab work dated 2/19/2025 significant for white blood cell count 4.10 with absolute neutrophil count 2.01.  Sed rate 10.  CRP 1.2.  Calcium 9.6 creatinine 0.81 and estimated GFR 79.  Plaquenil level dated 1/21/2021 1792.4.  This prompted a decrease in her Plaquenil dosing to 200 mg each weekday and 200 mg twice daily each weekend day.  Review of lab work dated 7/11/2024 significant for vitamin D 69.9.  White blood cell count 3.08 with absolute neutrophil count 1.32 stable.  Sed rate 12.  CRP less than 1.0.  Calcium 10.3 with corrected calcium 9.8 normal.  Estimated GFR 84.  Creatinine 0.77.            Allergies  Allergies   Allergen Reactions    Other Sneezing and Nasal Congestion     SEASONAL ALLERGIES       Home Medications    Current Outpatient Medications:     acetaminophen (TYLENOL) 325 mg tablet, Take 500 mg by mouth as needed for mild pain, Disp: , Rfl:     Ascorbic Acid (Vitamin C ER) 1000 MG TBCR, Take 1,000 mg by mouth, Disp: , Rfl:     Cholecalciferol 50 MCG (2000 UT) TABS, Take 2,000 Units by mouth, Disp: , Rfl:     cyclobenzaprine (FLEXERIL) 5 mg tablet, Take 1 tablet (5 mg total) by mouth 3 (three) times a day as needed for muscle spasms, Disp: 90 tablet, Rfl: 0    gabapentin (NEURONTIN) 800 mg tablet, Take 1 tablet (800 mg total) by mouth 2 (two) times a day AND 1.5 tablets (1,200 mg total) daily at bedtime., Disp: 315 tablet, Rfl: 1    hydroxychloroquine (PLAQUENIL) 200 mg tablet, Take 1 tablet (200 mg total) by mouth 2 (two) times a day with meals, Disp: 180 tablet, Rfl: 1    linaCLOtide (Linzess) 145 MCG CAPS, Take 1 capsule (145 mcg total) by mouth daily, Disp: 90 capsule, Rfl: 0    meloxicam (MOBIC) 7.5 mg tablet, Take 1 tablet (7.5 mg total) by mouth daily as needed for moderate  pain, Disp: 90 tablet, Rfl: 1    minoxidil (LONITEN) 2.5 mg tablet, TAKE ONE-HALF (1/2) TABLET DAILY, Disp: 45 tablet, Rfl: 3    mometasone (NASONEX) 50 mcg/act nasal spray, USE 2 SPRAYS IN EACH NOSTRIL DAILY, Disp: 51 g, Rfl: 3    multivitamin (THERAGRAN) TABS, Take 1 tablet by mouth daily, Disp: , Rfl:     predniSONE 1 mg tablet, Take 3 tablets daily for 1 week then decrease to 2 tablets daily for 1 week then decrease to 1 tablet daily for 1 week then discontinue prednisone.  Take with food., Disp: 90 tablet, Rfl: 0    LORazepam (Ativan) 0.5 mg tablet, Take 1 tablet 1 hour prior to procedure may take 2nd tablet at time of procedure ( 2 events MRI+TAMMI) (Patient not taking: Reported on 3/3/2025), Disp: 4 tablet, Rfl: 0  No current facility-administered medications for this visit.    Past Medical History  Past Medical History:   Diagnosis Date    Abdominal pain     Allergic 1/1/2000    Arthralgia of pelvic region and thigh     Arthritis 11/4/2020    Chest pain     CTS (carpal tunnel syndrome)     Fibromyalgia     Hepatitis     Leukocytopenia     Leukorrhea     Temporomandibular joint sounds on opening and/or closing the jaw        Past Surgical History   Past Surgical History:   Procedure Laterality Date    BACK SURGERY      CHOLECYSTECTOMY      FOOT SURGERY Right     INCONTINENCE SURGERY      vaginal sling for stress incontinence    IR BIOPSY BONE MARROW  11/29/2022    LUMBAR FUSION  03/06/2019    [facility]    SINUS SURGERY      SPINAL FUSION  3/5/2019    SPINE SURGERY  03/05/2019    TUBAL LIGATION  1995       Family History   Family History   Problem Relation Age of Onset    Alzheimer's disease Father     Asthma Father     Heart attack Father     COPD Father     Hypertension Father     Heart disease Father     Alzheimer's disease Maternal Grandmother     Lymphoma Maternal Aunt     Diabetes Mother     Emphysema Maternal Grandfather        The following portions of the patient's history were reviewed and updated as  "appropriate: allergies, current medications, past family history, past medical history, past social history, past surgical history, and problem list.    Review of Systems   Constitutional:  Negative for chills, fatigue and fever.   HENT:  Negative for hearing loss, sore throat and tinnitus.    Eyes:  Negative for pain and visual disturbance.   Respiratory:  Negative for cough and shortness of breath.    Cardiovascular:  Negative for chest pain and palpitations.   Gastrointestinal:  Positive for constipation. Negative for abdominal pain, nausea and vomiting.   Genitourinary:  Negative for difficulty urinating.   Musculoskeletal:  Positive for arthralgias and back pain. Negative for gait problem, joint swelling, myalgias, neck pain and neck stiffness.   Skin:  Negative for rash.   Neurological:  Positive for numbness. Negative for dizziness, seizures, weakness and headaches.   Psychiatric/Behavioral:  Negative for confusion, decreased concentration and sleep disturbance.          Objective:      /78   Pulse 64   Ht 5' 0.5\" (1.537 m)   Wt 68.1 kg (150 lb 3.2 oz)   SpO2 98%   BMI 28.85 kg/m²          Physical Exam  Vitals reviewed.   Constitutional:       Appearance: Normal appearance.   HENT:      Head: Normocephalic.      Nose:      Comments: Nose and throat unremarkable.  Eyes:      Extraocular Movements: Extraocular movements intact.   Neck:      Comments: Without masses, thyromegaly, lymphadenopathy  Cardiovascular:      Rate and Rhythm: Normal rate and regular rhythm.   Pulmonary:      Breath sounds: Normal breath sounds.   Abdominal:      Palpations: Abdomen is soft.   Musculoskeletal:      Cervical back: Neck supple.      Comments: Decreased lateral flexion neck with triggers posterior cervical and shoulder girdle muscles.  Full range of motion bilateral shoulders, elbows, wrists.  Triggers lateral and medial epicondyles bilaterally.  Interphalangeal OA changes, squaring 1st CMC joints, Heberden's " nodes, Masoud's nodes bilateral hands.  Exquisite tenderness right>left first carpometacarpal joints. to palpation.  Tenderness RLF PIP joint.  No synovitis noted.  Thickening right middle finger flexor tendon sheath with no triggering noted.  Straight leg raising negative bilaterally.  Marked spasm noted lumbosacral paraspinals.  Full range of motion bilateral hips, knees, ankles.  Patellofemoral crepitus noted bilateral knees.  Rearfoot hyperpronation, hammertoe deformities, midfoot cavus deformities bilateral feet.   Skin:     General: Skin is warm and dry.   Neurological:      General: No focal deficit present.      Mental Status: She is alert.           Small joint arthrocentesis: bilateral thumb CMC  [place] Protocol:  procedure performed by consultantConsent: Verbal consent obtained.  Risks and benefits: risks, benefits and alternatives were discussed  Consent given by: patient  Timeout called at: 3/3/2025 10:10 AM.  Patient understanding: patient states understanding of the procedure being performed  Patient consent: the patient's understanding of the procedure matches consent given  Procedure consent: procedure consent matches procedure scheduled  Relevant documents: relevant documents present and verified  Test results: test results available and properly labeled  Site marked: the operative site was marked  Radiology Images displayed and confirmed. If images not available, report reviewed: imaging studies available  Patient identity confirmed: verbally with patient  Supporting Documentation  Indications: joint swelling and pain   Procedure Details  Location: thumb - bilateral thumb CMC  Preparation: Patient was prepped and draped in the usual sterile fashion  Needle size: 22 G  Ultrasound guidance: no  Approach: dorsal    Medications (Right): 1 mL methylPREDNISolone acetate 40 mg/mLMedications (Left): 1 mL methylPREDNISolone acetate 40 mg/mL   Patient tolerance: patient tolerated the procedure well  with no immediate complications  Dressing:  Sterile dressing applied     Told to ice 4 times a day over the next 4 days.           This note was written in part using the assistance of the [employer] [id number] microphone system. Those portions using this system have been dictated and not read.

## 2025-03-03 NOTE — PATIENT INSTRUCTIONS
Start the low-dose prednisone as requested.  The orders on how to taper will be on the bottle.  Schedule your ultrasound of your hands and wrists however make sure it is not scheduled till approximately 1 month after you discontinue the steroids.  Get lab work 2 weeks before your next office visit.  That will include a repeat Plaquenil blood level.  Continue the Plaquenil 200 mg each weekday and 200 mg twice daily each weekend day.  If you need to get an injection in your hands after 3 months, just call the office to schedule.  I would suggest that you do follow through with the epidural steroid injection for your back as you have not gotten better with conservative treatment.  Continue with the paraffin wax and home exercise program for your hands.

## 2025-03-03 NOTE — ASSESSMENT & PLAN NOTE
-See A&P above.  Orders:    Small joint arthrocentesis: bilateral thumb CMC    predniSONE 1 mg tablet; Take 3 tablets daily for 1 week then decrease to 2 tablets daily for 1 week then decrease to 1 tablet daily for 1 week then discontinue prednisone.  Take with food.    US MSK limited; Future    C-reactive protein; Future    Sedimentation rate, automated; Future    CBC and differential; Future    Comprehensive metabolic panel; Future    Urinalysis with microscopic; Future

## 2025-03-03 NOTE — ASSESSMENT & PLAN NOTE
-Followed for generalized OA with some improvement on Plaquenil - started October 2023.  -Plaquenil level previously elevated, 1792, so her dose was reduced.  -Currently on Plaquenil 200 mg daily during the week, 200 mg BID on weekends.  -Has previously had Depo steroid injection to the right first CMC joint.  -Notes pain in right pinky, right middle finger, B/L thumbs.  -Using Tylenol and Mobic with some relief.    Plan:  -Injection of B/L thumbs for symptom relief done in office today.  -Will order low dose Prednisone - 3 mg for 2 weeks, then 2 mg for 2 weeks, then 1 mg for 2 weeks.  -Will order updated ultrasound of B/L hands and wrists - to be performed after completion of steroid course.  -Continue Plaquenil on current reduced dose.  -Repeat Plaquenil bloodwork in 6 months.  -Routine labwork monitoring.  -Follow up in 6 months.

## 2025-03-20 ENCOUNTER — HOSPITAL ENCOUNTER (OUTPATIENT)
Dept: RADIOLOGY | Facility: CLINIC | Age: 60
Discharge: HOME | End: 2025-03-20
Payer: COMMERCIAL

## 2025-03-20 VITALS
OXYGEN SATURATION: 98 % | RESPIRATION RATE: 20 BRPM | HEART RATE: 68 BPM | TEMPERATURE: 97.8 F | DIASTOLIC BLOOD PRESSURE: 84 MMHG | SYSTOLIC BLOOD PRESSURE: 130 MMHG

## 2025-03-20 DIAGNOSIS — Z98.1 S/P LUMBAR SPINAL FUSION: ICD-10-CM

## 2025-03-20 DIAGNOSIS — M54.16 LUMBAR RADICULOPATHY: ICD-10-CM

## 2025-03-20 DIAGNOSIS — M48.07 SPINAL STENOSIS OF LUMBOSACRAL REGION: ICD-10-CM

## 2025-03-20 RX ORDER — METHYLPREDNISOLONE ACETATE 80 MG/ML
80 INJECTION, SUSPENSION INTRA-ARTICULAR; INTRALESIONAL; INTRAMUSCULAR; PARENTERAL; SOFT TISSUE ONCE
Status: COMPLETED | OUTPATIENT
Start: 2025-03-20 | End: 2025-03-20

## 2025-03-20 RX ADMIN — IOHEXOL 1 ML: 300 INJECTION, SOLUTION INTRAVENOUS at 09:55

## 2025-03-20 RX ADMIN — METHYLPREDNISOLONE ACETATE 80 MG: 80 INJECTION, SUSPENSION INTRA-ARTICULAR; INTRALESIONAL; INTRAMUSCULAR; SOFT TISSUE at 09:55

## 2025-03-20 NOTE — H&P
History of Present Illness: The patient is a 59 y.o. female who presents with complaints of back and leg pain    Past Medical History:   Diagnosis Date    Abdominal pain     Allergic 1/1/2000    Arthralgia of pelvic region and thigh     Arthritis 11/4/2020    Chest pain     CTS (carpal tunnel syndrome)     Fibromyalgia     Hepatitis     Leukocytopenia     Leukorrhea     Temporomandibular joint sounds on opening and/or closing the jaw        Past Surgical History:   Procedure Laterality Date    BACK SURGERY      CHOLECYSTECTOMY      FOOT SURGERY Right     INCONTINENCE SURGERY      vaginal sling for stress incontinence    IR BIOPSY BONE MARROW  11/29/2022    LUMBAR FUSION  03/06/2019    [facility]    SINUS SURGERY      SPINAL FUSION  3/5/2019    SPINE SURGERY  03/05/2019    TUBAL LIGATION  1995         Current Outpatient Medications:     acetaminophen (TYLENOL) 325 mg tablet, Take 500 mg by mouth as needed for mild pain, Disp: , Rfl:     Ascorbic Acid (Vitamin C ER) 1000 MG TBCR, Take 1,000 mg by mouth, Disp: , Rfl:     Cholecalciferol 50 MCG (2000 UT) TABS, Take 2,000 Units by mouth, Disp: , Rfl:     cyclobenzaprine (FLEXERIL) 5 mg tablet, Take 1 tablet (5 mg total) by mouth 3 (three) times a day as needed for muscle spasms, Disp: 90 tablet, Rfl: 0    gabapentin (NEURONTIN) 800 mg tablet, Take 1 tablet (800 mg total) by mouth 2 (two) times a day AND 1.5 tablets (1,200 mg total) daily at bedtime., Disp: 315 tablet, Rfl: 1    hydroxychloroquine (PLAQUENIL) 200 mg tablet, Take 1 tablet (200 mg total) by mouth 2 (two) times a day with meals, Disp: 180 tablet, Rfl: 1    linaCLOtide (Linzess) 145 MCG CAPS, Take 1 capsule (145 mcg total) by mouth daily, Disp: 90 capsule, Rfl: 0    LORazepam (Ativan) 0.5 mg tablet, Take 1 tablet 1 hour prior to procedure may take 2nd tablet at time of procedure ( 2 events MRI+TAMMI) (Patient not taking: Reported on 3/3/2025), Disp: 4 tablet, Rfl: 0    meloxicam (MOBIC) 7.5 mg tablet, Take 1 tablet  (7.5 mg total) by mouth daily as needed for moderate pain, Disp: 90 tablet, Rfl: 1    minoxidil (LONITEN) 2.5 mg tablet, TAKE ONE-HALF (1/2) TABLET DAILY, Disp: 45 tablet, Rfl: 3    mometasone (NASONEX) 50 mcg/act nasal spray, USE 2 SPRAYS IN EACH NOSTRIL DAILY, Disp: 51 g, Rfl: 3    multivitamin (THERAGRAN) TABS, Take 1 tablet by mouth daily, Disp: , Rfl:     predniSONE 1 mg tablet, Take 3 tablets daily for 1 week then decrease to 2 tablets daily for 1 week then decrease to 1 tablet daily for 1 week then discontinue prednisone.  Take with food., Disp: 90 tablet, Rfl: 0    Allergies   Allergen Reactions    Other Sneezing and Nasal Congestion     SEASONAL ALLERGIES       Physical Exam: There were no vitals filed for this visit.  General: Awake, Alert, Oriented x 3, Mood and affect appropriate  Respiratory: Respirations even and unlabored  Cardiovascular: Peripheral pulses intact; no edema  Musculoskeletal Exam: back non erythematous no lesions    ASA Score: 3         Assessment:   1. Spinal stenosis of lumbosacral region    2. S/P lumbar spinal fusion    3. Lumbar radiculopathy        Plan: caudal TAMMI

## 2025-03-20 NOTE — DISCHARGE INSTR - LAB
Epidural Steroid Injection   WHAT YOU NEED TO KNOW:   An epidural steroid injection (TAMMI) is a procedure to inject steroid medicine into the epidural space. The epidural space is between your spinal cord and vertebrae. Steroids reduce inflammation and fluid buildup in your spine that may be causing pain. You may be given pain medicine along with the steroids.          ACTIVITY  Do not drive or operate machinery today.  No strenuous activity today - bending, lifting, etc.  You may resume normal activites starting tomorrow - start slowly and as tolerated.  You may shower today, but no tub baths or hot tubs.  You may have numbness for several hours from the local anesthetic. Please use caution and common sense, especially with weight-bearing activities.    CARE OF THE INJECTION SITE  If you have soreness or pain, apply ice to the area today (20 minutes on/20 minutes off).  Starting tomorrow, you may use warm, moist heat or ice if needed.  You may have an increase or change in your discomfort for 36-48 hours after your treatment.  Apply ice and continue with any pain medication you have been prescribed.  Notify the [facility] if you have any of the following: redness, drainage, swelling, headache, stiff neck or fever above 100°F.    SPECIAL INSTRUCTIONS  Our office will contact you in approximately 14 days for a progress report.    MEDICATIONS  Continue to take all routine medications.  Our office may have instructed you to hold some medications.    As no general anesthesia was used in today's procedure, you should not experience any side effects related to anesthesia.     If you are diabetic, the steroids used in today's injection may temporarily increase your blood sugar levels after the first few days after your injection. Please keep a close eye on your sugars and alert the doctor who manages your diabetes if your sugars are significantly high from your baseline or you are symptomatic.     If you have a  problem specifically related to your procedure, please call our office at [phone].  Problems not related to your procedure should be directed to your primary care physician.

## 2025-03-24 ENCOUNTER — APPOINTMENT (OUTPATIENT)
Dept: LAB | Facility: HOSPITAL | Age: 60
End: 2025-03-24
Payer: COMMERCIAL

## 2025-03-24 ENCOUNTER — OFFICE VISIT (OUTPATIENT)
Dept: GASTROENTEROLOGY | Facility: CLINIC | Age: 60
End: 2025-03-24
Payer: COMMERCIAL

## 2025-03-24 VITALS
HEART RATE: 60 BPM | TEMPERATURE: 97 F | SYSTOLIC BLOOD PRESSURE: 122 MMHG | DIASTOLIC BLOOD PRESSURE: 80 MMHG | HEIGHT: 61 IN | BODY MASS INDEX: 28.02 KG/M2 | WEIGHT: 148.4 LBS | OXYGEN SATURATION: 98 %

## 2025-03-24 DIAGNOSIS — K58.1 IRRITABLE BOWEL SYNDROME WITH CONSTIPATION: Chronic | ICD-10-CM

## 2025-03-24 DIAGNOSIS — R14.0 BLOATING: ICD-10-CM

## 2025-03-24 DIAGNOSIS — R10.13 EPIGASTRIC PAIN: Primary | ICD-10-CM

## 2025-03-24 DIAGNOSIS — D70.9 NEUTROPENIA, UNSPECIFIED TYPE (HCC): ICD-10-CM

## 2025-03-24 LAB
BASOPHILS # BLD AUTO: 0.04 THOUSANDS/ÂΜL (ref 0–0.1)
BASOPHILS NFR BLD AUTO: 1 % (ref 0–1)
EOSINOPHIL # BLD AUTO: 0.06 THOUSAND/ÂΜL (ref 0–0.61)
EOSINOPHIL NFR BLD AUTO: 1 % (ref 0–6)
ERYTHROCYTE [DISTWIDTH] IN BLOOD BY AUTOMATED COUNT: 12.3 % (ref 11.6–15.1)
HCT VFR BLD AUTO: 38.9 % (ref 34.8–46.1)
HGB BLD-MCNC: 12.9 G/DL (ref 11.5–15.4)
IMM GRANULOCYTES # BLD AUTO: 0.02 THOUSAND/UL (ref 0–0.2)
IMM GRANULOCYTES NFR BLD AUTO: 0 % (ref 0–2)
LYMPHOCYTES # BLD AUTO: 1.84 THOUSANDS/ÂΜL (ref 0.6–4.47)
LYMPHOCYTES NFR BLD AUTO: 32 % (ref 14–44)
MCH RBC QN AUTO: 30.1 PG (ref 26.8–34.3)
MCHC RBC AUTO-ENTMCNC: 33.2 G/DL (ref 31.4–37.4)
MCV RBC AUTO: 91 FL (ref 82–98)
MONOCYTES # BLD AUTO: 0.33 THOUSAND/ÂΜL (ref 0.17–1.22)
MONOCYTES NFR BLD AUTO: 6 % (ref 4–12)
NEUTROPHILS # BLD AUTO: 3.5 THOUSANDS/ÂΜL (ref 1.85–7.62)
NEUTS SEG NFR BLD AUTO: 60 % (ref 43–75)
NRBC BLD AUTO-RTO: 0 /100 WBCS
PLATELET # BLD AUTO: 285 THOUSANDS/UL (ref 149–390)
PMV BLD AUTO: 10.1 FL (ref 8.9–12.7)
RBC # BLD AUTO: 4.28 MILLION/UL (ref 3.81–5.12)
WBC # BLD AUTO: 5.79 THOUSAND/UL (ref 4.31–10.16)

## 2025-03-24 PROCEDURE — 85025 COMPLETE CBC W/AUTO DIFF WBC: CPT

## 2025-03-24 PROCEDURE — 36415 COLL VENOUS BLD VENIPUNCTURE: CPT

## 2025-03-24 PROCEDURE — 99213 OFFICE O/P EST LOW 20 MIN: CPT | Performed by: PHYSICIAN ASSISTANT

## 2025-03-24 RX ORDER — LINACLOTIDE 145 UG/1
145 CAPSULE, GELATIN COATED ORAL DAILY
Qty: 90 CAPSULE | Refills: 3 | Status: SHIPPED | OUTPATIENT
Start: 2025-03-24 | End: 2025-03-24 | Stop reason: SDUPTHER

## 2025-03-24 RX ORDER — ESTRADIOL 0.1 MG/G
CREAM VAGINAL
COMMUNITY
Start: 2025-02-13

## 2025-03-24 RX ORDER — OMEPRAZOLE 40 MG/1
40 CAPSULE, DELAYED RELEASE ORAL DAILY
Qty: 30 CAPSULE | Refills: 3 | Status: SHIPPED | OUTPATIENT
Start: 2025-03-24

## 2025-03-24 NOTE — PROGRESS NOTES
Name: Fidelia Morales      : 1965      MRN: [id number]  Encounter Provider: Sariah Romero PA-C  Encounter Date: 3/24/2025   Encounter department: [facility] [place]  :  Assessment & Plan  Epigastric pain  Recurrent episodes of severe pain unrelated to eating or movement  Associated with extreme bloating  Pain lasts for about 15 minutes before spontaneously resolving  Can happen during the day and has awakened her at night  She is s/p CCY  Will start PPI  Advised no eating before bed  If not improving will plan EGD  Will check US  Bloating  Trial of Omeprazole  Bloated on exam  Will check US    Colonoscopy 2024 with a 12mm adenoma removed    Irritable bowel syndrome with constipation  Doing well on Linzess 145mcg daily  Continue high fiber diet and increased water intake - 64oz per day      History of Present Illness   Abdominal Pain  The problem has been rapidly improving. Associated symptoms include constipation.     Fidelia Morales is a 59 y.o. female with constipation predominant irritable bowel syndrome, asthma, fatty liver, arthritis who presents for routine follow-up evaluation.  Patient is taking Linzess 145 mcg daily.  Typically this is helping her to have regular bowel movements.  She admits that last month she did struggle and was having some constipation but this is since resolved.  She reports that she has been getting episodes of severe epigastric pain associated with bloating.  This can happen anytime throughout the day and has awoken her from sleep.  There are no other acidic symptoms such as heartburn.  She has no vomiting.  She reports that the pain typically last for 15 minutes before spontaneously resolving.  The pain is not associated with a day of increased constipation that she can figure.  She is not taking anything for reflux.  She denies that the pain ever occurs with exertion.        Review of Systems   Gastrointestinal:  Positive  for abdominal pain and constipation.     Medical History Reviewed by provider this encounter:  Problems     .  Past Medical History   Past Medical History:   Diagnosis Date    Abdominal pain     Allergic 1/1/2000    Arthralgia of pelvic region and thigh     Arthritis 11/4/2020    Chest pain     CTS (carpal tunnel syndrome)     Fibromyalgia     Hepatitis     Leukocytopenia     Leukorrhea     Temporomandibular joint sounds on opening and/or closing the jaw      Past Surgical History:   Procedure Laterality Date    BACK SURGERY      CHOLECYSTECTOMY      FOOT SURGERY Right     INCONTINENCE SURGERY      vaginal sling for stress incontinence    IR BIOPSY BONE MARROW  11/29/2022    LUMBAR FUSION  03/06/2019    [facility]    SINUS SURGERY      SPINAL FUSION  3/5/2019    SPINE SURGERY  03/05/2019    TUBAL LIGATION  1995     Family History   Problem Relation Age of Onset    Alzheimer's disease Father     Asthma Father     Heart attack Father     COPD Father     Hypertension Father     Heart disease Father     Alzheimer's disease Maternal Grandmother     Lymphoma Maternal Aunt     Diabetes Mother     Emphysema Maternal Grandfather       reports that she has never smoked. She has never used smokeless tobacco. She reports that she does not drink alcohol and does not use drugs.  Current Outpatient Medications   Medication Instructions    acetaminophen (TYLENOL) 500 mg, As needed    Cholecalciferol 2,000 Units    cyclobenzaprine (FLEXERIL) 5 mg, Oral, 3 times daily PRN    estradiol (ESTRACE) 0.1 mg/g vaginal cream Insert 2g daily for two weeks, then 1g daily for two weeks, than 1g two - three times per week    gabapentin (NEURONTIN) 800 mg tablet Take 1 tablet (800 mg total) by mouth 2 (two) times a day AND 1.5 tablets (1,200 mg total) daily at bedtime.    hydroxychloroquine (PLAQUENIL) 200 mg, Oral, 2 times daily with meals    Linzess 145 mcg, Oral, Daily    meloxicam (MOBIC) 7.5 mg, Oral, Daily PRN    minoxidil (LONITEN) 2.5 mg tablet  TAKE ONE-HALF (1/2) TABLET DAILY    mometasone (NASONEX) 50 mcg/act nasal spray USE 2 SPRAYS IN EACH NOSTRIL DAILY    multivitamin (THERAGRAN) TABS 1 tablet, Daily    omeprazole (PRILOSEC) 40 mg, Oral, Daily    predniSONE 1 mg tablet Take 3 tablets daily for 1 week then decrease to 2 tablets daily for 1 week then decrease to 1 tablet daily for 1 week then discontinue prednisone.  Take with food.    Vitamin C ER 1,000 mg     Allergies   Allergen Reactions    Other Sneezing and Nasal Congestion     SEASONAL ALLERGIES      Current Outpatient Medications on File Prior to Visit   Medication Sig Dispense Refill    acetaminophen (TYLENOL) 325 mg tablet Take 500 mg by mouth as needed for mild pain      Ascorbic Acid (Vitamin C ER) 1000 MG TBCR Take 1,000 mg by mouth      Cholecalciferol 50 MCG (2000 UT) TABS Take 2,000 Units by mouth      cyclobenzaprine (FLEXERIL) 5 mg tablet Take 1 tablet (5 mg total) by mouth 3 (three) times a day as needed for muscle spasms 90 tablet 0    estradiol (ESTRACE) 0.1 mg/g vaginal cream Insert 2g daily for two weeks, then 1g daily for two weeks, than 1g two - three times per week      gabapentin (NEURONTIN) 800 mg tablet Take 1 tablet (800 mg total) by mouth 2 (two) times a day AND 1.5 tablets (1,200 mg total) daily at bedtime. 315 tablet 1    hydroxychloroquine (PLAQUENIL) 200 mg tablet Take 1 tablet (200 mg total) by mouth 2 (two) times a day with meals 180 tablet 1    meloxicam (MOBIC) 7.5 mg tablet Take 1 tablet (7.5 mg total) by mouth daily as needed for moderate pain 90 tablet 1    minoxidil (LONITEN) 2.5 mg tablet TAKE ONE-HALF (1/2) TABLET DAILY 45 tablet 3    mometasone (NASONEX) 50 mcg/act nasal spray USE 2 SPRAYS IN EACH NOSTRIL DAILY 51 g 3    multivitamin (THERAGRAN) TABS Take 1 tablet by mouth daily      predniSONE 1 mg tablet Take 3 tablets daily for 1 week then decrease to 2 tablets daily for 1 week then decrease to 1 tablet daily for 1 week then discontinue prednisone.  Take  "with food. 90 tablet 0    [DISCONTINUED] linaCLOtide (Linzess) 145 MCG CAPS Take 1 capsule (145 mcg total) by mouth daily 90 capsule 0    [DISCONTINUED] LORazepam (Ativan) 0.5 mg tablet Take 1 tablet 1 hour prior to procedure may take 2nd tablet at time of procedure ( 2 events MRI+TAMMI) (Patient not taking: Reported on 3/24/2025) 4 tablet 0     No current facility-administered medications on file prior to visit.      Social History     Tobacco Use    Smoking status: Never    Smokeless tobacco: Never   Vaping Use    Vaping status: Never Used   Substance and Sexual Activity    Alcohol use: No    Drug use: No    Sexual activity: Yes     Partners: Male     Birth control/protection: Female Sterilization, None        Objective   /80 (BP Location: Right arm, Patient Position: Sitting, Cuff Size: Standard)   Pulse 60   Temp (!) 97 °F (36.1 °C) (Temporal)   Ht 5' 1\" (1.549 m)   Wt 67.3 kg (148 lb 6.4 oz)   SpO2 98%   BMI 28.04 kg/m²      Physical Exam  Vitals reviewed.   Constitutional:       Appearance: Normal appearance.   HENT:      Head: Normocephalic and atraumatic.   Eyes:      Extraocular Movements: Extraocular movements intact.      Pupils: Pupils are equal, round, and reactive to light.   Cardiovascular:      Rate and Rhythm: Normal rate and regular rhythm.   Abdominal:      General: Bowel sounds are normal. There is distension.      Palpations: Abdomen is soft. There is no mass.      Tenderness: There is abdominal tenderness.   Skin:     General: Skin is warm and dry.      Coloration: Skin is not jaundiced.   Neurological:      General: No focal deficit present.      Mental Status: She is alert and oriented to person, place, and time.           "

## 2025-03-24 NOTE — ASSESSMENT & PLAN NOTE
Doing well on Linzess 145mcg daily  Continue high fiber diet and increased water intake - 64oz per day

## 2025-03-25 RX ORDER — LINACLOTIDE 145 UG/1
145 CAPSULE, GELATIN COATED ORAL DAILY
Qty: 90 CAPSULE | Refills: 1 | Status: SHIPPED | OUTPATIENT
Start: 2025-03-25

## 2025-03-31 ENCOUNTER — HOSPITAL ENCOUNTER (OUTPATIENT)
Dept: ULTRASOUND IMAGING | Facility: HOSPITAL | Age: 60
Discharge: HOME | End: 2025-03-31
Payer: COMMERCIAL

## 2025-03-31 DIAGNOSIS — R10.13 EPIGASTRIC PAIN: ICD-10-CM

## 2025-03-31 DIAGNOSIS — R14.0 BLOATING: ICD-10-CM

## 2025-03-31 PROCEDURE — 76700 US EXAM ABDOM COMPLETE: CPT

## 2025-04-02 ENCOUNTER — RESULTS FOLLOW-UP (OUTPATIENT)
Dept: GASTROENTEROLOGY | Facility: CLINIC | Age: 60
End: 2025-04-02

## 2025-04-02 NOTE — TELEPHONE ENCOUNTER
----- Message from Sariah Romero PA-C sent at 4/2/2025  2:11 PM EDT -----  Regarding: FW:  Hi Fidelia… Your ultrasound is normal. How are you feeling?  ----- Message -----  From: Interface, Radiology Results In  Sent: 4/2/2025   1:53 PM EDT  To: Sariah Romero PA-C

## 2025-04-03 ENCOUNTER — TELEPHONE (OUTPATIENT)
Dept: PAIN MEDICINE | Facility: CLINIC | Age: 60
End: 2025-04-03

## 2025-04-11 ENCOUNTER — OFFICE VISIT (OUTPATIENT)
Dept: HEMATOLOGY ONCOLOGY | Facility: CLINIC | Age: 60
End: 2025-04-11
Payer: COMMERCIAL

## 2025-04-11 VITALS
TEMPERATURE: 97.1 F | HEART RATE: 85 BPM | BODY MASS INDEX: 28.13 KG/M2 | DIASTOLIC BLOOD PRESSURE: 78 MMHG | WEIGHT: 149 LBS | HEIGHT: 61 IN | OXYGEN SATURATION: 96 % | RESPIRATION RATE: 16 BRPM | SYSTOLIC BLOOD PRESSURE: 138 MMHG

## 2025-04-11 DIAGNOSIS — R76.8 ELEVATED SERUM IMMUNOGLOBULIN FREE LIGHT CHAINS: ICD-10-CM

## 2025-04-11 DIAGNOSIS — D70.9 NEUTROPENIA, UNSPECIFIED TYPE (HCC): Primary | ICD-10-CM

## 2025-04-11 DIAGNOSIS — R59.0 CERVICAL LYMPHADENOPATHY: ICD-10-CM

## 2025-04-11 PROCEDURE — 99212 OFFICE O/P EST SF 10 MIN: CPT | Performed by: PHYSICIAN ASSISTANT

## 2025-04-11 NOTE — PROGRESS NOTES
Name: Fidelia Morales      : 1965      MRN: [id number]  Encounter Provider: Patrica Breen PA-C  Encounter Date: 2025   Encounter department: [facility] [place]  :  58-year-old female with osteoarthritis on Plaquenil who presents as follow-up for leukopenia with reduced neutrophils.  Most recent labs WBC 5.79. hemoglobin 13.0 g/dL, ,000, ANC 3.50.  Assessment & Plan  Neutropenia, unspecified type (HCC)  Bone marrow biopsy in  showed mild increase in plasma cell (4%) otherwise normal.  SPEP has been unremarkable.  She had a very mildly abnormal sFLC 1.69 previously.  These findings are likely reactive. Most recent CBC 2024: WBC 3.08 ANC 1.47, hgb 12.6, plt 278k. Patient denies consitutional symptoms.   Repeat SPEP 2024 no monoclonal bands, sFLCR 1.69- stable   Recent abdominal ultrasound shows normal spleen  Overall leukopenia and absolute neutrophil count are stable. This is likely drug induced (Plaquenil, omeprazole).  Her white blood count recently normalized however this was in the setting of steroids I expect that it will go back down into the 3000 range in a few weeks.    I offered the patient continued observation in our clinic versus monitoring of blood counts through her PCP, she is in favor of the latter. Recommend CBC-D at least annually. I explained to the patient she will need referred back to our office with worsening leukopenia, ANC persistently <1.0 and/or development of other cytopenias or constitutional symptoms, frequent infection etc. She voices understanding and is in agreement with plan Will send communication to PCP        Cervical lymphadenopathy  She was found to have enlarged lymph node in left neck previously which  was palpated on exam today. She believes is getting larger.  Last ultrasound neck was performed 2022 which revealed 1.8 x 0.3 x 1.0 cm morphologically normal-appearing lymph node. Repeat US showed  1.6cm normal LN stable from prior study.  Stable on exam today, no further work up recommended   Pt advised to notify our office if LN is increasing or she develops new areas of adenopathy          Elevated serum immunoglobulin free light chains  Lab values just outside of normal range with normal serum kappa free light chain and normal lambda free light chain.  Bone marrow biopsy previously was negative for Congo red stain  More likely statistical artifact versus due to inflammation  No additional follow-up needed at this time  Can be repeated in future if she develops cytopenias, renal disease, heart failure, constitutional symptoms etc.           No follow-ups on file.    History of Present Illness   Chief Complaint   Patient presents with    Follow-up     History of Present Illness  58-year-old female with fatty liver disease,IBS-C, osteoarthritis on Plaquenil who presents as follow-up for isolated neutropenia.     On chart review, she has had intermittent leukopenia since 2019 with WBC 3.51 and normal differential at this time.  ANC 1.89.  No history of frequent infections. Patient has known osteoarthritis and follows with rheumatology for this. Previously on meloxicam and not taking Plaquenil since October 2023.  Avoids dairy and gluten products. History of Fatty liver since 20s, following with gastroenterology.      Acute and Chronic hepatitis testing 2/2019 - nonreactive   HIV 1/2 AG-AB combo nonreactive 08/2020  B12 10/2022 807 MMA Normal. Folate >20. Copper normal.   MAY, RF, Lyme antibody, TSH, CCP all normal. 1/19/2021 EBV IgG and EBNA-IgG elevated, past infection .   SPEP 03/2023.  No monoclonal bands.  Immunoglobulins were normal. sFLC 1.69   G6PD 2023: elevated (431)     US neck 09/2022  Ultrasound of head and neck soft tissue for localized swelling mass and lump found to have 1.8 x 0.3 x 1.0 cm morphologically normal-appearing lymph node.     Bone marrow biopsy 11/2022   Addendum 2   [employer]  Advanced NGS Myeloid Report ([facility]#[id number], evaluated by Gunner Kimbrough, PhD, FAC):     RESULT SUMMARY: NORMAL     DETECTED GENOMIC ALTERATIONS: No Mutations Identified     PERTINENT NEGATIVE RESULTS:  The following genes are NEGATIVE for clinically relevant mutations. Mutational hotspots and surrounding exonic regions were interrogated for DNA level point mutations and indels (fusions not assayed).  ABL1, ANKRD26, ASXL1, ATRX, BCOR, BCORL1, BRAF, CALR, CBL, CCND2, CDKN2A, CEBPA, CSF3R, CUX1, DDX41, DNMT3A, ETNK1, ETV6, EZH2, FBXW7, FLT3, GATA2, HRAS, IDH1, IDH2, JAK2, KDM6A, KIT, KMT2A, KRAS, MAP2K1, MPL, MYD88, NF1, NPM1, NRAS, PDGFRA, PHF6, PTEN, PTPN11, RUNX1, SETBP1, SF3B1, SRSF2, STAG2, TET2, TP53, U2AF1, WT1, ZRSR2     Congo red stain  No apple green birefringence material is identified on Congo red stain (Block A1).   Addendum electronically signed by Maricarmen Brambila MD on 12/5/2022 at 11:44 AM   Addendum   Cytogenetic studies ([facility]#[id number], evaluated by Bolivar Weinstein, PhD, FAC):     Karyotype:  46,XX[20]     Cytogenetics Analysis:  Metaphases Counted Metaphases Analyzed Metaphases Karyotyped GTG Band level  Culture Type(s)   20 20 3 350-400 24hrU      Interpretation:    A normal female chromosome complement was observed in twenty metaphases analyzed.  Within the limits of the technology utilized in this study, no consistent numerical or structural abnormality was identified.   Addendum electronically signed by Maricarmen Brambila MD on 12/2/2022 at 12:45 PM   Final Diagnosis   A-C. Bone Marrow, Left Iliac Crest, Core, Clot and Aspirate:  - Normocellular flanagan (50% cellularity) with trilineage hematopoiesis and 4% plasma cells (Kappa to Lambda ratio 2:1), cannot exclude monoclonal gammopathy of undetermined significance (MGUS) versus reactive changes.   - No evidence of leukemia or lymphoma.  - Adequate iron stores.  - No reticulin fibrosis.  - Cytogenetic/Molecular study pending.         osteoarthritis     "  stable, no b sx. Hx bmbx 11/2022 normal however mgus not excluded, f/u myeloma labs unrevealing, no mspike on spep. Continue to monitor. F/u 6 months labs every 3. Having early satiety, worsened over past month, will order US abdomen to exclude hepatosplenomegaly. Will be starting plaquenil, made patient aware of risk of progressive leukopenia and need for close monitoring.      She has been on plaquenil 010/2023  Prior HIV/Hep C negative   EBV IgG  Pertinent Medical History     04/11/25: she had flare of osteoarthritis, was recently on steroids. Symptoms now improved. She has LN in neck she feels is enlarged but has now changed since last visit. No other new areas of lumps/bumps.      Review of Systems   Constitutional:  Negative for fever and unexpected weight change.        Damp at night since menopause. Has hot flashes, no drenching nights      Eyes:  Negative for visual disturbance.   Respiratory:  Negative for shortness of breath.    Cardiovascular:  Positive for chest pain (chest/esophagus pain, intermittent. started on PPI with some improvement. sometimes pain will radiate to stomach and cause cramping).   Gastrointestinal:  Positive for diarrhea (IBS-D). Negative for blood in stool.        No melena   Musculoskeletal:  Positive for arthralgias.   Skin:  Negative for rash.   Neurological:  Negative for headaches.   Hematological:  Negative for adenopathy.   All other systems reviewed and are negative.          Objective   /78 (BP Location: Right arm, Patient Position: Sitting, Cuff Size: Adult)   Pulse 85   Temp (!) 97.1 °F (36.2 °C) (Temporal)   Resp 16   Ht 5' 1\" (1.549 m)   Wt 67.6 kg (149 lb)   SpO2 96%   BMI 28.15 kg/m²     Physical Exam  Vitals reviewed.   HENT:      Head: Normocephalic.   Cardiovascular:      Rate and Rhythm: Normal rate and regular rhythm.   Pulmonary:      Effort: Pulmonary effort is normal.      Breath sounds: Normal breath sounds.   Abdominal:      Palpations: " Abdomen is soft.      Tenderness: There is no abdominal tenderness.   Musculoskeletal:      Cervical back: Neck supple.   Lymphadenopathy:      Cervical: Cervical adenopathy (LN 1.5cm left posterior cervical neck.) present.   Skin:     Findings: No rash.   Neurological:      Mental Status: She is alert.       Physical Exam      Results    Labs: I have reviewed the following labs:  Results for orders placed or performed in visit on 03/24/25   CBC and differential   Result Value Ref Range    WBC 5.79 4.31 - 10.16 Thousand/uL    RBC 4.28 3.81 - 5.12 Million/uL    Hemoglobin 12.9 11.5 - 15.4 g/dL    Hematocrit 38.9 34.8 - 46.1 %    MCV 91 82 - 98 fL    MCH 30.1 26.8 - 34.3 pg    MCHC 33.2 31.4 - 37.4 g/dL    RDW 12.3 11.6 - 15.1 %    MPV 10.1 8.9 - 12.7 fL    Platelets 285 149 - 390 Thousands/uL    nRBC 0 /100 WBCs    Segmented % 60 43 - 75 %    Immature Grans % 0 0 - 2 %    Lymphocytes % 32 14 - 44 %    Monocytes % 6 4 - 12 %    Eosinophils Relative 1 0 - 6 %    Basophils Relative 1 0 - 1 %    Absolute Neutrophils 3.50 1.85 - 7.62 Thousands/µL    Absolute Immature Grans 0.02 0.00 - 0.20 Thousand/uL    Absolute Lymphocytes 1.84 0.60 - 4.47 Thousands/µL    Absolute Monocytes 0.33 0.17 - 1.22 Thousand/µL    Eosinophils Absolute 0.06 0.00 - 0.61 Thousand/µL    Basophils Absolute 0.04 0.00 - 0.10 Thousands/µL

## 2025-04-11 NOTE — ASSESSMENT & PLAN NOTE
Bone marrow biopsy in 2022 showed mild increase in plasma cell (4%) otherwise normal.  SPEP has been unremarkable.  She had a very mildly abnormal sFLC 1.69 previously.  These findings are likely reactive. Most recent CBC 03/2024: WBC 3.08 ANC 1.47, hgb 12.6, plt 278k. Patient denies consitutional symptoms.   Repeat SPEP 09/2024 no monoclonal bands, sFLCR 1.69- stable   Recent abdominal ultrasound shows normal spleen  Overall leukopenia and absolute neutrophil count are stable. This is likely drug induced (Plaquenil, omeprazole).  Her white blood count recently normalized however this was in the setting of steroids I expect that it will go back down into the 3000 range in a few weeks.    I offered the patient continued observation in our clinic versus monitoring of blood counts through her PCP, she is in favor of the latter. Recommend CBC-D at least annually. I explained to the patient she will need referred back to our office with worsening leukopenia, ANC persistently <1.0 and/or development of other cytopenias or constitutional symptoms, frequent infection etc. She voices understanding and is in agreement with plan Will send communication to PCP

## 2025-04-18 DIAGNOSIS — R14.0 BLOATING: ICD-10-CM

## 2025-04-18 DIAGNOSIS — R10.13 EPIGASTRIC PAIN: ICD-10-CM

## 2025-04-18 RX ORDER — OMEPRAZOLE 40 MG/1
40 CAPSULE, DELAYED RELEASE ORAL DAILY
Qty: 90 CAPSULE | Refills: 1 | Status: SHIPPED | OUTPATIENT
Start: 2025-04-18

## 2025-04-18 RX ORDER — OMEPRAZOLE 40 MG/1
40 CAPSULE, DELAYED RELEASE ORAL DAILY
Qty: 90 CAPSULE | Refills: 1 | Status: SHIPPED | OUTPATIENT
Start: 2025-04-18 | End: 2025-04-18 | Stop reason: SDUPTHER

## 2025-04-19 DIAGNOSIS — L65.9 HAIR LOSS: ICD-10-CM

## 2025-04-21 DIAGNOSIS — L65.9 HAIR LOSS: ICD-10-CM

## 2025-04-21 RX ORDER — MINOXIDIL 2.5 MG/1
TABLET ORAL
Qty: 45 TABLET | Refills: 1 | Status: SHIPPED | OUTPATIENT
Start: 2025-04-21 | End: 2025-04-21

## 2025-04-22 RX ORDER — MINOXIDIL 2.5 MG/1
TABLET ORAL
Qty: 45 TABLET | Refills: 1 | Status: SHIPPED | OUTPATIENT
Start: 2025-04-22

## 2025-05-29 DIAGNOSIS — G89.4 CHRONIC PAIN SYNDROME: ICD-10-CM

## 2025-05-29 DIAGNOSIS — M96.1 LUMBAR POST-LAMINECTOMY SYNDROME: ICD-10-CM

## 2025-05-29 DIAGNOSIS — M54.16 LUMBAR RADICULOPATHY: ICD-10-CM

## 2025-06-02 RX ORDER — MELOXICAM 7.5 MG/1
7.5 TABLET ORAL DAILY PRN
Qty: 90 TABLET | Refills: 1 | Status: SHIPPED | OUTPATIENT
Start: 2025-06-02 | End: 2025-06-03 | Stop reason: SDUPTHER

## 2025-06-02 RX ORDER — GABAPENTIN 800 MG/1
TABLET ORAL
Qty: 315 TABLET | Refills: 1 | Status: SHIPPED | OUTPATIENT
Start: 2025-06-02 | End: 2025-06-03 | Stop reason: SDUPTHER

## 2025-06-03 RX ORDER — MELOXICAM 7.5 MG/1
7.5 TABLET ORAL DAILY PRN
Qty: 90 TABLET | Refills: 1 | Status: SHIPPED | OUTPATIENT
Start: 2025-06-03

## 2025-06-03 RX ORDER — GABAPENTIN 800 MG/1
TABLET ORAL
Qty: 315 TABLET | Refills: 1 | Status: SHIPPED | OUTPATIENT
Start: 2025-06-03

## 2025-07-21 ENCOUNTER — TELEPHONE (OUTPATIENT)
Age: 60
End: 2025-07-21